# Patient Record
Sex: FEMALE
[De-identification: names, ages, dates, MRNs, and addresses within clinical notes are randomized per-mention and may not be internally consistent; named-entity substitution may affect disease eponyms.]

---

## 2023-02-22 PROBLEM — Z00.00 ENCOUNTER FOR PREVENTIVE HEALTH EXAMINATION: Status: ACTIVE | Noted: 2023-02-22

## 2023-03-10 ENCOUNTER — APPOINTMENT (OUTPATIENT)
Dept: CT IMAGING | Facility: CLINIC | Age: 73
End: 2023-03-10
Payer: MEDICARE

## 2023-03-10 ENCOUNTER — OUTPATIENT (OUTPATIENT)
Dept: OUTPATIENT SERVICES | Facility: HOSPITAL | Age: 73
LOS: 1 days | End: 2023-03-10
Payer: MEDICARE

## 2023-03-10 DIAGNOSIS — Z00.8 ENCOUNTER FOR OTHER GENERAL EXAMINATION: ICD-10-CM

## 2023-03-10 PROCEDURE — 72192 CT PELVIS W/O DYE: CPT | Mod: MH

## 2023-03-10 PROCEDURE — 72192 CT PELVIS W/O DYE: CPT | Mod: 26,MH

## 2025-02-23 ENCOUNTER — INPATIENT (INPATIENT)
Facility: HOSPITAL | Age: 75
LOS: 9 days | Discharge: NURSING, REHAB OR LONG-TERM CARE | DRG: 93 | End: 2025-03-05
Attending: HOSPITALIST | Admitting: STUDENT IN AN ORGANIZED HEALTH CARE EDUCATION/TRAINING PROGRAM
Payer: MEDICARE

## 2025-02-23 VITALS
SYSTOLIC BLOOD PRESSURE: 146 MMHG | HEIGHT: 66 IN | DIASTOLIC BLOOD PRESSURE: 63 MMHG | OXYGEN SATURATION: 93 % | RESPIRATION RATE: 18 BRPM | HEART RATE: 86 BPM | WEIGHT: 149.91 LBS

## 2025-02-23 LAB
ALBUMIN SERPL ELPH-MCNC: 3.6 G/DL — SIGNIFICANT CHANGE UP (ref 3.3–5)
ALP SERPL-CCNC: 92 U/L — SIGNIFICANT CHANGE UP (ref 40–120)
ALT FLD-CCNC: 13 U/L — SIGNIFICANT CHANGE UP (ref 10–45)
ANION GAP SERPL CALC-SCNC: 17 MMOL/L — SIGNIFICANT CHANGE UP (ref 5–17)
APPEARANCE UR: ABNORMAL
APTT BLD: 30.4 SEC — SIGNIFICANT CHANGE UP (ref 24.5–35.6)
AST SERPL-CCNC: 27 U/L — SIGNIFICANT CHANGE UP (ref 10–40)
BACTERIA # UR AUTO: ABNORMAL /HPF
BASOPHILS # BLD AUTO: 0.04 K/UL — SIGNIFICANT CHANGE UP (ref 0–0.2)
BASOPHILS NFR BLD AUTO: 0.4 % — SIGNIFICANT CHANGE UP (ref 0–2)
BILIRUB SERPL-MCNC: 0.6 MG/DL — SIGNIFICANT CHANGE UP (ref 0.2–1.2)
BILIRUB UR-MCNC: NEGATIVE — SIGNIFICANT CHANGE UP
BUN SERPL-MCNC: 41 MG/DL — HIGH (ref 7–23)
CALCIUM SERPL-MCNC: 9.1 MG/DL — SIGNIFICANT CHANGE UP (ref 8.4–10.5)
CAST: >63 /LPF — HIGH (ref 0–4)
CHLORIDE SERPL-SCNC: 108 MMOL/L — SIGNIFICANT CHANGE UP (ref 96–108)
CO2 SERPL-SCNC: 19 MMOL/L — LOW (ref 22–31)
COARSE GRAN CASTS #/AREA URNS AUTO: PRESENT
COLOR SPEC: SIGNIFICANT CHANGE UP
CREAT SERPL-MCNC: 2.03 MG/DL — HIGH (ref 0.5–1.3)
DIFF PNL FLD: ABNORMAL
EGFR: 25 ML/MIN/1.73M2 — LOW
EGFR: 25 ML/MIN/1.73M2 — LOW
EOSINOPHIL # BLD AUTO: 0.25 K/UL — SIGNIFICANT CHANGE UP (ref 0–0.5)
EOSINOPHIL NFR BLD AUTO: 2.5 % — SIGNIFICANT CHANGE UP (ref 0–6)
FLUAV AG NPH QL: DETECTED
FLUBV AG NPH QL: SIGNIFICANT CHANGE UP
GAS PNL BLDV: SIGNIFICANT CHANGE UP
GLUCOSE SERPL-MCNC: 69 MG/DL — LOW (ref 70–99)
GLUCOSE UR QL: NEGATIVE MG/DL — SIGNIFICANT CHANGE UP
HCT VFR BLD CALC: 36.2 % — SIGNIFICANT CHANGE UP (ref 34.5–45)
HGB BLD-MCNC: 11.6 G/DL — SIGNIFICANT CHANGE UP (ref 11.5–15.5)
HYALINE CASTS # UR AUTO: PRESENT
IMM GRANULOCYTES NFR BLD AUTO: 0.5 % — SIGNIFICANT CHANGE UP (ref 0–0.9)
INR BLD: 1.16 RATIO — SIGNIFICANT CHANGE UP (ref 0.85–1.16)
KETONES UR-MCNC: ABNORMAL MG/DL
LEUKOCYTE ESTERASE UR-ACNC: ABNORMAL
LYMPHOCYTES # BLD AUTO: 1.02 K/UL — SIGNIFICANT CHANGE UP (ref 1–3.3)
LYMPHOCYTES # BLD AUTO: 10.3 % — LOW (ref 13–44)
MCHC RBC-ENTMCNC: 30.2 PG — SIGNIFICANT CHANGE UP (ref 27–34)
MCHC RBC-ENTMCNC: 32 G/DL — SIGNIFICANT CHANGE UP (ref 32–36)
MCV RBC AUTO: 94.3 FL — SIGNIFICANT CHANGE UP (ref 80–100)
MONOCYTES # BLD AUTO: 1 K/UL — HIGH (ref 0–0.9)
MONOCYTES NFR BLD AUTO: 10.1 % — SIGNIFICANT CHANGE UP (ref 2–14)
NEUTROPHILS # BLD AUTO: 7.53 K/UL — HIGH (ref 1.8–7.4)
NEUTROPHILS NFR BLD AUTO: 76.2 % — SIGNIFICANT CHANGE UP (ref 43–77)
NITRITE UR-MCNC: NEGATIVE — SIGNIFICANT CHANGE UP
NRBC BLD AUTO-RTO: 0 /100 WBCS — SIGNIFICANT CHANGE UP (ref 0–0)
PH UR: 6.5 — SIGNIFICANT CHANGE UP (ref 5–8)
PLATELET # BLD AUTO: 299 K/UL — SIGNIFICANT CHANGE UP (ref 150–400)
POTASSIUM SERPL-MCNC: 4.2 MMOL/L — SIGNIFICANT CHANGE UP (ref 3.5–5.3)
POTASSIUM SERPL-SCNC: 4.2 MMOL/L — SIGNIFICANT CHANGE UP (ref 3.5–5.3)
PROT SERPL-MCNC: 8.3 G/DL — SIGNIFICANT CHANGE UP (ref 6–8.3)
PROT UR-MCNC: 300 MG/DL
PROTHROM AB SERPL-ACNC: 13.3 SEC — SIGNIFICANT CHANGE UP (ref 9.9–13.4)
RBC # BLD: 3.84 M/UL — SIGNIFICANT CHANGE UP (ref 3.8–5.2)
RBC # FLD: 13.3 % — SIGNIFICANT CHANGE UP (ref 10.3–14.5)
RBC CASTS # UR COMP ASSIST: 32 /HPF — HIGH (ref 0–4)
REVIEW: SIGNIFICANT CHANGE UP
RSV RNA NPH QL NAA+NON-PROBE: SIGNIFICANT CHANGE UP
SARS-COV-2 RNA SPEC QL NAA+PROBE: SIGNIFICANT CHANGE UP
SODIUM SERPL-SCNC: 144 MMOL/L — SIGNIFICANT CHANGE UP (ref 135–145)
SP GR SPEC: 1.02 — SIGNIFICANT CHANGE UP (ref 1–1.03)
SQUAMOUS # UR AUTO: 26 /HPF — HIGH (ref 0–5)
TROPONIN T, HIGH SENSITIVITY RESULT: 47 NG/L — SIGNIFICANT CHANGE UP (ref 0–51)
UROBILINOGEN FLD QL: 0.2 MG/DL — SIGNIFICANT CHANGE UP (ref 0.2–1)
WBC # BLD: 9.89 K/UL — SIGNIFICANT CHANGE UP (ref 3.8–10.5)
WBC # FLD AUTO: 9.89 K/UL — SIGNIFICANT CHANGE UP (ref 3.8–10.5)
WBC CLUMPS # UR AUTO: PRESENT
WBC UR QL: >998 /HPF — HIGH (ref 0–5)

## 2025-02-23 PROCEDURE — 99285 EMERGENCY DEPT VISIT HI MDM: CPT | Mod: GC

## 2025-02-23 PROCEDURE — 70450 CT HEAD/BRAIN W/O DYE: CPT | Mod: 26

## 2025-02-23 PROCEDURE — 71250 CT THORAX DX C-: CPT | Mod: 26

## 2025-02-23 PROCEDURE — 74176 CT ABD & PELVIS W/O CONTRAST: CPT | Mod: 26

## 2025-02-23 PROCEDURE — 71045 X-RAY EXAM CHEST 1 VIEW: CPT | Mod: 26

## 2025-02-23 RX ORDER — PIPERACILLIN-TAZO-DEXTROSE,ISO 3.375G/5
3.38 IV SOLUTION, PIGGYBACK PREMIX FROZEN(ML) INTRAVENOUS ONCE
Refills: 0 | Status: COMPLETED | OUTPATIENT
Start: 2025-02-23 | End: 2025-02-23

## 2025-02-23 RX ADMIN — Medication 500 MILLILITER(S): at 17:03

## 2025-02-23 RX ADMIN — Medication 200 GRAM(S): at 18:05

## 2025-02-23 NOTE — ED PROVIDER NOTE - PHYSICAL EXAMINATION
GENERAL: well appearing  HEAD: normocephalic, atraumatic  HEENT: normal conjunctiva, oral mucosa moist  CARDIAC: regular rate and rhythm  PULM: coarse lung sounds b/l  GI: abdomen nondistended, soft, nontender  : no suprapubic tenderness  NEURO: moving all 4 extremities, AOx1  MSK: no peripheral edema  SKIN: extremities warm

## 2025-02-23 NOTE — ED ADULT NURSE NOTE - OBJECTIVE STATEMENT
75 y/o female with PMH of dementia arrives to the ER by ambulance from [facility]  complaining of coffee ground emesis.  As per EMS report pt had an episode of coffee ground today, sent for further evaluation.  On assessment pt is lethargic, A&Ox0,airway is patent, breathing spontaneously and unlabored. Skin is dry, warm. Abdomen is soft, no distended, no tender. rash all over the trunk noted Full ROM in all extremities.  Patient undressed and placed into gown, Pt placed on continuous cardiac monitor SPO2 91%RA placed on 2 L NC improving to 98 , rectal temp 98.5. Comfort and safety provided, side rails up with bed locked and in lowest position for safety. call bell within reach. [place] provided. will continue to reassess. 75 y/o female with PMH of dementia arrives to the ER by ambulance from [facility]  complaining of coffee ground emesis.  As per EMS report pt had an episode of coffee ground today and was agitated,  sent for further evaluation.  On assessment pt is lethargic, A&Ox0,airway is patent, breathing spontaneously and unlabored. Skin is dry, warm. Abdomen is soft, no distended, no tender. rash all over the trunk noted Full ROM in all extremities.  Patient undressed and placed into gown, Pt placed on continuous cardiac monitor SPO2 91%RA placed on 2 L NC improving to 98 , rectal temp 98.5. Comfort and safety provided, side rails up with bed locked and in lowest position for safety. call bell within reach. [place] provided. will continue to reassess.

## 2025-02-23 NOTE — ED PROVIDER NOTE - ATTENDING CONTRIBUTION TO CARE
Hx: limited from patient due to dementia with cognitive impairement.  From EMS, NH records and pt's daughter Miguelina Wheeler.  Pt with +influenza last week, also was getting IV antibiotics, now with reported coffee ground emesis once, here for eval.  Had blood work today showing Cr=2.11 and hgb 11 (unknown baseline).  Pt with colectomy from UC 40+yrs ago.     PE: ill appearing, mild respiratory distress, awake but does not answer any of my questions, responds to pain, mild tachypnea, coarse bs b/l, ab soft, nt, fungal rash abdominal and pelvic wall, ostomy in place without obvious cellulitis, no JVD.      MDM: reported coffee-ground emesis, consider GI bleed, cough and muld hypoxia, consider viral vs bacterial pneumonia consider pulm edema, check cbc r/o anemia or leukocytosis, check bmp to r/o metabolic derangement and lyte imbalance, trop, probnp, cultures blood, viral swab, ct chest/ab/pelvis eval for pneumonia, intra-abdominal process given poor historian Hx: limited from patient due to dementia with cognitive impairement.  From EMS, NH records and pt's daughter Miguelina Wheeler.  Pt with +influenza last week, also was getting IV antibiotics, now with reported coffee ground emesis once, here for eval.  Had blood work today showing Cr=2.11 and hgb 11 (unknown baseline).  Pt with colectomy from UC 40+yrs ago.     PE: ill appearing, mild respiratory distress, awake but does not answer any of my questions, responds to pain, mild tachypnea, coarse bs b/l, ab soft, nt, fungal rash abdominal and pelvic wall, ostomy in place without obvious cellulitis, no JVD.      MDM: reported coffee-ground emesis, consider GI bleed, cough and muld hypoxia, consider viral vs bacterial pneumonia consider pulm edema, check cbc r/o anemia or leukocytosis, check bmp to r/o metabolic derangement and lyte imbalance, trop, probnp, cultures blood, viral swab, ct chest/ab/pelvis eval for pneumonia, intra-abdominal process given poor historian    Progress Note 1850: pt with significant UTI likely causing encephalopathy, awaiting ct head/chest/abdomen.  Updated pt's daughter.      ***Of note, pt's daughter is HCP, showed me electronic document, and states her brother (pt's son) has a difference of opinion on mother's care and may request different things (like moving facilities, etc...).

## 2025-02-23 NOTE — ED PROVIDER NOTE - INTERNATIONAL TRAVEL
No Quality 111:Pneumonia Vaccination Status For Older Adults: Pneumococcal Vaccination Previously Received Detail Level: Detailed Quality 110: Preventive Care And Screening: Influenza Immunization: Influenza Immunization Administered during Influenza season Name And Contact Information For Health Care Proxy: [id number] [place] Quality 402: Tobacco Use And Help With Quitting Among Adolescents: Patient screened for tobacco and never smoked

## 2025-02-23 NOTE — ED PROVIDER NOTE - PROGRESS NOTE DETAILS
[place] PGY3 - Lab work notable for influenza A positive, lactate 1.7 UA with evidence of acute urinary tract infection.  Pending CT scans.  Likely admission for antibiotics and further management.  Zosyn ordered for acute UTI.

## 2025-02-23 NOTE — ED PROVIDER NOTE - OBJECTIVE STATEMENT
74-year-old female with a history of dementia, T2DM, CHF, HLD, A-fib, CAD, HTN, GERD, CKD presenting with altered mental status, hypoxia from nursing facility.  Per daughter at bedside he was diagnosed with flu approximately 6 days ago.  Earlier today she was at her nursing home and was noted to be hypoxic to 93% and sent to the ED for further evaluation.  Daughter also notes that she was significantly more altered than her baseline.  Unable to complete ROS 2/2 mental status.

## 2025-02-23 NOTE — ED PROVIDER NOTE - CARE PLAN
1 Principal Discharge DX:	Acute UTI   Principal Discharge DX:	Toxic encephalopathy  Secondary Diagnosis:	Acute cystitis  Secondary Diagnosis:	Acute respiratory failure with hypoxia

## 2025-02-24 DIAGNOSIS — K92.0 HEMATEMESIS: ICD-10-CM

## 2025-02-24 DIAGNOSIS — G92.9 UNSPECIFIED TOXIC ENCEPHALOPATHY: ICD-10-CM

## 2025-02-24 DIAGNOSIS — I48.0 PAROXYSMAL ATRIAL FIBRILLATION: ICD-10-CM

## 2025-02-24 DIAGNOSIS — J10.1 INFLUENZA DUE TO OTHER IDENTIFIED INFLUENZA VIRUS WITH OTHER RESPIRATORY MANIFESTATIONS: ICD-10-CM

## 2025-02-24 DIAGNOSIS — J18.9 PNEUMONIA, UNSPECIFIED ORGANISM: ICD-10-CM

## 2025-02-24 DIAGNOSIS — E11.9 TYPE 2 DIABETES MELLITUS WITHOUT COMPLICATIONS: ICD-10-CM

## 2025-02-24 DIAGNOSIS — E16.2 HYPOGLYCEMIA, UNSPECIFIED: ICD-10-CM

## 2025-02-24 DIAGNOSIS — I10 ESSENTIAL (PRIMARY) HYPERTENSION: ICD-10-CM

## 2025-02-24 DIAGNOSIS — I25.10 ATHEROSCLEROTIC HEART DISEASE OF NATIVE CORONARY ARTERY WITHOUT ANGINA PECTORIS: ICD-10-CM

## 2025-02-24 DIAGNOSIS — N39.0 URINARY TRACT INFECTION, SITE NOT SPECIFIED: ICD-10-CM

## 2025-02-24 LAB
CULTURE RESULTS: NO GROWTH — SIGNIFICANT CHANGE UP
FLUAV H1 2009 PAND RNA SPEC QL NAA+PROBE: DETECTED
GLUCOSE BLDC GLUCOMTR-MCNC: 157 MG/DL — HIGH (ref 70–99)
GLUCOSE BLDC GLUCOMTR-MCNC: 162 MG/DL — HIGH (ref 70–99)
GLUCOSE BLDC GLUCOMTR-MCNC: 172 MG/DL — HIGH (ref 70–99)
GLUCOSE BLDC GLUCOMTR-MCNC: 177 MG/DL — HIGH (ref 70–99)
GLUCOSE BLDC GLUCOMTR-MCNC: 177 MG/DL — HIGH (ref 70–99)
HCT VFR BLD CALC: 36.2 % — SIGNIFICANT CHANGE UP (ref 34.5–45)
HGB BLD-MCNC: 11.7 G/DL — SIGNIFICANT CHANGE UP (ref 11.5–15.5)
HPIV4 RNA SPEC QL NAA+PROBE: DETECTED
MCHC RBC-ENTMCNC: 30.6 PG — SIGNIFICANT CHANGE UP (ref 27–34)
MCHC RBC-ENTMCNC: 32.3 G/DL — SIGNIFICANT CHANGE UP (ref 32–36)
MCV RBC AUTO: 94.8 FL — SIGNIFICANT CHANGE UP (ref 80–100)
NRBC BLD AUTO-RTO: 0 /100 WBCS — SIGNIFICANT CHANGE UP (ref 0–0)
PLATELET # BLD AUTO: 316 K/UL — SIGNIFICANT CHANGE UP (ref 150–400)
RAPID RVP RESULT: DETECTED
RBC # BLD: 3.82 M/UL — SIGNIFICANT CHANGE UP (ref 3.8–5.2)
RBC # FLD: 13.3 % — SIGNIFICANT CHANGE UP (ref 10.3–14.5)
SARS-COV-2 RNA SPEC QL NAA+PROBE: SIGNIFICANT CHANGE UP
SPECIMEN SOURCE: SIGNIFICANT CHANGE UP
WBC # BLD: 11.66 K/UL — HIGH (ref 3.8–10.5)
WBC # FLD AUTO: 11.66 K/UL — HIGH (ref 3.8–10.5)

## 2025-02-24 PROCEDURE — 99223 1ST HOSP IP/OBS HIGH 75: CPT

## 2025-02-24 RX ORDER — INSULIN LISPRO 100 U/ML
INJECTION, SOLUTION INTRAVENOUS; SUBCUTANEOUS AT BEDTIME
Refills: 0 | Status: DISCONTINUED | OUTPATIENT
Start: 2025-02-24 | End: 2025-02-25

## 2025-02-24 RX ORDER — SODIUM CHLORIDE 9 G/1000ML
1000 INJECTION, SOLUTION INTRAVENOUS
Refills: 0 | Status: DISCONTINUED | OUTPATIENT
Start: 2025-02-24 | End: 2025-02-25

## 2025-02-24 RX ORDER — GLUCAGON 3 MG/1
1 POWDER NASAL ONCE
Refills: 0 | Status: DISCONTINUED | OUTPATIENT
Start: 2025-02-24 | End: 2025-03-05

## 2025-02-24 RX ORDER — METOPROLOL SUCCINATE 50 MG/1
10 TABLET, EXTENDED RELEASE ORAL EVERY 6 HOURS
Refills: 0 | Status: DISCONTINUED | OUTPATIENT
Start: 2025-02-24 | End: 2025-02-24

## 2025-02-24 RX ORDER — SENNA 187 MG
1 TABLET ORAL
Refills: 0 | DISCHARGE

## 2025-02-24 RX ORDER — METOPROLOL SUCCINATE 50 MG/1
10 TABLET, EXTENDED RELEASE ORAL EVERY 6 HOURS
Refills: 0 | Status: DISCONTINUED | OUTPATIENT
Start: 2025-02-24 | End: 2025-02-26

## 2025-02-24 RX ORDER — OSELTAMIVIR PHOSPHATE 75 MG/1
30 CAPSULE ORAL DAILY
Refills: 0 | Status: DISCONTINUED | OUTPATIENT
Start: 2025-02-24 | End: 2025-02-25

## 2025-02-24 RX ORDER — ATORVASTATIN CALCIUM 80 MG/1
10 TABLET, FILM COATED ORAL AT BEDTIME
Refills: 0 | Status: DISCONTINUED | OUTPATIENT
Start: 2025-02-24 | End: 2025-02-27

## 2025-02-24 RX ORDER — INSULIN LISPRO 100 U/ML
INJECTION, SOLUTION INTRAVENOUS; SUBCUTANEOUS
Refills: 0 | Status: DISCONTINUED | OUTPATIENT
Start: 2025-02-24 | End: 2025-02-25

## 2025-02-24 RX ORDER — CLOPIDOGREL BISULFATE 75 MG/1
1 TABLET, FILM COATED ORAL
Refills: 0 | DISCHARGE

## 2025-02-24 RX ORDER — LOSARTAN POTASSIUM 100 MG/1
25 TABLET, FILM COATED ORAL DAILY
Refills: 0 | Status: DISCONTINUED | OUTPATIENT
Start: 2025-02-24 | End: 2025-02-28

## 2025-02-24 RX ORDER — ATORVASTATIN CALCIUM 80 MG/1
1 TABLET, FILM COATED ORAL
Refills: 0 | DISCHARGE

## 2025-02-24 RX ORDER — LIPASE/PROTEASE/AMYLASE 10K-37.5K
1 CAPSULE,DELAYED RELEASE (ENTERIC COATED) ORAL
Refills: 0 | Status: DISCONTINUED | OUTPATIENT
Start: 2025-02-24 | End: 2025-03-05

## 2025-02-24 RX ORDER — AZITHROMYCIN 250 MG
500 CAPSULE ORAL EVERY 24 HOURS
Refills: 0 | Status: DISCONTINUED | OUTPATIENT
Start: 2025-02-24 | End: 2025-02-24

## 2025-02-24 RX ORDER — HALOPERIDOL 10 MG/1
1 TABLET ORAL ONCE
Refills: 0 | Status: COMPLETED | OUTPATIENT
Start: 2025-02-24 | End: 2025-02-24

## 2025-02-24 RX ORDER — LABETALOL HYDROCHLORIDE 200 MG/1
5 TABLET, FILM COATED ORAL ONCE
Refills: 0 | Status: COMPLETED | OUTPATIENT
Start: 2025-02-24 | End: 2025-02-24

## 2025-02-24 RX ORDER — DEXTROSE 50 % IN WATER 50 %
25 SYRINGE (ML) INTRAVENOUS ONCE
Refills: 0 | Status: DISCONTINUED | OUTPATIENT
Start: 2025-02-24 | End: 2025-02-24

## 2025-02-24 RX ORDER — PIPERACILLIN-TAZO-DEXTROSE,ISO 3.375G/5
3.38 IV SOLUTION, PIGGYBACK PREMIX FROZEN(ML) INTRAVENOUS EVERY 12 HOURS
Refills: 0 | Status: DISCONTINUED | OUTPATIENT
Start: 2025-02-24 | End: 2025-02-24

## 2025-02-24 RX ORDER — DEXTROSE 50 % IN WATER 50 %
25 SYRINGE (ML) INTRAVENOUS ONCE
Refills: 0 | Status: DISCONTINUED | OUTPATIENT
Start: 2025-02-24 | End: 2025-03-05

## 2025-02-24 RX ORDER — DEXTROSE 50 % IN WATER 50 %
15 SYRINGE (ML) INTRAVENOUS ONCE
Refills: 0 | Status: DISCONTINUED | OUTPATIENT
Start: 2025-02-24 | End: 2025-03-05

## 2025-02-24 RX ORDER — PIPERACILLIN-TAZO-DEXTROSE,ISO 3.375G/5
3.38 IV SOLUTION, PIGGYBACK PREMIX FROZEN(ML) INTRAVENOUS EVERY 8 HOURS
Refills: 0 | Status: DISCONTINUED | OUTPATIENT
Start: 2025-02-24 | End: 2025-02-25

## 2025-02-24 RX ORDER — SODIUM CHLORIDE 9 G/1000ML
1000 INJECTION, SOLUTION INTRAVENOUS
Refills: 0 | Status: DISCONTINUED | OUTPATIENT
Start: 2025-02-24 | End: 2025-03-05

## 2025-02-24 RX ORDER — GABAPENTIN 400 MG/1
100 CAPSULE ORAL THREE TIMES A DAY
Refills: 0 | Status: DISCONTINUED | OUTPATIENT
Start: 2025-02-24 | End: 2025-03-05

## 2025-02-24 RX ORDER — LIPASE/PROTEASE/AMYLASE 10K-37.5K
1 CAPSULE,DELAYED RELEASE (ENTERIC COATED) ORAL
Refills: 0 | DISCHARGE

## 2025-02-24 RX ORDER — METOPROLOL SUCCINATE 50 MG/1
1 TABLET, EXTENDED RELEASE ORAL
Refills: 0 | DISCHARGE

## 2025-02-24 RX ORDER — DEXTROSE 50 % IN WATER 50 %
12.5 SYRINGE (ML) INTRAVENOUS ONCE
Refills: 0 | Status: DISCONTINUED | OUTPATIENT
Start: 2025-02-24 | End: 2025-03-05

## 2025-02-24 RX ORDER — METOPROLOL SUCCINATE 50 MG/1
100 TABLET, EXTENDED RELEASE ORAL DAILY
Refills: 0 | Status: DISCONTINUED | OUTPATIENT
Start: 2025-02-24 | End: 2025-02-24

## 2025-02-24 RX ORDER — GABAPENTIN 400 MG/1
1 CAPSULE ORAL
Refills: 0 | DISCHARGE

## 2025-02-24 RX ADMIN — Medication 25 GRAM(S): at 13:33

## 2025-02-24 RX ADMIN — INSULIN LISPRO 1: 100 INJECTION, SOLUTION INTRAVENOUS; SUBCUTANEOUS at 12:24

## 2025-02-24 RX ADMIN — Medication 25 GRAM(S): at 21:55

## 2025-02-24 RX ADMIN — INSULIN LISPRO 1: 100 INJECTION, SOLUTION INTRAVENOUS; SUBCUTANEOUS at 08:49

## 2025-02-24 RX ADMIN — Medication 25 GRAM(S): at 06:15

## 2025-02-24 RX ADMIN — LABETALOL HYDROCHLORIDE 5 MILLIGRAM(S): 200 TABLET, FILM COATED ORAL at 21:09

## 2025-02-24 RX ADMIN — SODIUM CHLORIDE 50 MILLILITER(S): 9 INJECTION, SOLUTION INTRAVENOUS at 18:17

## 2025-02-24 RX ADMIN — Medication 40 MILLIGRAM(S): at 04:21

## 2025-02-24 RX ADMIN — Medication 40 MILLIGRAM(S): at 18:16

## 2025-02-24 RX ADMIN — INSULIN LISPRO 1: 100 INJECTION, SOLUTION INTRAVENOUS; SUBCUTANEOUS at 18:53

## 2025-02-24 RX ADMIN — Medication 250 MILLIGRAM(S): at 10:30

## 2025-02-24 RX ADMIN — HALOPERIDOL 1 MILLIGRAM(S): 10 TABLET ORAL at 01:06

## 2025-02-24 NOTE — SWALLOW BEDSIDE ASSESSMENT ADULT - SWALLOW EVAL: DIAGNOSIS
74yoF w/ hx of dementia, GERD, p/w acute metabolic encephalopathy iso influenza, CAP, poss UTI, and poss coffee ground emesis; CT chest w/ LLL opacity and scattered groundglass and tree-in-bud opacities. Pt p/w orally defensive behaviors negatively impacting participation in the feeding task. Pt awake and awake, nonverbal, unable to follow commands; however, appearing internally distracted and restless, unable to participate meaningfully in SLP evaluation. Upon attempts to present PO to oral cavity, pt noted to repeatedly turn head away from clinician despite encouragement. Baseline cough appreciated throughout encounter. No PO accepted on this date. Per d/w daughter, pt is NOT at baseline in regards to cognitive status; is hopeful that pt will improve as infection resolves. Rx NPO, w/ non-oral means of nutrition/hydration/medications, w/ plan for this service to follow for re-evaluation at the bedside pending improvement in participation.

## 2025-02-24 NOTE — H&P ADULT - NSHPLABSRESULTS_GEN_ALL_CORE
Personally reviewed old records.  Personally reviewed labs.  Personally reviewed imaging.  reviewed ekg. appears NSR with pac. rate 88 qtc 464                          11.6   9.89  )-----------( 299      ( 2025 16:53 )             36.2           144  |  108  |  41[H]  ----------------------------<  69[L]  4.2   |  19[L]  |  2.03[H]    Ca    9.1      2025 16:53    TPro  8.3  /  Alb  3.6  /  TBili  0.6  /  DBili  x   /  AST  27  /  ALT  13  /  AlkPhos  92              LIVER FUNCTIONS - ( 2025 16:53 )  Alb: 3.6 g/dL / Pro: 8.3 g/dL / ALK PHOS: 92 U/L / ALT: 13 U/L / AST: 27 U/L / GGT: x             PT/INR - ( 2025 16:53 )   PT: 13.3 sec;   INR: 1.16 ratio         PTT - ( 2025 16:53 )  PTT:30.4 sec    Urinalysis Basic - ( 2025 16:53 )    Color: Dark Yellow / Appearance: Turbid / S.018 / pH: x  Gluc: 69 mg/dL / Ketone: Trace mg/dL  / Bili: Negative / Urobili: 0.2 mg/dL   Blood: x / Protein: 300 mg/dL / Nitrite: Negative   Leuk Esterase: Large / RBC: 32 /HPF / WBC >998 /HPF   Sq Epi: x / Non Sq Epi: 26 /HPF / Bacteria: Many /HPF

## 2025-02-24 NOTE — H&P ADULT - ASSESSMENT
74F c hx dementia, HTN, DM2, UC s/p colectomy and ileostomy, Afib, CAD, ?CHF, GERD, pancreatic insufficiency, migraines, anxiety, venous insufficiency, CKD (baseline Cr ~1.8), recent influenza diagnosed 6 days ago, pw acute metabolic encephalopathy in setting of influenza, CAP, poss UTI, and poss coffee ground emesis.

## 2025-02-24 NOTE — SWALLOW BEDSIDE ASSESSMENT ADULT - NS SPL SWALLOW CLINIC TRIAL FT
Attempted to present ice chip to labial surface for stimulation and moderately thick liquid via tsp, pt p/w orally defensive behaviors (turning head away repeatedly) during multiple attempts at administering PO despite maximal encouragement and rationale provided.

## 2025-02-24 NOTE — SWALLOW BEDSIDE ASSESSMENT ADULT - SLP GENERAL OBSERVATIONS
Pt encountered in bed, awake, alert, staring blankly straight ahead but able to achieve eye contact w/ clinician fleetingly, nonverbal, on RA, +b/l mittens. Appearing restless, smacking arms down on bed with mittens in place repeatedly. +baseline congested cough.

## 2025-02-24 NOTE — H&P ADULT - PROBLEM SELECTOR PLAN 5
- pt's daughter gerald manzanares reportedly HCP  - per ED note: "pt's daughter is HCP, showed me electronic document, and states her brother (pt's son) has a difference of opinion on mother's care and may request different things (like moving facilities, etc...)."  - per [facility] notes, pt is full code  - aspiration, fall precautions - pt's daughter gerald manzanares reportedly HCP  - per ED note: "pt's daughter is HCP, showed me electronic document, and states her brother (pt's son) has a difference of opinion on mother's care and may request different things (like moving facilities, etc...)."  - per [facility] notes, pt is full code  - aspiration, fall precautions    - may need PT/OT eval once baseline function status is known

## 2025-02-24 NOTE — H&P ADULT - PROBLEM SELECTOR PLAN 3
Cataract symptoms i.e., glare, blur discussed. Pt to call if worsening vision or trouble with driving, TV, reading, ADL. UV precautions. Reviewed possibility of future cataract surgery. - zosyn as above - zosyn as above  - f/u cultures

## 2025-02-24 NOTE — SWALLOW BEDSIDE ASSESSMENT ADULT - NS ASR SWALLOW FINDINGS DISCUS
ARIANNA Rizzo; ACP Alexander Rodriguez/Patient ARIANNA Rizzo; MATTHEW Rodriguez; Pt's daughter Miguelina/Patient

## 2025-02-24 NOTE — SWALLOW BEDSIDE ASSESSMENT ADULT - SWALLOW EVAL: RECOMMENDED DIET
NPO, with non-oral nutrition/hydration/medications in interim pending SLP re-evaluation tentatively scheduled for 02/25

## 2025-02-24 NOTE — SWALLOW BEDSIDE ASSESSMENT ADULT - SWALLOW EVAL: THERAPY FREQUENCY
No. ELENA screening performed.  STOP BANG Legend: 0-2 = LOW Risk; 3-4 = INTERMEDIATE Risk; 5-8 = HIGH Risk Will followup at the bedside for re-evaluation of swallow function pending improvement in mentation and participation.

## 2025-02-24 NOTE — SWALLOW BEDSIDE ASSESSMENT ADULT - SWALLOW EVAL: ORAL MUSCULATURE
unable to fully assess d/t reduced participation/suspected decreased comprehension of simple directives.

## 2025-02-24 NOTE — ADVANCED PRACTICE NURSE CONSULT - ASSESSMENT
Consult received & events noted to date. The pt was encountered on 5M-MrCarlie is alert &oriented, however son endorses some confusion. Pt was in bed, with son at bedside & PCA.   Introduced self & role of CWOCN to pt & son. Pt requires assistance w/turning & repositioning & staff RN, Galina at bedside to assist. Pt is on an alternating air with low air loss support surface. Pt is pleasant & cooperative. Urinal and incontinent pad present at this time due to urinary incontinence. Pt without BM at this time, however prior to admission, son states pt was ambulatory but "forgetful".  Consult received & events noted to date. The pt was encountered in ER- Ms. Snyder is alert, however nonverbal, restless, thrashing in bed, confused, pulling, kicking, and at times attempting to bite. Bilateral mittens in place as pt recently removing IV's and pouching system. Staff RN at bedside during exam while CWOCNs x 2 at bedside to evaluate ostomy and perineal/sacral/bilateral buttocks. Family not present, however, staff RN endorses per pt's son, pt was talkative and "playing bingo" at facility.     Introduced self & role of CWOCN to pt. Pt requires total assistance w/turning & repositioning & staff RN, at bedside to assist. Ileostomy pink & viable; pouch seal with leakage noted (see colleague's noted) incontinent pad with external female catheter present at this time due to urinary incontinence.   Pt's recent decrease in mobility, inactivity, incontinence of urine & stool (ileostomy) and per nursing staff, pt pulling off and scratching peristomal skin/abdomen as well as poor nutritional status all contribute to her high risk for pressure injury development and hinder healing.    The following was noted:  1. Sacrococcygeal/bilateral buttocks is a 14 cm L x 24cm W x 0cm D area of intact, hyperpigmented skin with a central area of dusky maroon intact discoloration at coccyx suggesting that there may be a component of deep tissue injury.  No fluctuance, erythema nor drainage noted. Also noted was an area of persistent nonblanchable hyperpigmentation noted with hypopigmentation present over B/L buttocks. The presentation of this is consistent with a healed pressure injury as evidenced by hypopigmentation. The likelihood of a recurring skin injury in the same location of a prior skin injury is greater due to the reduced tensile strength of tissue.      2. Perineum/Bilateral posterior thighs (ischial fold)- superficial partial thickness skin loss, area measures approximately 6cm x 2cm x 0.1cm with pink wound base and scattered areas in groin. No fluctuance, drainage; malodor. Appears to be finding of incontinence associated dermatitis.   Findings suggest that these skin injuries appear to be related to incontinence associated dermatitis and also cannot R/O component of deep tissue injury present on admission.   Will recommend to continue to monitor & apply Alex moisture barrier ointment twice daily & prn soiling episodes to lay down a protective coating to skin as pt is also incontinent of urine & stool at times.  As pt was admitted with a pressure injury, a dietician consult is recommended for further evaluation (speech and swallow evaluation pending).   Once consult was complete, attempted to provide education regarding the need for routine turning and positioning to prevent pressure injuries, use of waffle seat cushion when OOB to  and utilizing pillow positioner assistive devices, however pt is confused and family not present. Reviewed & discussed above with staff RN at bedside.

## 2025-02-24 NOTE — H&P ADULT - HISTORY OF PRESENT ILLNESS
74F c hx dementia, HTN, DM2, UC s/p colectomy and ileostomy, Afib, CAD, GERD, pancreatic insufficiency, migraines, anxiety, venous insufficiency, CKD (baseline Cr ~1.8), recent influenza diagnosed 6 days ago, presents from [facility] with cough, confusion, poss coffee ground emesis.    Unable to obtain history from pt who is awake, looking around, briefly regards, but nonverbal, not acknowledging voice, not following commands.  Called pt's daughter/HCP Miguelina Wheeler but no one answering, so voicemail was left. Review of med list shows pt was started on cefepime on 2/18/25.  unknown baseline mental/functional status.  Reportedly pt had AMS and O2 sat of 93% RA at nursing facility.  Per daughter pt diagnosed with flu approximately 6 days ago but unknown if pt was given tamiflu.  Also poss reported coffee ground emesis?   74F c hx dementia, HTN, DM2, UC s/p colectomy and ileostomy, Afib, CAD, ?CHF, GERD, pancreatic insufficiency, migraines, anxiety, venous insufficiency, CKD (baseline Cr ~1.8), recent influenza diagnosed 6 days ago, presents from [facility] with cough, confusion, poss coffee ground emesis.    Unable to obtain history from pt who is awake, looking around, briefly regards, but nonverbal, not acknowledging voice, not following commands.  Called [facility] but no one picking up. Called pt's daughter/HCP Miguelina Wheeler but no one answering, so voicemail was left. Review of med list shows pt was started on cefepime on 2/18/25.  unknown baseline mental/functional status.  Reportedly pt had AMS and O2 sat of 93% RA at nursing facility.  Per daughter pt diagnosed with flu approximately 6 days ago but unknown if pt was given tamiflu.  Also poss reported coffee ground emesis?    While in the ED, pt received haldol 1mg

## 2025-02-24 NOTE — SWALLOW BEDSIDE ASSESSMENT ADULT - SLP PERTINENT HISTORY OF CURRENT PROBLEM
74yoF w/ hx dementia, HTN, DM2, UC s/p colectomy and ileostomy, Afib, CAD, ?CHF, GERD, pancreatic insufficiency, migraines, anxiety, venous insufficiency, CKD (baseline Cr ~1.8), recent influenza diagnosed 6 days ago, pw acute metabolic encephalopathy in setting of influenza, CAP, poss UTI, and poss coffee ground emesis. Will start protonix 40 IV BID, may need GI consult once history is clarified. [facility]. Empiric azithro for CAP, zosyn for poss aspiration.

## 2025-02-24 NOTE — PATIENT PROFILE ADULT - FALL HARM RISK - RISK INTERVENTIONS
Assistance OOB with selected safe patient handling equipment/Assistance with ambulation/Communicate Fall Risk and Risk Factors to all staff, patient, and family/Discuss with provider need for PT consult/Monitor for mental status changes/Monitor gait and stability/Move patient closer to nurses' station/Provide patient with walking aids - walker, cane, crutches/Reinforce activity limits and safety measures with patient and family/Reorient to person, place and time as needed/Toileting schedule using arm’s reach rule for commode and bathroom/Use of alarms - bed, chair and/or voice tab/Visual Cue: Yellow wristband/Bed in lowest position, wheels locked, appropriate side rails in place/Call bell, personal items and telephone in reach/Instruct patient to call for assistance before getting out of bed or chair/Non-slip footwear when patient is out of bed/[place] to call system/Physically safe environment - no spills, clutter or unnecessary equipment/Purposeful Proactive Rounding/Room/bathroom lighting operational, light cord in reach

## 2025-02-24 NOTE — ADVANCED PRACTICE NURSE CONSULT - ASSESSMENT
Seen pt in the ED to assess pouching system as requested by staff RN, due to leakage issues. On assessment, pt awake, confused, disoriented, slightly agitated thrashing in bed.( noted hx of dementia). Safety maintained. Unsecured B/L mittens in place , per RN, pt w/ hx of pulling lines as well as ostomy pouch. Complete pouching system changed due to leakage. Stoma 1 1/8"L x 1 3/8" pink& viable, flush with skin, + creases at 3&9 o'clock,  draining yellow liquid stool. Peristomal skin & mucocutaneous junction intact. Repouched w/ 2 1/4" soft convex skin barrier, bead of paste applied to skin creases to level surface & at the back of skin barrier to caulk & drainable pouch. Stoma belt applied to add extra support at 3 & 9 o'clock position. Extra supplies provided. Discussed plans w/ staff RN at bedside.               Seen pt in the ED to assess pouching system as requested by staff RN, due to leakage issues. On assessment, pt awake, confused, disoriented, slightly agitated thrashing in bed.( noted hx of dementia). Safety maintained. Unsecured B/L mittens in place , per RN, pt w/ hx of pulling lines as well as ostomy pouch. Pouching system in place, appears with undermining of drainage noted. Complete pouching system changed due to leakage. Stoma 1 1/8"L x 1 3/8" pink& viable, flush with skin, + creases at 3&9 o'clock,  draining yellow liquid stool. Peristomal skin slightly denuded, surrounding skin in LLQ abd w/ scattered rashes which appears fungal, recommend Nystatin powder when available.  Mucocutaneous junction intact. Stoma powder applied to denuded skin to absorb moisture & dabbed w/ cavilon 3M to seal & protect skin.  Repouched w/ 2 1/4" soft convex skin barrier, bead of paste applied to skin creases to level surface & at the back of skin barrier to caulk & drainable pouch. Stoma belt applied to add extra support at 3 & 9 o'clock position. Extra supplies provided. Discussed plans w/ staff RN at bedside.

## 2025-02-24 NOTE — SWALLOW BEDSIDE ASSESSMENT ADULT - COMMENTS
Imagin/23: CXR: Low left lung volumes with probable effusion and atelectasis.  : CT Chest: Confluent consolidative opacity in the left lower lobe, cannot exclude left basilar pneumonia. Additional scattered groundglass and tree-in-bud opacities bilaterally. No bowel obstruction. Cholelithiasis.  : CTH: Visualized portions demonstrate no acute intracranial hemorrhage, mass effect, or midline shift.    **Not previously known to this service. Per follow-up chart note--> son at bedside reports patient is at baseline alert, oriented to place and people, interactive. He noticed patient has been declining since  when she developed cough, diagnosed with FLU and PNA. No prior issue with swallowing. follow up S&S eval. no reported vomiting while in ED but per daughter, Miguelina, patient reportedly had one episode of vomiting at NH which prompted them to send the patient the ED.       Imagin/23: CXR: Low left lung volumes with probable effusion and atelectasis.  : CT Chest: Confluent consolidative opacity in the left lower lobe, cannot exclude left basilar pneumonia. Additional scattered groundglass and tree-in-bud opacities bilaterally. No bowel obstruction. Cholelithiasis.  : CTH: Visualized portions demonstrate no acute intracranial hemorrhage, mass effect, or midline shift.    **Not previously known to this service. Per follow-up chart note--> son at bedside reports patient is at baseline alert, oriented to place and people, interactive. He noticed patient has been declining since  when she developed cough, diagnosed with FLU and PNA. No prior issue with swallowing. follow up S&S eval. no reported vomiting while in ED but per daughter, Miguelina, patient reportedly had one episode of vomiting at NH which prompted them to send the patient the ED.     Imagin/23: CXR: Low left lung volumes with probable effusion and atelectasis.  : CT Chest: Confluent consolidative opacity in the left lower lobe, cannot exclude left basilar pneumonia. Additional scattered groundglass and tree-in-bud opacities bilaterally. No bowel obstruction. Cholelithiasis.  : CTH: Visualized portions demonstrate no acute intracranial hemorrhage, mass effect, or midline shift.    **Not previously known to this service. Per follow-up chart note--> son at bedside reports patient is at baseline alert, oriented to place and people, interactive. He noticed patient has been declining since  when she developed cough, diagnosed with FLU and PNA. No prior issue with swallowing. follow up S&S eval. no reported vomiting while in ED but per daughter, Miguelina, patient reportedly had one episode of vomiting at NH which prompted them to send the patient the ED.     Imagin/23: CXR: Low left lung volumes with probable effusion and atelectasis.  : CT Chest: Confluent consolidative opacity in the left lower lobe, cannot exclude left basilar pneumonia. Additional scattered groundglass and tree-in-bud opacities bilaterally. No bowel obstruction. Cholelithiasis.  : CTH: Visualized portions demonstrate no acute intracranial hemorrhage, mass effect, or midline shift.    Failed dysphagia screen  @4:23 on 5mL water, remains NPO.  **Not previously known to this service.

## 2025-02-24 NOTE — H&P ADULT - PROBLEM SELECTOR PLAN 4
- will start protonix 40 IV BID  - need to obtain collateral from daughter or [facility]  - holding home asa, plavix  - hold a/c  - type and screen  - trend hb - will start protonix 40 IV BID  - need to obtain collateral from daughter or [facility]  - holding home asa, plavix  - hold a/c  - type and screen  - trend hb  - may need GI consult once history is clarified

## 2025-02-24 NOTE — SWALLOW BEDSIDE ASSESSMENT ADULT - ADDITIONAL RECOMMENDATIONS
Maintain frequent oral hygiene.    GOAL: Pt will obtain safe and adequate means of nutrition/hydration/medications via least restrictive diet.

## 2025-02-24 NOTE — H&P ADULT - NSHPPHYSICALEXAM_GEN_ALL_CORE
PHYSICAL EXAM:   GENERAL: Alert. +confused. No acute distress. Not thin. Not cachectic. Not obese.  HEAD:  Atraumatic. Normocephalic.  EYES: Normal conjunctiva/sclera.  ENT: Neck supple. No JVD. Dry oral mucosa.   LYMPH: Normal supraclavicular/cervical lymph nodes.   CARDIAC: Not tachy, Not armando. Regular rhythm. Not irregularly irregular. S1. S2. No murmur. No rub. No distant heart sounds.  LUNG/CHEST: CTAB. BS equal bilaterally. No wheezes. No rales. No rhonchi.  ABDOMEN: Soft. No tenderness. No distension. No fluid wave. Normal bowel sounds.  BACK: No midline/vertebral tenderness. No flank tenderness.  VASCULAR: +2 b/l radial or ulnar pulses. Palpable DP pulses.  EXTREMITIES:  No clubbing. No cyanosis. No edema. Moving all 4.  NEUROLOGY: Moving b/l LE. Nonverbal.  PSYCH: Inappropriate behavior. Flat affect.    T(C): 37 (23 Feb 2025 20:27), Max: 37 (23 Feb 2025 20:27)  T(F): 98.6 (23 Feb 2025 20:27), Max: 98.6 (23 Feb 2025 20:27)  HR: 100 (23 Feb 2025 20:27) (86 - 100)  BP: 178/76 (23 Feb 2025 20:27) (146/63 - 191/77)  BP(mean): --  ABP: --  ABP(mean): --  RR: 19 (23 Feb 2025 20:27) (17 - 19)  SpO2: 98% (23 Feb 2025 20:27) (91% - 98%)    O2 Parameters below as of 23 Feb 2025 20:27  Patient On (Oxygen Delivery Method): nasal cannula  O2 Flow (L/min): 2        I&O's Summary

## 2025-02-24 NOTE — SWALLOW BEDSIDE ASSESSMENT ADULT - SWALLOW EVAL: PATIENT/FAMILY GOALS STATEMENT
Pt unable to state given cognitive status. Per daughter Miguelina, pt w/o hx of swallowing difficulty; consumed regular diet PTA. 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 Daughter reports pt is not currently at her baseline in regards to cognitive status.

## 2025-02-24 NOTE — CHART NOTE - NSCHARTNOTEFT_GEN_A_CORE
patient seen and examined earlier today in the ED. son at bedside reports patient is at baseline alert, oriented to place and people, interactive, plays bingo. He noticed patient has been declining since 2/14 when she developed cough, diagnosed with FLU and PNA. No prior issue with swallowing. Patient is a long term resident at [facility].     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 check MRSA PCR and if positive to consider adding IV vanco  - continue tamiflu  - no reported vomiting while in ED but per daughter, Miguelina, patient reportedly had one episode of vomiting at NH which prompted them to send the patient the ED.   - + dark liquid stool noted in ileostomy bag  - ostomy nurse to evaluate leaking noted on exam  - Hgb on admission 11, check repeat CBC  - continue IV PPI  - continue IVF while NPO  - follow up S&S eval  - lasix and asa/plavix on hold, continue losartan    - patient's HCP is daughter, Miguelina, who reports she would like for the patient to be resuscitated but may consider DNR if the patient's condition deteriorates and extraordinary measures are required to prolong her life. patient seen and examined earlier today in the ED. son at bedside reports patient is at baseline alert, oriented to place and people, interactive, plays bingo. He noticed patient has been declining since 2/14 when she developed cough, diagnosed with FLU and PNA. No prior issue with swallowing. Patient is a long term resident at [facility].     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 check MRSA PCR and if positive to consider adding IV vanco  - continue tamiflu  - no reported vomiting while in ED but per daughter, Miguelina, patient reportedly had one episode of vomiting at NH which prompted them to send the patient the ED.   - + dark liquid stool noted in ileostomy bag  - ostomy nurse to evaluate leaking noted on exam  - Hgb on admission 11, check repeat CBC  - continue IV PPI  - continue IVF while NPO  - follow up S&S eval  - lasix and asa/plavix on hold, continue losartan    - patient's HCP is daughter, Miguelina, who reports she would like for the patient to be resuscitated but may consider DNR if the patient's condition deteriorates and extraordinary measures are required to prolong her life. patient seen and examined earlier today in the ED. son at bedside reports patient is at baseline alert, oriented to place and people, interactive, plays bingo. He noticed patient has been declining since 2/14 when she developed cough, diagnosed with FLU and PNA. No prior issue with swallowing. Patient is a long term resident at [facility].     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 check MRSA PCR and if positive to consider adding IV vanco  - continue tamiflu  - no reported vomiting while in ED but per daughter, Miguelina, patient reportedly had one episode of vomiting at NH which prompted them to send the patient the ED.   - + dark liquid stool noted in ileostomy bag  - ostomy nurse to evaluate leaking noted on exam  - Hgb on admission 11, check repeat CBC  - continue IV PPI  - continue IVF while NPO  - follow up S&S eval  - lasix and asa/plavix on hold, continue losartan  - consider low dose seroquel prn agitation    - patient's HCP is daughter, Miguelina, who reports she would like for the patient to be resuscitated but may consider DNR if the patient's condition deteriorates and extraordinary measures are required to prolong her life. patient seen and examined earlier today in the ED. son at bedside reports patient is at baseline alert, oriented to place and people, interactive, plays bingo. He noticed patient has been declining since 2/14 when she developed cough, diagnosed with FLU and PNA. No prior issue with swallowing. Patient is a long term resident at [facility].     #Influenza/PNA/?UTI  #Suspected acute metabolic encephalopathy in background of dementia  #CKD3-4  #Mild hypoxia  #s/p proctocolectomy with ileostomy after child birth  #PAF/CAD    - CT head neg for acute pathology  - CT c/a/p with LLL consolidation  - continue IV zosyn, stop zithromax. check MRSA PCR and if positive to consider adding IV vanco  - continue tamiflu  - no reported vomiting while in ED but per daughter, Miguelina, patient reportedly had one episode of vomiting at NH which prompted them to send the patient the ED.   - + dark liquid stool noted in ileostomy bag  - ostomy nurse to evaluate leaking noted on exam  - Hgb on admission 11, check repeat CBC  - continue IV PPI  - continue IVF while NPO  - follow up S&S eval  - lasix and asa/plavix on hold, continue losartan  - consider low dose seroquel prn agitation    - patient's HCP is daughter, Miguelina, who reports she would like for the patient to be resuscitated but may consider DNR if the patient's condition deteriorates and extraordinary measures are required to prolong her life. patient seen and examined earlier today in the ED. son at bedside reports patient is at baseline alert, oriented to place and people, interactive, plays bingo. He noticed patient has been declining since 2/14 when she developed cough, diagnosed with FLU and PNA. No prior issue with swallowing. Patient is a long term resident at [facility].     #Influenza/PNA/?UTI  #Suspected acute metabolic encephalopathy in background of dementia  #CKD3-4  #Mild hypoxia  #s/p proctocolectomy with ileostomy after child birth  #PAF/CAD    - CT head neg for acute pathology  - CT c/a/p with LLL consolidation  - continue IV zosyn, stop zithromax. check MRSA PCR and if positive to consider adding IV vanco  - continue tamiflu  - no reported vomiting while in ED but per daughter, Miguelina, patient reportedly had one episode of vomiting at NH which prompted them to send the patient the ED.   - + dark brown liquid stool noted in ileostomy bag  - ostomy nurse to evaluate leaking noted on exam  - Hgb on admission 11, check repeat CBC  - continue IV PPI  - continue IVF while NPO  - follow up S&S eval  - lasix and asa/plavix on hold, continue losartan  - consider low dose seroquel prn agitation    - patient's HCP is daughter, Miguelina, who reports she would like for the patient to be resuscitated but may consider DNR if the patient's condition deteriorates and extraordinary measures are required to prolong her life. patient seen and examined earlier today in the ED. son at bedside reports patient is at baseline alert, oriented to place and people, interactive, plays bingo. He noticed patient has been declining since 2/14 when she developed cough, diagnosed with FLU and PNA. No prior issue with swallowing. Patient is a long term resident at [facility].     #Influenza/PNA  #Suspected acute metabolic encephalopathy in background of dementia  #CKD3-4  #Mild hypoxia  #s/p proctocolectomy with ileostomy after child birth  #PAF/CAD    - CT head neg for acute pathology  - CT c/a/p with LLL consolidation  - continue IV zosyn, stop zithromax. check MRSA PCR and if positive to consider adding IV vanco  - continue tamiflu  - Urine cx neg, f/u blood cx  - no reported vomiting while in ED but per daughter, Miguelina, patient reportedly had one episode of vomiting at NH which prompted them to send the patient the ED.   - + dark brown liquid stool noted in ileostomy bag  - ostomy nurse to evaluate leaking noted on exam  - Hgb on admission 11, check repeat CBC  - continue IV PPI  - continue IVF while NPO  - follow up S&S eval  - lasix and asa/plavix on hold, continue losartan  - consider low dose haldol IM prn agitation if patient not able to tolerate PO    - patient's HCP is daughter, Miguelina, who reports she would like for the patient to be resuscitated but may consider DNR if the patient's condition deteriorates and extraordinary measures are required to prolong her life.

## 2025-02-25 DIAGNOSIS — R41.82 ALTERED MENTAL STATUS, UNSPECIFIED: ICD-10-CM

## 2025-02-25 DIAGNOSIS — Z95.1 PRESENCE OF AORTOCORONARY BYPASS GRAFT: ICD-10-CM

## 2025-02-25 LAB
A1C WITH ESTIMATED AVERAGE GLUCOSE RESULT: 6.7 % — HIGH (ref 4–5.6)
ANION GAP SERPL CALC-SCNC: 13 MMOL/L — SIGNIFICANT CHANGE UP (ref 5–17)
ANION GAP SERPL CALC-SCNC: 16 MMOL/L — SIGNIFICANT CHANGE UP (ref 5–17)
APTT BLD: 29.2 SEC — SIGNIFICANT CHANGE UP (ref 24.5–35.6)
BLD GP AB SCN SERPL QL: POSITIVE — SIGNIFICANT CHANGE UP
BUN SERPL-MCNC: 24 MG/DL — HIGH (ref 7–23)
BUN SERPL-MCNC: 26 MG/DL — HIGH (ref 7–23)
CALCIUM SERPL-MCNC: 8.4 MG/DL — SIGNIFICANT CHANGE UP (ref 8.4–10.5)
CALCIUM SERPL-MCNC: 9.1 MG/DL — SIGNIFICANT CHANGE UP (ref 8.4–10.5)
CHLORIDE SERPL-SCNC: 112 MMOL/L — HIGH (ref 96–108)
CHLORIDE SERPL-SCNC: 118 MMOL/L — HIGH (ref 96–108)
CO2 SERPL-SCNC: 21 MMOL/L — LOW (ref 22–31)
CO2 SERPL-SCNC: 22 MMOL/L — SIGNIFICANT CHANGE UP (ref 22–31)
CREAT SERPL-MCNC: 1.68 MG/DL — HIGH (ref 0.5–1.3)
CREAT SERPL-MCNC: 1.78 MG/DL — HIGH (ref 0.5–1.3)
DAT C3-SP REAG RBC QL: NEGATIVE — SIGNIFICANT CHANGE UP
EGFR: 29 ML/MIN/1.73M2 — LOW
EGFR: 29 ML/MIN/1.73M2 — LOW
EGFR: 32 ML/MIN/1.73M2 — LOW
EGFR: 32 ML/MIN/1.73M2 — LOW
ESTIMATED AVERAGE GLUCOSE: 146 MG/DL — HIGH (ref 68–114)
GLUCOSE BLDC GLUCOMTR-MCNC: 175 MG/DL — HIGH (ref 70–99)
GLUCOSE BLDC GLUCOMTR-MCNC: 177 MG/DL — HIGH (ref 70–99)
GLUCOSE BLDC GLUCOMTR-MCNC: 184 MG/DL — HIGH (ref 70–99)
GLUCOSE BLDC GLUCOMTR-MCNC: 188 MG/DL — HIGH (ref 70–99)
GLUCOSE BLDC GLUCOMTR-MCNC: 204 MG/DL — HIGH (ref 70–99)
GLUCOSE SERPL-MCNC: 170 MG/DL — HIGH (ref 70–99)
GLUCOSE SERPL-MCNC: 222 MG/DL — HIGH (ref 70–99)
HCT VFR BLD CALC: 36.7 % — SIGNIFICANT CHANGE UP (ref 34.5–45)
HGB BLD-MCNC: 11.4 G/DL — LOW (ref 11.5–15.5)
INR BLD: 1.24 RATIO — HIGH (ref 0.85–1.16)
MAGNESIUM SERPL-MCNC: 2.1 MG/DL — SIGNIFICANT CHANGE UP (ref 1.6–2.6)
MCHC RBC-ENTMCNC: 29.2 PG — SIGNIFICANT CHANGE UP (ref 27–34)
MCHC RBC-ENTMCNC: 31.1 G/DL — LOW (ref 32–36)
MCV RBC AUTO: 94.1 FL — SIGNIFICANT CHANGE UP (ref 80–100)
MRSA PCR RESULT.: SIGNIFICANT CHANGE UP
NRBC BLD AUTO-RTO: 0 /100 WBCS — SIGNIFICANT CHANGE UP (ref 0–0)
PHOSPHATE SERPL-MCNC: 2.1 MG/DL — LOW (ref 2.5–4.5)
PLATELET # BLD AUTO: 334 K/UL — SIGNIFICANT CHANGE UP (ref 150–400)
POTASSIUM SERPL-MCNC: 4 MMOL/L — SIGNIFICANT CHANGE UP (ref 3.5–5.3)
POTASSIUM SERPL-MCNC: 4.2 MMOL/L — SIGNIFICANT CHANGE UP (ref 3.5–5.3)
POTASSIUM SERPL-SCNC: 4 MMOL/L — SIGNIFICANT CHANGE UP (ref 3.5–5.3)
POTASSIUM SERPL-SCNC: 4.2 MMOL/L — SIGNIFICANT CHANGE UP (ref 3.5–5.3)
PROTHROM AB SERPL-ACNC: 14.2 SEC — HIGH (ref 9.9–13.4)
RBC # BLD: 3.9 M/UL — SIGNIFICANT CHANGE UP (ref 3.8–5.2)
RBC # FLD: 13.5 % — SIGNIFICANT CHANGE UP (ref 10.3–14.5)
RH IG SCN BLD-IMP: POSITIVE — SIGNIFICANT CHANGE UP
S AUREUS DNA NOSE QL NAA+PROBE: DETECTED
SODIUM SERPL-SCNC: 150 MMOL/L — HIGH (ref 135–145)
SODIUM SERPL-SCNC: 152 MMOL/L — HIGH (ref 135–145)
WBC # BLD: 10.03 K/UL — SIGNIFICANT CHANGE UP (ref 3.8–10.5)
WBC # FLD AUTO: 10.03 K/UL — SIGNIFICANT CHANGE UP (ref 3.8–10.5)

## 2025-02-25 PROCEDURE — 99232 SBSQ HOSP IP/OBS MODERATE 35: CPT | Mod: GC

## 2025-02-25 PROCEDURE — 86077 PHYS BLOOD BANK SERV XMATCH: CPT

## 2025-02-25 RX ORDER — SODIUM CHLORIDE 9 G/1000ML
1000 INJECTION, SOLUTION INTRAVENOUS
Refills: 0 | Status: DISCONTINUED | OUTPATIENT
Start: 2025-02-25 | End: 2025-02-25

## 2025-02-25 RX ORDER — PIPERACILLIN-TAZO-DEXTROSE,ISO 3.375G/5
3.38 IV SOLUTION, PIGGYBACK PREMIX FROZEN(ML) INTRAVENOUS EVERY 8 HOURS
Refills: 0 | Status: DISCONTINUED | OUTPATIENT
Start: 2025-02-25 | End: 2025-02-28

## 2025-02-25 RX ORDER — NYSTATIN 100000 [USP'U]/G
1 CREAM TOPICAL
Refills: 0 | Status: DISCONTINUED | OUTPATIENT
Start: 2025-02-25 | End: 2025-03-05

## 2025-02-25 RX ORDER — INSULIN LISPRO 100 U/ML
INJECTION, SOLUTION INTRAVENOUS; SUBCUTANEOUS EVERY 6 HOURS
Refills: 0 | Status: DISCONTINUED | OUTPATIENT
Start: 2025-02-25 | End: 2025-02-28

## 2025-02-25 RX ORDER — CALAMINE 8% AND ZINC OXIDE 8% 160 MG/ML
1 LOTION TOPICAL
Refills: 0 | Status: DISCONTINUED | OUTPATIENT
Start: 2025-02-25 | End: 2025-03-05

## 2025-02-25 RX ORDER — ASPIRIN 325 MG
81 TABLET ORAL DAILY
Refills: 0 | Status: DISCONTINUED | OUTPATIENT
Start: 2025-02-25 | End: 2025-03-03

## 2025-02-25 RX ORDER — ACETAMINOPHEN 500 MG/5ML
1000 LIQUID (ML) ORAL ONCE
Refills: 0 | Status: COMPLETED | OUTPATIENT
Start: 2025-02-25 | End: 2025-02-25

## 2025-02-25 RX ORDER — SODIUM CHLORIDE 9 G/1000ML
1000 INJECTION, SOLUTION INTRAVENOUS
Refills: 0 | Status: DISCONTINUED | OUTPATIENT
Start: 2025-02-25 | End: 2025-02-26

## 2025-02-25 RX ORDER — SODIUM CHLORIDE 9 G/1000ML
1000 INJECTION, SOLUTION INTRAVENOUS
Refills: 0 | Status: DISCONTINUED | OUTPATIENT
Start: 2025-02-25 | End: 2025-03-05

## 2025-02-25 RX ADMIN — Medication 5 MILLIGRAM(S): at 13:47

## 2025-02-25 RX ADMIN — INSULIN LISPRO 1: 100 INJECTION, SOLUTION INTRAVENOUS; SUBCUTANEOUS at 12:16

## 2025-02-25 RX ADMIN — Medication 400 MILLIGRAM(S): at 02:36

## 2025-02-25 RX ADMIN — Medication 25 GRAM(S): at 13:46

## 2025-02-25 RX ADMIN — INSULIN LISPRO 2: 100 INJECTION, SOLUTION INTRAVENOUS; SUBCUTANEOUS at 08:17

## 2025-02-25 RX ADMIN — Medication 25 GRAM(S): at 22:37

## 2025-02-25 RX ADMIN — Medication 40 MILLIGRAM(S): at 06:51

## 2025-02-25 RX ADMIN — METOPROLOL SUCCINATE 120 MILLIGRAM(S): 50 TABLET, EXTENDED RELEASE ORAL at 06:53

## 2025-02-25 RX ADMIN — INSULIN LISPRO 1: 100 INJECTION, SOLUTION INTRAVENOUS; SUBCUTANEOUS at 17:45

## 2025-02-25 RX ADMIN — Medication 40 MILLIGRAM(S): at 17:40

## 2025-02-25 RX ADMIN — SODIUM CHLORIDE 50 MILLILITER(S): 9 INJECTION, SOLUTION INTRAVENOUS at 01:09

## 2025-02-25 RX ADMIN — NYSTATIN 1 APPLICATION(S): 100000 CREAM TOPICAL at 06:52

## 2025-02-25 RX ADMIN — Medication 1000 MILLIGRAM(S): at 03:06

## 2025-02-25 RX ADMIN — METOPROLOL SUCCINATE 120 MILLIGRAM(S): 50 TABLET, EXTENDED RELEASE ORAL at 00:09

## 2025-02-25 RX ADMIN — SODIUM CHLORIDE 50 MILLILITER(S): 9 INJECTION, SOLUTION INTRAVENOUS at 13:36

## 2025-02-25 RX ADMIN — Medication 25 GRAM(S): at 06:51

## 2025-02-25 RX ADMIN — NYSTATIN 1 APPLICATION(S): 100000 CREAM TOPICAL at 17:40

## 2025-02-25 RX ADMIN — METOPROLOL SUCCINATE 120 MILLIGRAM(S): 50 TABLET, EXTENDED RELEASE ORAL at 17:41

## 2025-02-25 NOTE — CHART NOTE - NSCHARTNOTEFT_GEN_A_CORE
74yoF w/ hx of dementia, GERD, p/w acute metabolic encephalopathy iso influenza, CAP, poss UTI, and poss coffee ground emesis; CT chest w/ LLL opacity and scattered groundglass and tree-in-bud opacities.     Swallow Hx: Pt seen for initial bedside swallow evaluation 02/24, w/ rx to maintain NPO, with non-oral nutrition/hydration/medications given pt w/ orally defensive behaviors negatively impacting participation in the feeding task.     TODAY, pt seen for swallow re-evaluation. Son present at time of arrival but leaving hospital shortly after brief encounter w/ this SLP. Son reported pt is slightly more interactive today; son reiterated that pt w/o hx of swallowing difficulty and consumed regular diet PTA. Pt encountered awake, alert, on RA, +b/l mittens. Nonverbal; more engaged w/ clinician as compared to previous encounter (tracking movements of clinician, making eye contact). +baseline deep congested nonproductive cough noted. Trace dried secretions lining labial surfaces and visible in anterior portion of oral cavity. Attempted to perform oral care; however, pt immediately shook head back and forth despite maximal encouragement and rationale provided, stating "No, no!", appearing to become increasingly more agitated as attempts progressed (including presentation of moderately thick liquid and apple sauce). Further attempts deferred. Purposeful proactive rounding reinforced and 5 Ps addressed. Pt left in no distress.     Impressions: Pt continues to present w/ orally defensive behaviors negatively impacting participation in the feeding task; therefore, unable to make official dietary recommendation at this time s/p x2 bedside swallow evaluations. D/w Dr. Dhruv Fatima to consider GOC discussion re: provision of nutrition/hydration/medications.    Recommendations:  1. Consider maintenance of NPO, with non-oral nutrition/hydration/medications.   2. Strict aspiration precautions for secretions and if enteral feeds are initiated.   3. Maintain stringent oral care.   4. d/c TBD pending hospital course.  5. This service will follow pending results of GOC discussion and pending improvement in pt participation.     GOAL: Pt will obtain safe and adequate means of nutrition/hydration/medications via least restrictive diet.    Above d/w pt, ARIANNA Muniz, and T8 Dr. Dhruv Fatima.    Vero Mao MA, CCC-SLP   Speech-Language Pathologist  office [phone]  TEAMS preferred 74yoF w/ hx of dementia, GERD, p/w acute metabolic encephalopathy iso influenza, CAP, poss UTI, and poss coffee ground emesis; CT chest w/ LLL opacity and scattered groundglass and tree-in-bud opacities.     Swallow Hx: Pt seen for initial bedside swallow evaluation 02/24, w/ rx to maintain NPO, with non-oral nutrition/hydration/medications given pt w/ orally defensive behaviors negatively impacting participation in the feeding task.     TODAY, pt seen for swallow re-evaluation. Son present at time of arrival but leaving hospital shortly after brief encounter w/ this SLP. Son reported pt is slightly more interactive today; son reiterated that pt w/o hx of swallowing difficulty and consumed regular diet PTA. Pt encountered awake, alert, on RA, +b/l mittens. Nonverbal; more engaged w/ clinician as compared to previous encounter (tracking movements of clinician, making eye contact). +baseline deep congested nonproductive cough noted across duration of encounter. Trace dried secretions lining labial surfaces and visible in anterior portion of oral cavity. Attempted to perform oral care; however, pt immediately shook head back and forth despite maximal encouragement and rationale provided, stating "No, no!", appearing to become increasingly more agitated as attempts progressed (including presentation of moderately thick liquid and apple sauce). Further attempts deferred. Purposeful proactive rounding reinforced and 5 Ps addressed. Pt left in no distress.     Impressions: Pt continues to present w/ orally defensive behaviors negatively impacting participation in the feeding task; therefore, unable to make official dietary recommendation at this time s/p x2 bedside swallow evaluations. D/w Dr. Dhruv Fatima to consider initiating GOC discussion w/ pt's family to determine if short-term means of nutrition/hydration/medications are within pt/family wishes.    Recommendations:  1. Consider maintenance of NPO, with non-oral nutrition/hydration/medications.   2. Strict aspiration precautions for secretions and if enteral feeds are initiated.   3. Maintain stringent oral care.   4. d/c TBD pending hospital course.  5. This service will follow pending results of GOC discussion and pending improvement in pt participation.     GOAL: Pt will obtain safe and adequate means of nutrition/hydration/medications via least restrictive diet.    Above d/w pt, ARIANNA Muniz, and T8 Dr. Dhruv Fatima.    Vero Mao MA, CCC-SLP   Speech-Language Pathologist  office [phone]  TEAMS preferred

## 2025-02-25 NOTE — PROGRESS NOTE ADULT - SUBJECTIVE AND OBJECTIVE BOX
Dhruv Radford M.D.   PGY-1 Internal Medicine  ** Note not finalized until signed by attending **   --------------------------  Dhruv Radford MD  Internal Medicine   PGY-1  --------------------------    SUBJECTIVE / OVERNIGHT EVENTS:    Pt seen in AM at bedside, resting comfortably in bed, appearing comfortable, unable to verbalize. Unable to perform ROS given non-verbal state.    MEDICATIONS  (STANDING):  aspirin  chewable 81 milliGRAM(s) Oral daily  atorvastatin 10 milliGRAM(s) Oral at bedtime  dextrose 5% + sodium chloride 0.9%. 1000 milliLiter(s) (50 mL/Hr) IV Continuous <Continuous>  dextrose 5%. 1000 milliLiter(s) (50 mL/Hr) IV Continuous <Continuous>  dextrose 5%. 1000 milliLiter(s) (100 mL/Hr) IV Continuous <Continuous>  dextrose 50% Injectable 25 Gram(s) IV Push once  dextrose 50% Injectable 12.5 Gram(s) IV Push once  dextrose 50% Injectable 25 Gram(s) IV Push once  gabapentin 100 milliGRAM(s) Oral three times a day  glucagon  Injectable 1 milliGRAM(s) IntraMuscular once  hydrALAZINE Injectable 5 milliGRAM(s) IV Push three times a day  insulin lispro (ADMELOG) corrective regimen sliding scale   SubCutaneous three times a day before meals  insulin lispro (ADMELOG) corrective regimen sliding scale   SubCutaneous at bedtime  losartan 25 milliGRAM(s) Oral daily  metoprolol tartrate IVPB 10 milliGRAM(s) IV Intermittent every 6 hours  nystatin Powder 1 Application(s) Topical two times a day  pancrelipase  (CREON  6,000 Lipase Units) 1 Capsule(s) Oral three times a day with meals  pantoprazole  Injectable 40 milliGRAM(s) IV Push two times a day  piperacillin/tazobactam IVPB.. 3.375 Gram(s) IV Intermittent every 8 hours    MEDICATIONS  (PRN):  calamine/zinc oxide Lotion 1 Application(s) Topical two times a day PRN Rash and/or Itching  dextrose Oral Gel 15 Gram(s) Oral once PRN Blood Glucose LESS THAN 70 milliGRAM(s)/deciliter    CAPILLARY BLOOD GLUCOSE    POCT Blood Glucose.: 188 mg/dL (25 Feb 2025 11:59)  POCT Blood Glucose.: 204 mg/dL (25 Feb 2025 08:16)  POCT Blood Glucose.: 184 mg/dL (25 Feb 2025 07:02)  POCT Blood Glucose.: 177 mg/dL (25 Feb 2025 00:14)  POCT Blood Glucose.: 157 mg/dL (24 Feb 2025 21:40)  POCT Blood Glucose.: 177 mg/dL (24 Feb 2025 18:19)    I&O's Summary    24 Feb 2025 07:01  -  25 Feb 2025 07:00  --------------------------------------------------------  IN: 900 mL / OUT: 350 mL / NET: 550 mL    PHYSICAL EXAM:  Vital Signs Last 24 Hrs  T(C): 37.2 (25 Feb 2025 11:44), Max: 37.2 (25 Feb 2025 10:30)  T(F): 99 (25 Feb 2025 11:44), Max: 99 (25 Feb 2025 10:30)  HR: 72 (25 Feb 2025 11:44) (70 - 102)  BP: 171/76 (25 Feb 2025 11:44) (165/95 - 194/82)  RR: 18 (25 Feb 2025 11:44) (18 - 18)  SpO2: 98% (25 Feb 2025 11:44) (95% - 99%)    Parameters below as of 25 Feb 2025 11:44  Patient On (Oxygen Delivery Method): room air    CONSTITUTIONAL: NAD; well-developed  HEENT: PERRL, clear conjunctiva  RESPIRATORY: Normal respiratory effort; lungs are clear to auscultation bilaterally; No Crackles/Rhonchi/Wheezing  CARDIOVASCULAR: Regular rate and rhythm, normal S1 and S2, no murmur/rub/gallop; No lower extremity edema; Peripheral pulses are 2+ bilaterally  ABDOMEN: Nontender to palpation, normoactive bowel sounds, no rebound/guarding; No hepatosplenomegaly  MUSCULOSKELETAL: no clubbing or cyanosis of digits; no joint swelling or tenderness to palpation  EXTREMITY: Lower extremities Non-tender to palpation; non-erythematous B/L  NEURO: A&Ox0; non-verbal  PSYCH: normal mood; Affect appropirate    LABS:                        11.4   10.03 )-----------( 334      ( 25 Feb 2025 10:42 )             36.7     02-25    150[H]  |  112[H]  |  26[H]  ----------------------------<  222[H]  4.2   |  22  |  1.78[H]    Ca    9.1      25 Feb 2025 10:40  Phos  2.1     02-25  Mg     2.1     02-25    TPro  8.3  /  Alb  3.6  /  TBili  0.6  /  DBili  x   /  AST  27  /  ALT  13  /  AlkPhos  92  02-23    PT/INR - ( 25 Feb 2025 10:40 )   PT: 14.2 sec;   INR: 1.24 ratio    PTT - ( 25 Feb 2025 10:40 )  PTT:29.2 sec    Urinalysis Basic - ( 25 Feb 2025 10:40 )    Color: x / Appearance: x / SG: x / pH: x  Gluc: 222 mg/dL / Ketone: x  / Bili: x / Urobili: x   Blood: x / Protein: x / Nitrite: x   Leuk Esterase: x / RBC: x / WBC x   Sq Epi: x / Non Sq Epi: x / Bacteria: x    Culture - Urine (collected 23 Feb 2025 16:53)  Source: Clean Catch Clean Catch (Midstream)  Final Report (24 Feb 2025 15:39):    No growth    Culture - Blood (collected 23 Feb 2025 16:36)  Source: .Blood Blood-Peripheral  Preliminary Report (24 Feb 2025 22:02):    No growth at 24 hours    Culture - Blood (collected 23 Feb 2025 16:30)  Source: .Blood Blood-Peripheral  Preliminary Report (24 Feb 2025 22:02):    No growth at 24 hours    RADIOLOGY & ADDITIONAL TESTS:  Results Reviewed: X  Imaging Personally Reviewed: X  Electrocardiogram Personally Reviewed: X

## 2025-02-25 NOTE — PROGRESS NOTE ADULT - ATTENDING COMMENTS
75yo F pmh HTN, DM2, UC s/p colectomy and ileostomy, s/p CABG. CHF? GERD, pancreatic insufficiency, migraines, anxiety, venous insufficiency, CKD (baseline Cr ~1.8), recent influenza diagnosed 6 days ago, presented 2/23 with acute metabolic encephalopathy (baseline a&ox4, conversational) in setting of influenza, possible aspiration pneumonia, poss UTI, and reported coffee ground emesis on empiric abx with slow improvement per family.    1. Acute encephalopathy: Patient AOx4 at baseline; per daughter, patient takes PO independently, calls on the phone, Facetime, play Bingo  - At current, AOx0. Per family no history of dementia  - Ddx: Flu vs PNA vs UTI  - f/u S/S: At current, NPO  - correct hypernatremia as below  - Convert appropriate home meds to IV if possible  - Per daughter, would want NGT if PO status continues to not improve.  - Flu, PNA, UTI plans per problems below.    2. Influenza A and parainfluenza:   - Supportive care  - d/c'd Tamiflu started by admitting team overnight given diagnosis >6 days ago.     Problem/Plan - 3:  ·  Problem: CAP (community acquired pneumonia).   ·  Plan: CT Chest w/ LLL opacity concerning for CAP vs aspiration PNA.    Plan:   - f/u urine legionella, strep pneumo  - IV Zosyn for 5x days for aspiration coverage  - defer Azithro for atypical given lobar pattern  - f/u Procal  - trend WBC, fever curve.     Problem/Plan - 4:  ·  Problem: Acute UTI.   ·  Plan: Patient w/ AMS, possible 2.2 to acute UTI.   - UA w/ LEs, Bacteria, WBC    Plan:   - Zosyn as above  - UCx 24 hours: NGTD.     Problem/Plan - 5:  ·  Problem: Coffee ground emesis.   ·  Plan: Obtained collateral for daughter -- definite episode of emesis at NH; possible c/f coffee ground.    Plan:  - c/w Protonix 40 IV BID  - Holding home ASA/Plavix for +/- UGIB/current NPO status  - f/u type and screen  - trend H/H  - CTM.     Problem/Plan - 6:  ·  Problem: S/P CABG x 4.   ·  Plan: Hx of CABG x4 in 2017    Plan:   - Hold DAPT i/s/o NPO status and +/- hematemesis.     Problem/Plan - 7:  ·  Problem: HTN (hypertension).   ·  Plan: w/ hx of HTN  - on home oral meds    Plan:   - While NPO -> Lopressor 10 mg IVP q6; Hydral 5 TID for HTN   - Hold home Lasix.     Problem/Plan - 8:  ·  Problem: DM2 (diabetes mellitus, type 2).   ·  Plan: w/ Hx of T2DM    Plan:  - ISS only  - f/u A1c. 73yo F pmh HTN, DM2, UC s/p colectomy and ileostomy, s/p CABG. CHF? GERD, pancreatic insufficiency, migraines, anxiety, venous insufficiency, CKD (baseline Cr ~1.8), recent influenza diagnosed 6 days ago, presented 2/23 with acute metabolic encephalopathy (baseline a&ox4, conversational) in setting of influenza, possible aspiration pneumonia, poss UTI, and reported coffee ground emesis on empiric abx now with hypernatremia overall with slow improvement per family on empiric abx.    1. Acute encephalopathy: Patient AOx4 at baseline; per daughter, patient takes PO independently, calls on the phone, Facetime, play Bingo  - At current, AOx0. Per family no history of dementia  - Ddx: Flu vs PNA vs UTI  - f/u S/S: At current, NPO  - correct hypernatremia as below  - Flu, PNA, UTI plans per problems below.    2. Influenza A and parainfluenza:   - Supportive care  - d/c'd Tamiflu started by admitting team overnight given diagnosis >6 days ago.    3. Possible aspiration PNA: currently afebrile with resolving wbc.  Family reports improvement on abx.  Possible superimposed bacterial on viral pneumonia vs aspiration.  - f/u urine legionella, strep pneumo  - IV Zosyn for 5x days for aspiration coverage  - defer Azithro for atypical given lobar pattern  - f/u Procal-if negative would favor this is viral infection  - trend WBC, fever curve.    4. Possible UTI:  - UA w/ LEs, Bacteria, WBC  -f/u urine culture negative  -continuing abx for pna as above    5. Coffee ground emesis:  -no recurrence here and hb stable   - c/w Protonix 40 IV BID  -resume home asa  - trend H/H    6. S/P CABG x 4.   Hx of CABG x4 in 2017  resume asa    7. HTN:   - on home oral meds  - While NPO -> Lopressor 10 mg IVP q6; Hydral 5 TID for HTN   - Hold home Lasix.    8. Hypernatremia: give 1/2NS and trend Na bid    9. DM2:  - ISS only  - f/u A1c    contact: TEAMS 73yo F pmh HTN, DM2, UC s/p colectomy and ileostomy, s/p CABG. CHF? GERD, pancreatic insufficiency, migraines, anxiety, venous insufficiency, CKD (baseline Cr ~1.8), recent influenza diagnosed 6 days ago, presented 2/23 with acute metabolic encephalopathy (baseline a&ox4, conversational) in setting of influenza, possible aspiration pneumonia, poss UTI, and reported coffee ground emesis on empiric abx now with hypernatremia overall with slow improvement per family on empiric abx.    1. Acute encephalopathy: Patient AOx4 at baseline; per daughter, patient takes PO independently, calls on the phone, Facetime, play Bingo  - At current, AOx0. Per family no history of dementia  - Ddx: Flu vs PNA vs UTI  - f/u S/S: At current, NPO  - correct hypernatremia as below  - Flu, PNA, UTI plans per problems below.    2. Influenza A and parainfluenza:   - Supportive care  - d/c'd Tamiflu started by admitting team overnight given diagnosis >6 days ago.    3. Possible aspiration PNA: currently afebrile with resolving wbc.  Family reports improvement on abx.  Possible superimposed bacterial on viral pneumonia vs aspiration.  - f/u urine legionella, strep pneumo  - IV Zosyn for 5x days for aspiration coverage  - defer Azithro for atypical given lobar pattern  - f/u Procal-if negative would favor this is viral infection  - trend WBC, fever curve.    4. Possible UTI:  - UA w/ LEs, Bacteria, WBC  -f/u urine culture negative  -continuing abx for pna as above    5. Coffee ground emesis:  -no recurrence here and hb stable   - c/w Protonix 40 IV BID  -resume home asa  - trend H/H    6. S/P CABG x 4.   Hx of CABG x4 in 2017  resume asa    7. HTN:   - on home oral meds  - While NPO -> Lopressor 10 mg IVP q6; Hydral 5 TID for HTN   - Hold home Lasix.    8. Hypernatremia: ~2L water deficit with Na 150.  Give 1/4NS @100/hour.  Repeat at 10pm.  If not improving by 1/2mEq/hour change to D5W.    9. DM2:  - ISS only  - f/u A1c    contact: TEAMS

## 2025-02-26 LAB
A1C WITH ESTIMATED AVERAGE GLUCOSE RESULT: 6.7 % — HIGH (ref 4–5.6)
ADD ON TEST-SPECIMEN IN LAB: SIGNIFICANT CHANGE UP
ALBUMIN SERPL ELPH-MCNC: 3.5 G/DL — SIGNIFICANT CHANGE UP (ref 3.3–5)
ALP SERPL-CCNC: 91 U/L — SIGNIFICANT CHANGE UP (ref 40–120)
ALT FLD-CCNC: 13 U/L — SIGNIFICANT CHANGE UP (ref 10–45)
ANION GAP SERPL CALC-SCNC: 15 MMOL/L — SIGNIFICANT CHANGE UP (ref 5–17)
ANION GAP SERPL CALC-SCNC: 16 MMOL/L — SIGNIFICANT CHANGE UP (ref 5–17)
AST SERPL-CCNC: 38 U/L — SIGNIFICANT CHANGE UP (ref 10–40)
BASOPHILS # BLD AUTO: 0.06 K/UL — SIGNIFICANT CHANGE UP (ref 0–0.2)
BASOPHILS NFR BLD AUTO: 0.6 % — SIGNIFICANT CHANGE UP (ref 0–2)
BILIRUB SERPL-MCNC: 0.9 MG/DL — SIGNIFICANT CHANGE UP (ref 0.2–1.2)
BUN SERPL-MCNC: 25 MG/DL — HIGH (ref 7–23)
BUN SERPL-MCNC: 26 MG/DL — HIGH (ref 7–23)
CALCIUM SERPL-MCNC: 8.6 MG/DL — SIGNIFICANT CHANGE UP (ref 8.4–10.5)
CALCIUM SERPL-MCNC: 8.8 MG/DL — SIGNIFICANT CHANGE UP (ref 8.4–10.5)
CHLORIDE SERPL-SCNC: 110 MMOL/L — HIGH (ref 96–108)
CHLORIDE SERPL-SCNC: 114 MMOL/L — HIGH (ref 96–108)
CO2 SERPL-SCNC: 16 MMOL/L — LOW (ref 22–31)
CO2 SERPL-SCNC: 22 MMOL/L — SIGNIFICANT CHANGE UP (ref 22–31)
CREAT SERPL-MCNC: 1.86 MG/DL — HIGH (ref 0.5–1.3)
CREAT SERPL-MCNC: 1.9 MG/DL — HIGH (ref 0.5–1.3)
EGFR: 27 ML/MIN/1.73M2 — LOW
EGFR: 27 ML/MIN/1.73M2 — LOW
EGFR: 28 ML/MIN/1.73M2 — LOW
EGFR: 28 ML/MIN/1.73M2 — LOW
EOSINOPHIL # BLD AUTO: 0.19 K/UL — SIGNIFICANT CHANGE UP (ref 0–0.5)
EOSINOPHIL NFR BLD AUTO: 1.8 % — SIGNIFICANT CHANGE UP (ref 0–6)
ESTIMATED AVERAGE GLUCOSE: 146 MG/DL — HIGH (ref 68–114)
GLUCOSE BLDC GLUCOMTR-MCNC: 179 MG/DL — HIGH (ref 70–99)
GLUCOSE BLDC GLUCOMTR-MCNC: 190 MG/DL — HIGH (ref 70–99)
GLUCOSE BLDC GLUCOMTR-MCNC: 195 MG/DL — HIGH (ref 70–99)
GLUCOSE BLDC GLUCOMTR-MCNC: 209 MG/DL — HIGH (ref 70–99)
GLUCOSE BLDC GLUCOMTR-MCNC: 269 MG/DL — HIGH (ref 70–99)
GLUCOSE SERPL-MCNC: 220 MG/DL — HIGH (ref 70–99)
GLUCOSE SERPL-MCNC: 272 MG/DL — HIGH (ref 70–99)
HCT VFR BLD CALC: 38.4 % — SIGNIFICANT CHANGE UP (ref 34.5–45)
HGB BLD-MCNC: 11.8 G/DL — SIGNIFICANT CHANGE UP (ref 11.5–15.5)
IMM GRANULOCYTES NFR BLD AUTO: 0.7 % — SIGNIFICANT CHANGE UP (ref 0–0.9)
LEGIONELLA AG UR QL: NEGATIVE — SIGNIFICANT CHANGE UP
LYMPHOCYTES # BLD AUTO: 1.34 K/UL — SIGNIFICANT CHANGE UP (ref 1–3.3)
LYMPHOCYTES # BLD AUTO: 13 % — SIGNIFICANT CHANGE UP (ref 13–44)
MAGNESIUM SERPL-MCNC: 2.3 MG/DL — SIGNIFICANT CHANGE UP (ref 1.6–2.6)
MCHC RBC-ENTMCNC: 29.6 PG — SIGNIFICANT CHANGE UP (ref 27–34)
MCHC RBC-ENTMCNC: 30.7 G/DL — LOW (ref 32–36)
MCV RBC AUTO: 96.2 FL — SIGNIFICANT CHANGE UP (ref 80–100)
MONOCYTES # BLD AUTO: 0.99 K/UL — HIGH (ref 0–0.9)
MONOCYTES NFR BLD AUTO: 9.6 % — SIGNIFICANT CHANGE UP (ref 2–14)
NEUTROPHILS # BLD AUTO: 7.65 K/UL — HIGH (ref 1.8–7.4)
NEUTROPHILS NFR BLD AUTO: 74.3 % — SIGNIFICANT CHANGE UP (ref 43–77)
NRBC BLD AUTO-RTO: 0 /100 WBCS — SIGNIFICANT CHANGE UP (ref 0–0)
PHOSPHATE SERPL-MCNC: 2 MG/DL — LOW (ref 2.5–4.5)
PLATELET # BLD AUTO: 345 K/UL — SIGNIFICANT CHANGE UP (ref 150–400)
POTASSIUM SERPL-MCNC: 3.5 MMOL/L — SIGNIFICANT CHANGE UP (ref 3.5–5.3)
POTASSIUM SERPL-MCNC: 3.8 MMOL/L — SIGNIFICANT CHANGE UP (ref 3.5–5.3)
POTASSIUM SERPL-SCNC: 3.5 MMOL/L — SIGNIFICANT CHANGE UP (ref 3.5–5.3)
POTASSIUM SERPL-SCNC: 3.8 MMOL/L — SIGNIFICANT CHANGE UP (ref 3.5–5.3)
PROCALCITONIN SERPL-MCNC: 0.12 NG/ML — HIGH (ref 0.02–0.1)
PROT SERPL-MCNC: 8.1 G/DL — SIGNIFICANT CHANGE UP (ref 6–8.3)
RBC # BLD: 3.99 M/UL — SIGNIFICANT CHANGE UP (ref 3.8–5.2)
RBC # FLD: 13.6 % — SIGNIFICANT CHANGE UP (ref 10.3–14.5)
SODIUM SERPL-SCNC: 141 MMOL/L — SIGNIFICANT CHANGE UP (ref 135–145)
SODIUM SERPL-SCNC: 152 MMOL/L — HIGH (ref 135–145)
WBC # BLD: 10.3 K/UL — SIGNIFICANT CHANGE UP (ref 3.8–10.5)
WBC # FLD AUTO: 10.3 K/UL — SIGNIFICANT CHANGE UP (ref 3.8–10.5)

## 2025-02-26 PROCEDURE — 73130 X-RAY EXAM OF HAND: CPT | Mod: 26,LT

## 2025-02-26 PROCEDURE — 99232 SBSQ HOSP IP/OBS MODERATE 35: CPT | Mod: GC

## 2025-02-26 PROCEDURE — 73110 X-RAY EXAM OF WRIST: CPT | Mod: 26,LT

## 2025-02-26 RX ORDER — METOPROLOL SUCCINATE 50 MG/1
100 TABLET, EXTENDED RELEASE ORAL DAILY
Refills: 0 | Status: DISCONTINUED | OUTPATIENT
Start: 2025-02-26 | End: 2025-03-05

## 2025-02-26 RX ORDER — SODIUM CHLORIDE 9 G/1000ML
1000 INJECTION, SOLUTION INTRAVENOUS
Refills: 0 | Status: DISCONTINUED | OUTPATIENT
Start: 2025-02-26 | End: 2025-02-26

## 2025-02-26 RX ORDER — B1/B2/B3/B5/B6/B12/VIT C/FOLIC 500-0.5 MG
1 TABLET ORAL DAILY
Refills: 0 | Status: DISCONTINUED | OUTPATIENT
Start: 2025-02-26 | End: 2025-03-05

## 2025-02-26 RX ORDER — CLOPIDOGREL BISULFATE 75 MG/1
75 TABLET, FILM COATED ORAL DAILY
Refills: 0 | Status: DISCONTINUED | OUTPATIENT
Start: 2025-02-26 | End: 2025-03-05

## 2025-02-26 RX ADMIN — Medication 25 GRAM(S): at 21:20

## 2025-02-26 RX ADMIN — INSULIN LISPRO 3: 100 INJECTION, SOLUTION INTRAVENOUS; SUBCUTANEOUS at 12:14

## 2025-02-26 RX ADMIN — METOPROLOL SUCCINATE 120 MILLIGRAM(S): 50 TABLET, EXTENDED RELEASE ORAL at 01:08

## 2025-02-26 RX ADMIN — METOPROLOL SUCCINATE 120 MILLIGRAM(S): 50 TABLET, EXTENDED RELEASE ORAL at 11:23

## 2025-02-26 RX ADMIN — Medication 1 CAPSULE(S): at 12:13

## 2025-02-26 RX ADMIN — CLOPIDOGREL BISULFATE 75 MILLIGRAM(S): 75 TABLET, FILM COATED ORAL at 17:02

## 2025-02-26 RX ADMIN — GABAPENTIN 100 MILLIGRAM(S): 400 CAPSULE ORAL at 21:20

## 2025-02-26 RX ADMIN — INSULIN LISPRO 2: 100 INJECTION, SOLUTION INTRAVENOUS; SUBCUTANEOUS at 17:57

## 2025-02-26 RX ADMIN — INSULIN LISPRO 1: 100 INJECTION, SOLUTION INTRAVENOUS; SUBCUTANEOUS at 07:49

## 2025-02-26 RX ADMIN — ATORVASTATIN CALCIUM 10 MILLIGRAM(S): 80 TABLET, FILM COATED ORAL at 21:20

## 2025-02-26 RX ADMIN — Medication 40 MILLIGRAM(S): at 17:02

## 2025-02-26 RX ADMIN — SODIUM CHLORIDE 125 MILLILITER(S): 9 INJECTION, SOLUTION INTRAVENOUS at 13:13

## 2025-02-26 RX ADMIN — NYSTATIN 1 APPLICATION(S): 100000 CREAM TOPICAL at 17:02

## 2025-02-26 RX ADMIN — Medication 25 GRAM(S): at 13:14

## 2025-02-26 RX ADMIN — SODIUM CHLORIDE 100 MILLILITER(S): 9 INJECTION, SOLUTION INTRAVENOUS at 00:17

## 2025-02-26 RX ADMIN — INSULIN LISPRO 1: 100 INJECTION, SOLUTION INTRAVENOUS; SUBCUTANEOUS at 01:07

## 2025-02-26 RX ADMIN — METOPROLOL SUCCINATE 120 MILLIGRAM(S): 50 TABLET, EXTENDED RELEASE ORAL at 05:45

## 2025-02-26 RX ADMIN — Medication 1 CAPSULE(S): at 17:02

## 2025-02-26 RX ADMIN — Medication 5 MILLIGRAM(S): at 06:47

## 2025-02-26 RX ADMIN — METOPROLOL SUCCINATE 100 MILLIGRAM(S): 50 TABLET, EXTENDED RELEASE ORAL at 12:43

## 2025-02-26 RX ADMIN — Medication 81 MILLIGRAM(S): at 11:23

## 2025-02-26 RX ADMIN — NYSTATIN 1 APPLICATION(S): 100000 CREAM TOPICAL at 05:46

## 2025-02-26 RX ADMIN — Medication 25 GRAM(S): at 05:45

## 2025-02-26 RX ADMIN — GABAPENTIN 100 MILLIGRAM(S): 400 CAPSULE ORAL at 14:53

## 2025-02-26 RX ADMIN — Medication 40 MILLIGRAM(S): at 05:45

## 2025-02-26 RX ADMIN — LOSARTAN POTASSIUM 25 MILLIGRAM(S): 100 TABLET, FILM COATED ORAL at 12:43

## 2025-02-26 NOTE — DIETITIAN INITIAL EVALUATION ADULT - ADD RECOMMEND
-- Recommend Nephro-Will supplement, pending no medical contraindications, for micronutrient support/aid wound healing.   -- Monitor PO intake, GI tolerance, skin integrity, labs, weight, and bowel movement regularity.   -- Honor dietary preferences as expressed as able.

## 2025-02-26 NOTE — DIETITIAN INITIAL EVALUATION ADULT - DIET TYPE
Recommend [id number] Consistent Carbohydrate diet restriction due to DM history, Diet texture per medical team/SLP.

## 2025-02-26 NOTE — DIETITIAN INITIAL EVALUATION ADULT - PERTINENT MEDS FT
MEDICATIONS  (STANDING):  aspirin  chewable 81 milliGRAM(s) Oral daily  atorvastatin 10 milliGRAM(s) Oral at bedtime  dextrose 5%. 1000 milliLiter(s) (50 mL/Hr) IV Continuous <Continuous>  dextrose 5%. 1000 milliLiter(s) (100 mL/Hr) IV Continuous <Continuous>  dextrose 50% Injectable 25 Gram(s) IV Push once  dextrose 50% Injectable 12.5 Gram(s) IV Push once  dextrose 50% Injectable 25 Gram(s) IV Push once  gabapentin 100 milliGRAM(s) Oral three times a day  glucagon  Injectable 1 milliGRAM(s) IntraMuscular once  insulin lispro (ADMELOG) corrective regimen sliding scale   SubCutaneous every 6 hours  losartan 25 milliGRAM(s) Oral daily  metoprolol succinate  milliGRAM(s) Oral daily  nystatin Powder 1 Application(s) Topical two times a day  pancrelipase  (CREON  6,000 Lipase Units) 1 Capsule(s) Oral three times a day with meals  pantoprazole  Injectable 40 milliGRAM(s) IV Push two times a day  piperacillin/tazobactam IVPB.. 3.375 Gram(s) IV Intermittent every 8 hours    MEDICATIONS  (PRN):  calamine/zinc oxide Lotion 1 Application(s) Topical two times a day PRN Rash and/or Itching  dextrose Oral Gel 15 Gram(s) Oral once PRN Blood Glucose LESS THAN 70 milliGRAM(s)/deciliter

## 2025-02-26 NOTE — DIETITIAN INITIAL EVALUATION ADULT - PROBLEM SELECTOR PLAN 4
- will start protonix 40 IV BID  - need to obtain collateral from daughter or [facility]  - holding home asa, plavix  - hold a/c  - type and screen  - trend hb  - may need GI consult once history is clarified
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
## 2025-02-26 NOTE — DIETITIAN INITIAL EVALUATION ADULT - NS FNS DIET ORDER
Diet, Regular:   Minced and Moist (MINCEDMOIST)  Moderately Thick Liquids (MODTHICKLIQS) (02-26-25 @ 12:17)
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
## 2025-02-26 NOTE — DIETITIAN INITIAL EVALUATION ADULT - PHYSCIAL ASSESSMENT
Drug Dosing Weight  Height (cm): 167.6 (23 Feb 2025 16:17)  Weight (kg): 68/150lb (23 Feb 2025 16:17) ? accuracy   BMI (kg/m2): 24.2 (23 Feb 2025 16:17)    Daily weight (standing or bed scale): none documented thus far   Weight obtained by RD: 89.3kg/197lb (2/26 bed scale)     Weight history:   Per pt: unable to recall  Per transfer paper: 213.4lb   Previous RD notes: no history   [facility] HIE: no history      ** Pt appears heavier than 150lb (? dosing weight accuracy). RD will continue to monitor wt trends as available/able.     IBW: 143lb, 138% IBW

## 2025-02-26 NOTE — DIETITIAN INITIAL EVALUATION ADULT - OTHER INFO
- Flu A  - on IVF D5% @ 100ml/hr  - DM: ordered for ISS to regulate blood glucose in house   - CKD per chart, abnormal renal indices and electrolytes noted   - pancreatic insufficiency ordered for Creon TID

## 2025-02-26 NOTE — DIETITIAN INITIAL EVALUATION ADULT - OTHER CALCULATIONS
Fluid needs deferred to team. Energy and protein needs based on IBW of 143lb in consideration of BMI >30 and Increased nutrient needs.

## 2025-02-26 NOTE — CHART NOTE - NSCHARTNOTEFT_GEN_A_CORE
INCOMPLETE NOTE, RECOMMENDATIONS FINAL      Recommendations:  1. Minced and Moist with Moderately Thick Liquids w/ Assistance and Supervision in interim pending MBS  2. MBS tentatively scheduled for 02/25; to r/o aspiration and to determine potential for PO upgrade.  3. Aspiration precautions! Monitor for s/s aspiration/laryngeal penetration. If noted:  D/C p.o. intake, provide non-oral nutrition/hydration/meds, and contact this service @ [phone]   4. Maintain stringent oral care.  5. d/c TBD pending hospital course  6. This service will follow.    GOAL: Pt will tolerate current recommended diet w/o clinical s/s of aspiration.    Above d/w pt, pt's daughter Miguelina via phone call, ARIANNA Muniz, and T8 Dr. Dhruv Fatima.    Vero Mao MA, CCC-SLP   Speech-Language Pathologist  office [phone]  TEAMS preferred INCOMPLETE NOTE, RECOMMENDATIONS FINAL      Recommendations:  1. Minced and Moist with Moderately Thick Liquids w/ Assistance and Supervision in interim pending MBS  2. MBS tentatively scheduled for 02/27; to r/o aspiration and to determine potential for PO upgrade.  3. Aspiration precautions! Monitor for s/s aspiration/laryngeal penetration. If noted:  D/C p.o. intake, provide non-oral nutrition/hydration/meds, and contact this service @ [phone]   4. Maintain stringent oral care.  5. d/c TBD pending hospital course  6. This service will follow.    GOAL: Pt will tolerate current recommended diet w/o clinical s/s of aspiration.    Above d/w pt, pt's daughter Miguelina via phone call, ARIANAN Muniz, and T8 Dr. Dhruv Fatima.    Vero Mao MA, CCC-SLP   Speech-Language Pathologist  office [phone]  TEAMS preferred 74yoF w/ hx of dementia, GERD, p/w acute metabolic encephalopathy iso influenza, CAP, poss UTI, and poss coffee ground emesis; CT chest w/ LLL opacity and scattered groundglass and tree-in-bud opacities.     Swallow Hx: Pt seen for x2 bedside swallow evaluations (02/24 and 02/25), w/ ongoing rx for NPO, with non-oral nutrition/hydration/medications, as pt p/w orally defensive behaviors negatively impacting participation in the feeding task. Unable to make official dietary recommendation. Per d/w pt's daughter Miguelina, pt w/ no hx of swallowing difficulty ("able to scarf down a corned beef sandwich with no problem"); has had a cough for "a while."    TODAY, pt seen for swallow re-evaluation. Per d/w RN, pt more talkative and interactive, requesting water. Pt encountered in bed, awake, A+Ox2, on RA, no longer w/ b/l mittens. Calmer and more engaged than previous encounters. Able to follow simple commands w/ models. Oral cavity significantly dry upon visualization; light brownish secretions lining upper and lower teeth, scant dried blood present where labial surfaces meet. Multiple attempts made at removing clump of dried secretions atop posterior surface of tongue; portion removed w/ diligent oral care, w/ pt requesting clinician to stop despite maximal encouragement to continue removing secretions. Pt stated that she is thirsty, denies difficulty swallowing. +baseline cough; subtle crackly breath sounds. Pt administered trials of ice chips, thin/thick puree, moderately thick liquid, mildly thick liquid, thin liquid, ice chips, and soft solids. Reduced stripping from utensil of purees given visible dislike (facial grimacing w/ applesauce and pudding). Prolonged mastication and oral transit time w/ soft solids (distractible; requiring cues to focus on feeding task). Pharyngeal swallow trigger judged to be timely, though suspected to be reduced upon palpation. Cough noted s/p mildly thick liquid and thin liquids, c/f laryngeal penetration/aspiration (most notable coughing fit s/p thin liquids w/ subsequent sneezing). Pt educated on rationale for modified diet in interim, w/ objective testing (MBS) indicated to r/o aspiration. Pt expressing dislike of thickened liquids despite rationale provided; d/w pt's daughter Miguelina, who requested further workup to receive information re: pt's current swallow function. Purposeful proactive rounding reinforced and 5 Ps addressed. Pt left in NAD.     Impressions: Pt p/w evidence of an oropharyngeal dysphagia. Diet modification indicated in interim for swallow safety; objective testing indicated to differentiate b/w deglutitive and non-deglutitive cough and to r/o aspiration. Will proceed w/ MBS given significant  improvement in pt's overall mentation and participation.     Recommendations:  1. Minced and Moist with Moderately Thick Liquids w/ Assistance and Supervision in interim pending MBS; Allow 3-5 ice chips sparingly throughout day AFTER PROVISION OF ORAL CARE.  2. MBS tentatively scheduled for 02/27  3. Aspiration precautions! Monitor for s/s aspiration/laryngeal penetration. If noted:  D/C p.o. intake, provide non-oral nutrition/hydration/meds, and contact this service @ [phone]   4. Maintain stringent oral care.  5. d/c TBD pending hospital course  6. This service will follow.    GOAL: Pt will tolerate current recommended diet w/o clinical s/s of aspiration.    Above d/w pt, pt's daughter Miguelina via phone call, ARIANNA Muniz, and T8 Dr. Dhruv Fatima.    Vero Mao MA, CCC-SLP   Speech-Language Pathologist  office [phone]  TEAMS preferred 74yoF w/ hx of dementia, GERD, p/w acute metabolic encephalopathy iso influenza, CAP, poss UTI, and poss coffee ground emesis; CT chest w/ LLL opacity and scattered groundglass and tree-in-bud opacities.     Swallow Hx: Pt seen for x2 bedside swallow evaluations (02/24 and 02/25), w/ ongoing rx for NPO, with non-oral nutrition/hydration/medications, as pt p/w orally defensive behaviors negatively impacting participation in the feeding task. Unable to make official dietary recommendation. Per d/w pt's daughter Miguelina, pt w/ no hx of swallowing difficulty ("able to scarf down a corned beef sandwich with no problem"); has had a cough for "a while."    TODAY, pt seen for swallow re-evaluation. Per d/w RN, pt more talkative and interactive, requesting water. Pt encountered in bed, awake, A+Ox2, on RA, no longer w/ b/l mittens. Calmer and more engaged than previous encounters. Able to follow simple commands w/ models. Oral cavity significantly dry upon visualization; light brownish secretions lining upper and lower teeth, scant dried blood present where labial surfaces meet. Multiple attempts made at removing clump of dried secretions atop posterior surface of tongue; portion removed w/ diligent oral care, w/ pt requesting clinician to stop despite maximal encouragement to continue removing secretions. Pt stated that she is thirsty, denies difficulty swallowing. +baseline cough; subtle crackly breath sounds. Pt administered trials of ice chips, thin/thick puree, moderately thick liquid, mildly thick liquid, thin liquid, ice chips, and soft solids. Reduced stripping from utensil of purees given visible dislike (facial grimacing w/ applesauce and pudding). Prolonged mastication and oral transit time w/ soft solids (distractible; requiring cues to focus on feeding task). Pharyngeal swallow trigger judged to be timely, though suspected to be reduced upon palpation. Cough noted s/p mildly thick liquid and thin liquids, c/f laryngeal penetration/aspiration (most notable coughing fit s/p thin liquids w/ subsequent sneezing). Pt educated on rationale for modified diet in interim, w/ objective testing (MBS) indicated to r/o aspiration. Pt expressing dislike of thickened liquids despite rationale provided; d/w pt's daughter Miguelina, who requested further workup to receive information re: pt's current swallow function. Purposeful proactive rounding reinforced and 5 Ps addressed. Pt left in NAD.     Impressions: Pt p/w evidence of an oropharyngeal dysphagia. Diet modification indicated in interim for swallow safety; objective testing indicated to differentiate b/w deglutitive and non-deglutitive cough and to r/o aspiration. Will proceed w/ MBS given significant improvement in pt's overall mentation and participation.     Recommendations:  1. Minced and Moist with Moderately Thick Liquids w/ Assistance and Supervision in interim pending MBS; Allow 3-5 ice chips sparingly throughout day WITH RN ONLY AFTER PROVISION OF ORAL CARE.  2. MBS tentatively scheduled for 02/27  3. Aspiration precautions! Monitor for s/s aspiration/laryngeal penetration. If noted:  D/C p.o. intake, provide non-oral nutrition/hydration/meds, and contact this service @ [phone]   4. Maintain stringent oral care.  5. d/c TBD pending hospital course  6. This service will follow.    GOAL: Pt will tolerate current recommended diet w/o clinical s/s of aspiration.    Above d/w pt, pt's daughter Miguelina via phone call, ARIANNA Muniz, and T8 Dr. Dhruv Fatima.    Vero Mao MA, CCC-SLP   Speech-Language Pathologist  office [phone]  TEAMS preferred

## 2025-02-26 NOTE — DIETITIAN INITIAL EVALUATION ADULT - ENERGY INTAKE
Pt has no diet order upon visit (pending Speech Language Pathologist evaluation). Just ordered for minced moist diet with moderately thick liquid per Speech Language Pathologist recommendation.

## 2025-02-26 NOTE — DIETITIAN INITIAL EVALUATION ADULT - NSFNSPHYEXAMSKINFT_GEN_A_CORE
skin per flowsheet 2/24: stage II to right buttock, suspected deep tissue injury to bilateral buttocks/sacrum  skin per wound care note 2/25:   "Sacrococcygeal/bilateral buttocks- suggesting that there may be a component of deep tissue injury.    Perineum/Bilateral posterior thighs (ischial fold)- Findings suggest that these skin injuries appear to be related to incontinence associated dermatitis and also cannot R/O component of deep tissue injury present on admission. "
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
## 2025-02-26 NOTE — DIETITIAN INITIAL EVALUATION ADULT - ETIOLOGY
increased physiological demand for nutrients  decreased ability to consume adequate protein-energy in setting of AMS

## 2025-02-26 NOTE — DIETITIAN INITIAL EVALUATION ADULT - PERTINENT LABORATORY DATA
02-26    152[H]  |  114[H]  |  26[H]  ----------------------------<  220[H]  3.5   |  22  |  1.90[H]    Ca    8.8      26 Feb 2025 07:10  Phos  2.0     02-26  Mg     2.3     02-26    TPro  8.1  /  Alb  3.5  /  TBili  0.9  /  DBili  x   /  AST  38  /  ALT  13  /  AlkPhos  91  02-26    CAPILLARY BLOOD GLUCOSE  POCT Blood Glucose.: 269 mg/dL (26 Feb 2025 12:03)  POCT Blood Glucose.: 195 mg/dL (26 Feb 2025 07:21)  POCT Blood Glucose.: 190 mg/dL (26 Feb 2025 00:21)  POCT Blood Glucose.: 175 mg/dL (25 Feb 2025 17:45)    A1C with Estimated Average Glucose Result: 6.7 % (02-26-25 @ 07:08)  A1C with Estimated Average Glucose Result: 6.7 % (02-25-25 @ 10:41)

## 2025-02-26 NOTE — DIETITIAN INITIAL EVALUATION ADULT - REASON INDICATOR FOR ASSESSMENT
Pt seen for consult for pressure injury stage 2/>. Pt alert with periods of forgetfulness upon visit. Information obtained from pt, RN, medical record. Chart reviewed, events noted.

## 2025-02-26 NOTE — DIETITIAN INITIAL EVALUATION ADULT - NSFNSGIASSESSMENTFT_GEN_A_CORE
Noted coffee ground emesis PTA per H&P, no GI distress at present, pt with UC s/p colectomy and ileostomy per chart.

## 2025-02-26 NOTE — DIETITIAN INITIAL EVALUATION ADULT - EDUCATION DIETARY MODIFICATIONS
pt with confusion and not interested with nutrition education when visit, will follow-up as able./(0) unable to meet; needs instruction

## 2025-02-26 NOTE — DIETITIAN INITIAL EVALUATION ADULT - ORAL INTAKE PTA/DIET HISTORY
Pt resides in skilled nursing facility PTA. No diet information in chart. Pt reports NKFA, states she is vegetarian (ok for dairy and egg but no meat, poultry and seafood). Unable to provide PO intake or other diet history when asked. Noted coffee ground emesis PTA per H&P.

## 2025-02-27 LAB
ALBUMIN SERPL ELPH-MCNC: 3.1 G/DL — LOW (ref 3.3–5)
ALP SERPL-CCNC: 86 U/L — SIGNIFICANT CHANGE UP (ref 40–120)
ALT FLD-CCNC: 11 U/L — SIGNIFICANT CHANGE UP (ref 10–45)
ANION GAP SERPL CALC-SCNC: 16 MMOL/L — SIGNIFICANT CHANGE UP (ref 5–17)
APPEARANCE UR: ABNORMAL
AST SERPL-CCNC: 25 U/L — SIGNIFICANT CHANGE UP (ref 10–40)
BACTERIA # UR AUTO: NEGATIVE /HPF — SIGNIFICANT CHANGE UP
BASOPHILS # BLD AUTO: 0.06 K/UL — SIGNIFICANT CHANGE UP (ref 0–0.2)
BASOPHILS NFR BLD AUTO: 0.7 % — SIGNIFICANT CHANGE UP (ref 0–2)
BILIRUB SERPL-MCNC: 0.7 MG/DL — SIGNIFICANT CHANGE UP (ref 0.2–1.2)
BILIRUB UR-MCNC: NEGATIVE — SIGNIFICANT CHANGE UP
BUN SERPL-MCNC: 29 MG/DL — HIGH (ref 7–23)
CALCIUM SERPL-MCNC: 8.6 MG/DL — SIGNIFICANT CHANGE UP (ref 8.4–10.5)
CAST: 10 /LPF — HIGH (ref 0–4)
CHLORIDE SERPL-SCNC: 110 MMOL/L — HIGH (ref 96–108)
CO2 SERPL-SCNC: 20 MMOL/L — LOW (ref 22–31)
COLOR SPEC: SIGNIFICANT CHANGE UP
CREAT ?TM UR-MCNC: 294 MG/DL — SIGNIFICANT CHANGE UP
CREAT SERPL-MCNC: 2.22 MG/DL — HIGH (ref 0.5–1.3)
DIFF PNL FLD: ABNORMAL
EGFR: 23 ML/MIN/1.73M2 — LOW
EGFR: 23 ML/MIN/1.73M2 — LOW
EOSINOPHIL # BLD AUTO: 0.36 K/UL — SIGNIFICANT CHANGE UP (ref 0–0.5)
EOSINOPHIL NFR BLD AUTO: 4.1 % — SIGNIFICANT CHANGE UP (ref 0–6)
GLUCOSE BLDC GLUCOMTR-MCNC: 184 MG/DL — HIGH (ref 70–99)
GLUCOSE BLDC GLUCOMTR-MCNC: 187 MG/DL — HIGH (ref 70–99)
GLUCOSE BLDC GLUCOMTR-MCNC: 210 MG/DL — HIGH (ref 70–99)
GLUCOSE BLDC GLUCOMTR-MCNC: 214 MG/DL — HIGH (ref 70–99)
GLUCOSE BLDC GLUCOMTR-MCNC: 226 MG/DL — HIGH (ref 70–99)
GLUCOSE SERPL-MCNC: 136 MG/DL — HIGH (ref 70–99)
GLUCOSE UR QL: NEGATIVE MG/DL — SIGNIFICANT CHANGE UP
HCT VFR BLD CALC: 36.5 % — SIGNIFICANT CHANGE UP (ref 34.5–45)
HGB BLD-MCNC: 11.3 G/DL — LOW (ref 11.5–15.5)
IMM GRANULOCYTES NFR BLD AUTO: 0.7 % — SIGNIFICANT CHANGE UP (ref 0–0.9)
KETONES UR-MCNC: ABNORMAL MG/DL
LEUKOCYTE ESTERASE UR-ACNC: ABNORMAL
LYMPHOCYTES # BLD AUTO: 1.77 K/UL — SIGNIFICANT CHANGE UP (ref 1–3.3)
LYMPHOCYTES # BLD AUTO: 20.1 % — SIGNIFICANT CHANGE UP (ref 13–44)
MAGNESIUM SERPL-MCNC: 2 MG/DL — SIGNIFICANT CHANGE UP (ref 1.6–2.6)
MCHC RBC-ENTMCNC: 30.4 PG — SIGNIFICANT CHANGE UP (ref 27–34)
MCHC RBC-ENTMCNC: 31 G/DL — LOW (ref 32–36)
MCV RBC AUTO: 98.1 FL — SIGNIFICANT CHANGE UP (ref 80–100)
MONOCYTES # BLD AUTO: 0.88 K/UL — SIGNIFICANT CHANGE UP (ref 0–0.9)
MONOCYTES NFR BLD AUTO: 10 % — SIGNIFICANT CHANGE UP (ref 2–14)
NEUTROPHILS # BLD AUTO: 5.67 K/UL — SIGNIFICANT CHANGE UP (ref 1.8–7.4)
NEUTROPHILS NFR BLD AUTO: 64.4 % — SIGNIFICANT CHANGE UP (ref 43–77)
NITRITE UR-MCNC: NEGATIVE — SIGNIFICANT CHANGE UP
NRBC BLD AUTO-RTO: 0 /100 WBCS — SIGNIFICANT CHANGE UP (ref 0–0)
OSMOLALITY UR: 640 MOS/KG — SIGNIFICANT CHANGE UP (ref 300–900)
PH UR: 5 — SIGNIFICANT CHANGE UP (ref 5–8)
PHOSPHATE SERPL-MCNC: 2.2 MG/DL — LOW (ref 2.5–4.5)
PLATELET # BLD AUTO: 332 K/UL — SIGNIFICANT CHANGE UP (ref 150–400)
POTASSIUM SERPL-MCNC: 3.5 MMOL/L — SIGNIFICANT CHANGE UP (ref 3.5–5.3)
POTASSIUM SERPL-SCNC: 3.5 MMOL/L — SIGNIFICANT CHANGE UP (ref 3.5–5.3)
POTASSIUM UR-SCNC: 70 MMOL/L — SIGNIFICANT CHANGE UP
PROT ?TM UR-MCNC: 585 MG/DL — HIGH (ref 0–12)
PROT SERPL-MCNC: 7.3 G/DL — SIGNIFICANT CHANGE UP (ref 6–8.3)
PROT UR-MCNC: 300 MG/DL
PROT/CREAT UR-RTO: 2 RATIO — HIGH (ref 0–0.2)
RBC # BLD: 3.72 M/UL — LOW (ref 3.8–5.2)
RBC # FLD: 13.8 % — SIGNIFICANT CHANGE UP (ref 10.3–14.5)
RBC CASTS # UR COMP ASSIST: 23 /HPF — HIGH (ref 0–4)
REVIEW: SIGNIFICANT CHANGE UP
S PNEUM AG UR QL: NEGATIVE — SIGNIFICANT CHANGE UP
SODIUM SERPL-SCNC: 146 MMOL/L — HIGH (ref 135–145)
SODIUM UR-SCNC: 53 MMOL/L — SIGNIFICANT CHANGE UP
SP GR SPEC: >1.03 — HIGH (ref 1–1.03)
SQUAMOUS # UR AUTO: 18 /HPF — HIGH (ref 0–5)
UROBILINOGEN FLD QL: 1 MG/DL — SIGNIFICANT CHANGE UP (ref 0.2–1)
UUN UR-MCNC: 806 MG/DL — SIGNIFICANT CHANGE UP
WBC # BLD: 8.8 K/UL — SIGNIFICANT CHANGE UP (ref 3.8–10.5)
WBC # FLD AUTO: 8.8 K/UL — SIGNIFICANT CHANGE UP (ref 3.8–10.5)
WBC UR QL: 4 /HPF — SIGNIFICANT CHANGE UP (ref 0–5)

## 2025-02-27 PROCEDURE — 74230 X-RAY XM SWLNG FUNCJ C+: CPT | Mod: 26

## 2025-02-27 PROCEDURE — 99232 SBSQ HOSP IP/OBS MODERATE 35: CPT | Mod: GC

## 2025-02-27 RX ORDER — SODIUM CHLORIDE 9 G/1000ML
500 INJECTION, SOLUTION INTRAVENOUS
Refills: 0 | Status: COMPLETED | OUTPATIENT
Start: 2025-02-27 | End: 2025-02-27

## 2025-02-27 RX ORDER — ATORVASTATIN CALCIUM 80 MG/1
40 TABLET, FILM COATED ORAL AT BEDTIME
Refills: 0 | Status: DISCONTINUED | OUTPATIENT
Start: 2025-02-27 | End: 2025-03-05

## 2025-02-27 RX ORDER — INSULIN GLARGINE-YFGN 100 [IU]/ML
4 INJECTION, SOLUTION SUBCUTANEOUS AT BEDTIME
Refills: 0 | Status: DISCONTINUED | OUTPATIENT
Start: 2025-02-27 | End: 2025-02-28

## 2025-02-27 RX ORDER — SODIUM PHOSPHATE,DIBASIC DIHYD
15 POWDER (GRAM) MISCELLANEOUS ONCE
Refills: 0 | Status: COMPLETED | OUTPATIENT
Start: 2025-02-27 | End: 2025-02-27

## 2025-02-27 RX ADMIN — GABAPENTIN 100 MILLIGRAM(S): 400 CAPSULE ORAL at 14:05

## 2025-02-27 RX ADMIN — INSULIN LISPRO 2: 100 INJECTION, SOLUTION INTRAVENOUS; SUBCUTANEOUS at 12:36

## 2025-02-27 RX ADMIN — Medication 25 GRAM(S): at 21:38

## 2025-02-27 RX ADMIN — LOSARTAN POTASSIUM 25 MILLIGRAM(S): 100 TABLET, FILM COATED ORAL at 07:00

## 2025-02-27 RX ADMIN — Medication 81 MILLIGRAM(S): at 12:37

## 2025-02-27 RX ADMIN — CLOPIDOGREL BISULFATE 75 MILLIGRAM(S): 75 TABLET, FILM COATED ORAL at 12:36

## 2025-02-27 RX ADMIN — GABAPENTIN 100 MILLIGRAM(S): 400 CAPSULE ORAL at 07:02

## 2025-02-27 RX ADMIN — ATORVASTATIN CALCIUM 40 MILLIGRAM(S): 80 TABLET, FILM COATED ORAL at 21:37

## 2025-02-27 RX ADMIN — NYSTATIN 1 APPLICATION(S): 100000 CREAM TOPICAL at 17:15

## 2025-02-27 RX ADMIN — Medication 1 TABLET(S): at 12:36

## 2025-02-27 RX ADMIN — Medication 1 CAPSULE(S): at 08:27

## 2025-02-27 RX ADMIN — METOPROLOL SUCCINATE 100 MILLIGRAM(S): 50 TABLET, EXTENDED RELEASE ORAL at 08:27

## 2025-02-27 RX ADMIN — Medication 1 CAPSULE(S): at 17:15

## 2025-02-27 RX ADMIN — INSULIN GLARGINE-YFGN 4 UNIT(S): 100 INJECTION, SOLUTION SUBCUTANEOUS at 21:37

## 2025-02-27 RX ADMIN — INSULIN LISPRO 1: 100 INJECTION, SOLUTION INTRAVENOUS; SUBCUTANEOUS at 08:26

## 2025-02-27 RX ADMIN — SODIUM CHLORIDE 2000 MILLILITER(S): 9 INJECTION, SOLUTION INTRAVENOUS at 12:35

## 2025-02-27 RX ADMIN — GABAPENTIN 100 MILLIGRAM(S): 400 CAPSULE ORAL at 21:37

## 2025-02-27 RX ADMIN — Medication 40 MILLIGRAM(S): at 17:15

## 2025-02-27 RX ADMIN — INSULIN LISPRO 1: 100 INJECTION, SOLUTION INTRAVENOUS; SUBCUTANEOUS at 00:41

## 2025-02-27 RX ADMIN — Medication 25 GRAM(S): at 14:05

## 2025-02-27 RX ADMIN — Medication 25 GRAM(S): at 06:59

## 2025-02-27 RX ADMIN — Medication 1 CAPSULE(S): at 12:36

## 2025-02-27 RX ADMIN — Medication 40 MILLIGRAM(S): at 07:00

## 2025-02-27 RX ADMIN — NYSTATIN 1 APPLICATION(S): 100000 CREAM TOPICAL at 07:03

## 2025-02-27 RX ADMIN — INSULIN LISPRO 2: 100 INJECTION, SOLUTION INTRAVENOUS; SUBCUTANEOUS at 17:14

## 2025-02-27 RX ADMIN — Medication 63.75 MILLIMOLE(S): at 10:07

## 2025-02-27 NOTE — PHYSICAL THERAPY INITIAL EVALUATION ADULT - GAIT DEVIATIONS NOTED, PT EVAL
decreased step length/decreased weight-shifting ability shuffling./decreased step length/decreased weight-shifting ability

## 2025-02-27 NOTE — PROGRESS NOTE ADULT - SUBJECTIVE AND OBJECTIVE BOX
Dhruv Radford M.D.   PGY-1 Internal Medicine  ** Note not finalized until signed by attending **   --------------------------  Dhruv Radford MD  Internal Medicine   PGY-1  --------------------------    SUBJECTIVE / OVERNIGHT EVENTS:    Pt seen in AM at bedside, resting comfortably in bed, and does not appear to be in any acute distress. When asked, pt denies any recent or active fever, chills, nausea, vomiting, headache, acute sob, chest pain, abdominal pain, genitourinary sx, extremity pain or swelling.    MEDICATIONS  (STANDING):  aspirin  chewable 81 milliGRAM(s) Oral daily  atorvastatin 40 milliGRAM(s) Oral at bedtime  clopidogrel Tablet 75 milliGRAM(s) Oral daily  dextrose 5%. 1000 milliLiter(s) (50 mL/Hr) IV Continuous <Continuous>  dextrose 5%. 1000 milliLiter(s) (100 mL/Hr) IV Continuous <Continuous>  dextrose 50% Injectable 25 Gram(s) IV Push once  dextrose 50% Injectable 12.5 Gram(s) IV Push once  dextrose 50% Injectable 25 Gram(s) IV Push once  gabapentin 100 milliGRAM(s) Oral three times a day  glucagon  Injectable 1 milliGRAM(s) IntraMuscular once  insulin glargine Injectable (LANTUS) 4 Unit(s) SubCutaneous at bedtime  insulin lispro (ADMELOG) corrective regimen sliding scale   SubCutaneous every 6 hours  losartan 25 milliGRAM(s) Oral daily  metoprolol succinate  milliGRAM(s) Oral daily  Nephro-deep 1 Tablet(s) Oral daily  nystatin Powder 1 Application(s) Topical two times a day  pancrelipase  (CREON  6,000 Lipase Units) 1 Capsule(s) Oral three times a day with meals  pantoprazole    Tablet 40 milliGRAM(s) Oral two times a day  piperacillin/tazobactam IVPB.. 3.375 Gram(s) IV Intermittent every 8 hours  sodium chloride 0.45%. 500 milliLiter(s) (2000 mL/Hr) IV Continuous <Continuous>    MEDICATIONS  (PRN):  calamine/zinc oxide Lotion 1 Application(s) Topical two times a day PRN Rash and/or Itching  dextrose Oral Gel 15 Gram(s) Oral once PRN Blood Glucose LESS THAN 70 milliGRAM(s)/deciliter    CAPILLARY BLOOD GLUCOSE    POCT Blood Glucose.: 226 mg/dL (27 Feb 2025 12:25)  POCT Blood Glucose.: 187 mg/dL (27 Feb 2025 08:14)  POCT Blood Glucose.: 184 mg/dL (27 Feb 2025 00:29)  POCT Blood Glucose.: 179 mg/dL (26 Feb 2025 21:48)  POCT Blood Glucose.: 209 mg/dL (26 Feb 2025 17:51)    I&O's Summary    26 Feb 2025 07:01  -  27 Feb 2025 07:00  --------------------------------------------------------  IN: 480 mL / OUT: 150 mL / NET: 330 mL    PHYSICAL EXAM:  Vital Signs Last 24 Hrs  T(C): 36.4 (27 Feb 2025 12:28), Max: 36.6 (26 Feb 2025 12:52)  T(F): 97.5 (27 Feb 2025 12:28), Max: 97.8 (26 Feb 2025 12:52)  HR: 68 (27 Feb 2025 12:28) (57 - 68)  BP: 161/81 (27 Feb 2025 12:28) (141/61 - 189/74)  RR: 18 (27 Feb 2025 12:28) (18 - 18)  SpO2: 97% (27 Feb 2025 12:28) (93% - 97%)    Parameters below as of 27 Feb 2025 12:28  Patient On (Oxygen Delivery Method): room air    CONSTITUTIONAL: NAD; well-developed  HEENT: PERRL, clear conjunctiva  RESPIRATORY: Normal respiratory effort; lungs are clear to auscultation bilaterally; No Crackles/Rhonchi/Wheezing  CARDIOVASCULAR: Regular rate and rhythm, normal S1 and S2, no murmur/rub/gallop; No lower extremity edema; Peripheral pulses are 2+ bilaterally  ABDOMEN: Nontender to palpation, normoactive bowel sounds, no rebound/guarding; No hepatosplenomegaly  MUSCULOSKELETAL: no clubbing or cyanosis of digits; no joint swelling or tenderness to palpation  EXTREMITY: Improving L hand/L wrist/L forearm bruising. Tender to palpation.   NEURO: A&Ox3; verbal. Conversive.  PSYCH: normal mood; Affect appropirate    LABS:                        11.3   8.80  )-----------( 332      ( 27 Feb 2025 07:08 )             36.5     02-27    146[H]  |  110[H]  |  29[H]  ----------------------------<  136[H]  3.5   |  20[L]  |  2.22[H]    Ca    8.6      27 Feb 2025 07:06  Phos  2.2     02-27  Mg     2.0     02-27    TPro  7.3  /  Alb  3.1[L]  /  TBili  0.7  /  DBili  x   /  AST  25  /  ALT  11  /  AlkPhos  86  02-27    Urinalysis Basic - ( 27 Feb 2025 08:43 )  Color: Dark Yellow / Appearance: Turbid / SG: >1.030 / pH: x  Gluc: x / Ketone: Trace mg/dL  / Bili: Negative / Urobili: 1.0 mg/dL   Blood: x / Protein: 300 mg/dL / Nitrite: Negative   Leuk Esterase: Trace / RBC: 23 /HPF / WBC 4 /HPF   Sq Epi: x / Non Sq Epi: 18 /HPF / Bacteria: Negative /HPF    RADIOLOGY & ADDITIONAL TESTS:  Results Reviewed: X  Imaging Personally Reviewed: X  Electrocardiogram Personally Reviewed: X

## 2025-02-27 NOTE — SWALLOW VFSS/MBS ASSESSMENT ADULT - ORAL PHASE
Mildly delayed A-P transit Passive bolus spillover to the valleculae and pyriform sinuses during oral transit./Delayed oral transit time Mod spillover to valleculae, w/ trace to mild passive spillover to the pyriform sinuses./Incomplete tongue to palate contact Mod-max spillover to the valleculae prior to swallow trigger./Incomplete tongue to palate contact Mildly delayed oral transit time; Mod-max spillover to the valleculae.

## 2025-02-27 NOTE — SWALLOW VFSS/MBS ASSESSMENT ADULT - NS SWALLOW VFSS REC ASPIR MON
Of note, cough is NOT an indicator of aspiration, as cough occurred at baseline and occasionally during exam in the absence of laryngeal penetration/aspiration./change of breathing pattern/gurgly voice/fever/pneumonia/throat clearing/upper respiratory infection

## 2025-02-27 NOTE — SWALLOW VFSS/MBS ASSESSMENT ADULT - SLP PERTINENT HISTORY OF CURRENT PROBLEM
74yoF w/ hx dementia, HTN, DM2, UC s/p colectomy and ileostomy, Afib, CAD, ?CHF, GERD, pancreatic insufficiency, migraines, anxiety, venous insufficiency, CKD (baseline Cr ~1.8), recent influenza diagnosed 6 days ago, pw acute metabolic encephalopathy in setting of influenza, CAP, poss UTI, and poss coffee ground emesis. Will start protonix 40 IV BID, may need GI consult once history is clarified[facility]. Empiric azithro for CAP, zosyn for poss aspiration.

## 2025-02-27 NOTE — PHYSICAL THERAPY INITIAL EVALUATION ADULT - PERTINENT HX OF CURRENT PROBLEM, REHAB EVAL
74-year-old female with a history of dementia, T2DM, CHF, HLD, A-fib, CAD, HTN, GERD, CKD presenting with altered mental status, hypoxia from nursing facility.  Per daughter at bedside he was diagnosed with flu approximately 6 days ago.  Earlier today she was at her nursing home and was noted to be hypoxic to 93% and sent to the ED for further evaluation.  Daughter also notes that she was significantly more altered than her baseline. Influenza A. CAP (community acquired pneumonia).  X ray L Wrist- No fractures or dislocations.
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
## 2025-02-27 NOTE — SWALLOW VFSS/MBS ASSESSMENT ADULT - ADDITIONAL RECOMMENDATIONS
Maintain good oral hygiene.    Swallow: 1. Pt/family/caregiver will demonstrate understanding and carryover of dysphagia management (safe swallow guidelines, compensatory strategies, dysphagia diet).  2. Pt will tolerate recommended diet with no overt, clinical s/s of aspiration.

## 2025-02-27 NOTE — SWALLOW VFSS/MBS ASSESSMENT ADULT - SLP GENERAL OBSERVATIONS
Pt encountered in radiology, secure in TOLU chair. Pt awake and alert, cooperative, following simple directions for the purposes of the exam, on RA, pleasantly confused, requesting cold water.

## 2025-02-27 NOTE — SWALLOW VFSS/MBS ASSESSMENT ADULT - DIAGNOSTIC IMPRESSIONS
74yoF w/ hx of dementia, GERD, p/w acute metabolic encephalopathy iso influenza, CAP, UTI, and coffee ground emesis; CT chest w/ LLL opacity and scattered groundglass and tree-in-bud opacities. Pt p/w a mild oropharyngeal dysphagia vs presbyphagia, though functional for a regular diet. Swallow sequence notable for prolonged but adequate mastication of regular solids likely iso incomplete dentition, mildly delayed A-P transit, intermittent premature spillover of liquids, and trace-mild shallow laryngeal penetration of thin liquids via straw during the swallow. Trace amount remains, which appears to clear w/ spontaneous delayed cough response. No evidence of airway invasion w/ thin liquid via cup sip. No aspiration observed on this exam. Of note, pt w/ baseline cough which occasionally resurfaces during exam IN THE ABSENCE OF laryngeal penetration/aspiration. Service to follow to monitor tolerance of recommended diet.     Disorders: mildly reduced tongue to palate contact, intermittent mild delay in trigger of the swallow reflex, mildly reduced laryngeal closure, mildly reduced supraglottic sensation.

## 2025-02-27 NOTE — SWALLOW VFSS/MBS ASSESSMENT ADULT - DELAYED INITIATION OF THE PHARYNGEAL SWALLOW (IN SECONDS)
Swallow trigger occurs at the superior laryngeal surface of the epiglottis. Swallow trigger occurs at the valleculae/superior laryngeal surface of the epiglottis. Swallow trigger occurs as bolus head reaches level of the pyriform sinuses.

## 2025-02-27 NOTE — ADVANCED PRACTICE NURSE CONSULT - ASSESSMENT
Chart reviewed and events noted.  In at the bedside to see patient for a routine follow up for pouching issues on admission.  Patient encountered on contact and droplet precautions for influenza A.  Patient alert and awake.  Introduced self, role and the reason for the visit and patient consented for ostomy RN to proceed with the pouching assessment and complete change.  On assessment:   Pouching system in place with pouch seal intact.  Ostomy functioning with +ve flatus and yellow liquid to pasty output.      Complete pouching system changed.  Removed used ostomy pouching system.  Stoma 1 "L x 1 1/2"  pink an viable.  Stoma flushed at the skin level.   Deep creases at  3 and 9 o'clock.  Peristomal skin slightly denuded and mucocutaneous junction intact.  Skin wiped clean with wet warm guaze.  Pat dried thoroughly.  Dusted the skin with sprinkle of stoma powder to denuded skin to absorb moisture.  Followed by dabbing of Cavilon 3M liquid skin barrier to seal and protect skin.  Stoma paste applied to the back of the opening of the skin barrier to caulk and to deep creases to level the surface.  Skin barrier applied over the stoma confirming appropriate adhesion.  Marianna drainable pouch attached to the flange of the skin barrier confirming appropriate seal.   Extra supplies along with the pattern left at the bedside for further changes.   Plan of care discussed with the covering RN.  Emotional support and encouragement provided through out the visit.  Safety maintained.  Will continue to monitor.

## 2025-02-27 NOTE — SWALLOW VFSS/MBS ASSESSMENT ADULT - ROSENBEK'S PENETRATION ASPIRATION SCALE
(1) no aspiration, contrast does not enter airway PAS 1 for single cup sips/(3) contrast remains above the vocal cords, visible residue remains (penetration)

## 2025-02-27 NOTE — SWALLOW VFSS/MBS ASSESSMENT ADULT - LARYNGEAL PENETRATION DURING THE SWALLOW - SILENT
Trace-Mild; Shallow; Over the laryngeal surface of the epiglottis; During straw sips; Trace amount remains in the laryngeal vestibule. Material eventually clears w/ spontaneous delayed cough response.

## 2025-02-27 NOTE — PHYSICAL THERAPY INITIAL EVALUATION ADULT - RANGE OF MOTION EXAMINATION, REHAB EVAL
R shoulder limited ROM./Left UE ROM was WFL (within functional limits)/bilateral lower extremity ROM was WFL (within functional limits)/deficits as listed below

## 2025-02-27 NOTE — SWALLOW VFSS/MBS ASSESSMENT ADULT - ORAL PHASE COMMENTS
Trace-mild oral cavity residue clears with spontaneous repeat swallows. Trace-mild oral cavity residue clears w/ spontaneous repeat swallows. Trace-mild oral cavity residue clears w/ spontaneous repeat swallow. Trace oral cavity residue clears with repeat swallows/thin liquid wash.

## 2025-02-27 NOTE — SWALLOW VFSS/MBS ASSESSMENT ADULT - ASPIRATION PRECAUTIONS
Monitor for s/s aspiration/laryngeal penetration. If noted:  D/C p.o. intake, provide non-oral nutrition/hydration/meds, and contact this service @ [phone]

## 2025-02-27 NOTE — SWALLOW VFSS/MBS ASSESSMENT ADULT - COMMENTS
Per follow-up chart note--> son at bedside reports patient is at baseline alert, oriented to place and people, interactive. He noticed patient has been declining since  when she developed cough, diagnosed with FLU and PNA. No prior issue with swallowing. follow up S&S eval. no reported vomiting while in ED but per daughter, Miguelina, patient reportedly had one episode of vomiting at NH which prompted them to send the patient the ED.     Imagin/23: CXR: Low left lung volumes with probable effusion and atelectasis.  : CT Chest: Confluent consolidative opacity in the left lower lobe, cannot exclude left basilar pneumonia. Additional scattered groundglass and tree-in-bud opacities bilaterally. No bowel obstruction. Cholelithiasis.  : CTH: Visualized portions demonstrate no acute intracranial hemorrhage, mass effect, or midline shift.    Swallow Hx: Pt seen by this service  and , w/ rx for NPO, with non-oral nutrition/hydration/medications. : Rx for minced and moist and moderately thick liquid, w/ MBS scheduled for  to r/o aspiration given chest CT findings.

## 2025-02-28 LAB
ALBUMIN SERPL ELPH-MCNC: 3 G/DL — LOW (ref 3.3–5)
ALP SERPL-CCNC: 87 U/L — SIGNIFICANT CHANGE UP (ref 40–120)
ALT FLD-CCNC: 9 U/L — LOW (ref 10–45)
ANION GAP SERPL CALC-SCNC: 14 MMOL/L — SIGNIFICANT CHANGE UP (ref 5–17)
AST SERPL-CCNC: 15 U/L — SIGNIFICANT CHANGE UP (ref 10–40)
BASOPHILS # BLD AUTO: 0.07 K/UL — SIGNIFICANT CHANGE UP (ref 0–0.2)
BASOPHILS NFR BLD AUTO: 0.8 % — SIGNIFICANT CHANGE UP (ref 0–2)
BILIRUB SERPL-MCNC: 0.4 MG/DL — SIGNIFICANT CHANGE UP (ref 0.2–1.2)
BUN SERPL-MCNC: 43 MG/DL — HIGH (ref 7–23)
CALCIUM SERPL-MCNC: 8.5 MG/DL — SIGNIFICANT CHANGE UP (ref 8.4–10.5)
CHLORIDE SERPL-SCNC: 111 MMOL/L — HIGH (ref 96–108)
CO2 SERPL-SCNC: 18 MMOL/L — LOW (ref 22–31)
CREAT SERPL-MCNC: 3.07 MG/DL — HIGH (ref 0.5–1.3)
CULTURE RESULTS: SIGNIFICANT CHANGE UP
CULTURE RESULTS: SIGNIFICANT CHANGE UP
EGFR: 15 ML/MIN/1.73M2 — LOW
EGFR: 15 ML/MIN/1.73M2 — LOW
EOSINOPHIL # BLD AUTO: 0.46 K/UL — SIGNIFICANT CHANGE UP (ref 0–0.5)
EOSINOPHIL NFR BLD AUTO: 5.1 % — SIGNIFICANT CHANGE UP (ref 0–6)
GLUCOSE BLDC GLUCOMTR-MCNC: 164 MG/DL — HIGH (ref 70–99)
GLUCOSE BLDC GLUCOMTR-MCNC: 194 MG/DL — HIGH (ref 70–99)
GLUCOSE BLDC GLUCOMTR-MCNC: 251 MG/DL — HIGH (ref 70–99)
GLUCOSE BLDC GLUCOMTR-MCNC: 256 MG/DL — HIGH (ref 70–99)
GLUCOSE SERPL-MCNC: 174 MG/DL — HIGH (ref 70–99)
HCT VFR BLD CALC: 34.9 % — SIGNIFICANT CHANGE UP (ref 34.5–45)
HGB BLD-MCNC: 10.7 G/DL — LOW (ref 11.5–15.5)
IMM GRANULOCYTES NFR BLD AUTO: 0.9 % — SIGNIFICANT CHANGE UP (ref 0–0.9)
LYMPHOCYTES # BLD AUTO: 2.03 K/UL — SIGNIFICANT CHANGE UP (ref 1–3.3)
LYMPHOCYTES # BLD AUTO: 22.7 % — SIGNIFICANT CHANGE UP (ref 13–44)
MAGNESIUM SERPL-MCNC: 2.1 MG/DL — SIGNIFICANT CHANGE UP (ref 1.6–2.6)
MCHC RBC-ENTMCNC: 29.8 PG — SIGNIFICANT CHANGE UP (ref 27–34)
MCHC RBC-ENTMCNC: 30.7 G/DL — LOW (ref 32–36)
MCV RBC AUTO: 97.2 FL — SIGNIFICANT CHANGE UP (ref 80–100)
MONOCYTES # BLD AUTO: 0.93 K/UL — HIGH (ref 0–0.9)
MONOCYTES NFR BLD AUTO: 10.4 % — SIGNIFICANT CHANGE UP (ref 2–14)
NEUTROPHILS # BLD AUTO: 5.37 K/UL — SIGNIFICANT CHANGE UP (ref 1.8–7.4)
NEUTROPHILS NFR BLD AUTO: 60.1 % — SIGNIFICANT CHANGE UP (ref 43–77)
NRBC BLD AUTO-RTO: 0 /100 WBCS — SIGNIFICANT CHANGE UP (ref 0–0)
PHOSPHATE SERPL-MCNC: 2.6 MG/DL — SIGNIFICANT CHANGE UP (ref 2.5–4.5)
PLATELET # BLD AUTO: 301 K/UL — SIGNIFICANT CHANGE UP (ref 150–400)
POTASSIUM SERPL-MCNC: 4.3 MMOL/L — SIGNIFICANT CHANGE UP (ref 3.5–5.3)
POTASSIUM SERPL-SCNC: 4.3 MMOL/L — SIGNIFICANT CHANGE UP (ref 3.5–5.3)
PROT SERPL-MCNC: 6.8 G/DL — SIGNIFICANT CHANGE UP (ref 6–8.3)
RBC # BLD: 3.59 M/UL — LOW (ref 3.8–5.2)
RBC # FLD: 13.7 % — SIGNIFICANT CHANGE UP (ref 10.3–14.5)
SODIUM SERPL-SCNC: 143 MMOL/L — SIGNIFICANT CHANGE UP (ref 135–145)
SPECIMEN SOURCE: SIGNIFICANT CHANGE UP
SPECIMEN SOURCE: SIGNIFICANT CHANGE UP
WBC # BLD: 8.94 K/UL — SIGNIFICANT CHANGE UP (ref 3.8–10.5)
WBC # FLD AUTO: 8.94 K/UL — SIGNIFICANT CHANGE UP (ref 3.8–10.5)

## 2025-02-28 PROCEDURE — 93970 EXTREMITY STUDY: CPT | Mod: 26

## 2025-02-28 PROCEDURE — 99232 SBSQ HOSP IP/OBS MODERATE 35: CPT | Mod: GC

## 2025-02-28 RX ORDER — INSULIN LISPRO 100 U/ML
INJECTION, SOLUTION INTRAVENOUS; SUBCUTANEOUS AT BEDTIME
Refills: 0 | Status: DISCONTINUED | OUTPATIENT
Start: 2025-02-28 | End: 2025-03-05

## 2025-02-28 RX ORDER — INSULIN LISPRO 100 U/ML
INJECTION, SOLUTION INTRAVENOUS; SUBCUTANEOUS AT BEDTIME
Refills: 0 | Status: DISCONTINUED | OUTPATIENT
Start: 2025-02-28 | End: 2025-02-28

## 2025-02-28 RX ORDER — AMLODIPINE BESYLATE 10 MG/1
5 TABLET ORAL DAILY
Refills: 0 | Status: DISCONTINUED | OUTPATIENT
Start: 2025-02-28 | End: 2025-03-03

## 2025-02-28 RX ORDER — INSULIN GLARGINE-YFGN 100 [IU]/ML
6 INJECTION, SOLUTION SUBCUTANEOUS AT BEDTIME
Refills: 0 | Status: DISCONTINUED | OUTPATIENT
Start: 2025-02-28 | End: 2025-03-01

## 2025-02-28 RX ORDER — INSULIN LISPRO 100 U/ML
INJECTION, SOLUTION INTRAVENOUS; SUBCUTANEOUS
Refills: 0 | Status: DISCONTINUED | OUTPATIENT
Start: 2025-02-28 | End: 2025-03-05

## 2025-02-28 RX ORDER — INSULIN LISPRO 100 U/ML
INJECTION, SOLUTION INTRAVENOUS; SUBCUTANEOUS
Refills: 0 | Status: DISCONTINUED | OUTPATIENT
Start: 2025-02-28 | End: 2025-02-28

## 2025-02-28 RX ORDER — HEPARIN SODIUM 1000 [USP'U]/ML
5000 INJECTION INTRAVENOUS; SUBCUTANEOUS EVERY 12 HOURS
Refills: 0 | Status: DISCONTINUED | OUTPATIENT
Start: 2025-02-28 | End: 2025-03-03

## 2025-02-28 RX ORDER — PIPERACILLIN-TAZO-DEXTROSE,ISO 3.375G/5
3.38 IV SOLUTION, PIGGYBACK PREMIX FROZEN(ML) INTRAVENOUS EVERY 12 HOURS
Refills: 0 | Status: DISCONTINUED | OUTPATIENT
Start: 2025-02-28 | End: 2025-03-01

## 2025-02-28 RX ADMIN — Medication 75 MILLILITER(S): at 13:37

## 2025-02-28 RX ADMIN — Medication 40 MILLIGRAM(S): at 17:06

## 2025-02-28 RX ADMIN — METOPROLOL SUCCINATE 100 MILLIGRAM(S): 50 TABLET, EXTENDED RELEASE ORAL at 05:37

## 2025-02-28 RX ADMIN — INSULIN LISPRO 6: 100 INJECTION, SOLUTION INTRAVENOUS; SUBCUTANEOUS at 17:06

## 2025-02-28 RX ADMIN — Medication 1 CAPSULE(S): at 17:06

## 2025-02-28 RX ADMIN — HEPARIN SODIUM 5000 UNIT(S): 1000 INJECTION INTRAVENOUS; SUBCUTANEOUS at 17:06

## 2025-02-28 RX ADMIN — Medication 1 CAPSULE(S): at 08:27

## 2025-02-28 RX ADMIN — Medication 1 CAPSULE(S): at 13:37

## 2025-02-28 RX ADMIN — AMLODIPINE BESYLATE 5 MILLIGRAM(S): 10 TABLET ORAL at 13:41

## 2025-02-28 RX ADMIN — GABAPENTIN 100 MILLIGRAM(S): 400 CAPSULE ORAL at 21:30

## 2025-02-28 RX ADMIN — ATORVASTATIN CALCIUM 40 MILLIGRAM(S): 80 TABLET, FILM COATED ORAL at 21:30

## 2025-02-28 RX ADMIN — Medication 81 MILLIGRAM(S): at 13:36

## 2025-02-28 RX ADMIN — GABAPENTIN 100 MILLIGRAM(S): 400 CAPSULE ORAL at 13:36

## 2025-02-28 RX ADMIN — LOSARTAN POTASSIUM 25 MILLIGRAM(S): 100 TABLET, FILM COATED ORAL at 05:37

## 2025-02-28 RX ADMIN — NYSTATIN 1 APPLICATION(S): 100000 CREAM TOPICAL at 17:11

## 2025-02-28 RX ADMIN — NYSTATIN 1 APPLICATION(S): 100000 CREAM TOPICAL at 05:38

## 2025-02-28 RX ADMIN — Medication 1 TABLET(S): at 13:37

## 2025-02-28 RX ADMIN — Medication 25 GRAM(S): at 05:37

## 2025-02-28 RX ADMIN — INSULIN GLARGINE-YFGN 6 UNIT(S): 100 INJECTION, SOLUTION SUBCUTANEOUS at 21:30

## 2025-02-28 RX ADMIN — GABAPENTIN 100 MILLIGRAM(S): 400 CAPSULE ORAL at 05:37

## 2025-02-28 RX ADMIN — Medication 25 GRAM(S): at 17:06

## 2025-02-28 RX ADMIN — Medication 40 MILLIGRAM(S): at 05:37

## 2025-02-28 RX ADMIN — CLOPIDOGREL BISULFATE 75 MILLIGRAM(S): 75 TABLET, FILM COATED ORAL at 13:36
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
## 2025-02-28 NOTE — PROGRESS NOTE ADULT - SUBJECTIVE AND OBJECTIVE BOX
Dhruv Radford M.D.   PGY-1 Internal Medicine  ** Note not finalized until signed by attending **   --------------------------  Dhruv Radford MD  Internal Medicine   PGY-1  --------------------------    SUBJECTIVE / OVERNIGHT EVENTS:    Pt seen in AM at bedside, resting comfortably in bed, and does not appear to be in any acute distress. When asked, pt denies any recent or active fever, chills, nausea, vomiting, headache, acute sob, chest pain, abdominal pain, genitourinary sx, extremity pain or swelling.    MEDICATIONS  (STANDING):  amLODIPine   Tablet 5 milliGRAM(s) Oral daily  aspirin  chewable 81 milliGRAM(s) Oral daily  atorvastatin 40 milliGRAM(s) Oral at bedtime  clopidogrel Tablet 75 milliGRAM(s) Oral daily  dextrose 5%. 1000 milliLiter(s) (50 mL/Hr) IV Continuous <Continuous>  dextrose 5%. 1000 milliLiter(s) (100 mL/Hr) IV Continuous <Continuous>  dextrose 50% Injectable 25 Gram(s) IV Push once  dextrose 50% Injectable 12.5 Gram(s) IV Push once  dextrose 50% Injectable 25 Gram(s) IV Push once  gabapentin 100 milliGRAM(s) Oral three times a day  glucagon  Injectable 1 milliGRAM(s) IntraMuscular once  heparin   Injectable 5000 Unit(s) SubCutaneous every 12 hours  insulin glargine Injectable (LANTUS) 6 Unit(s) SubCutaneous at bedtime  insulin lispro (ADMELOG) corrective regimen sliding scale   SubCutaneous three times a day before meals  insulin lispro (ADMELOG) corrective regimen sliding scale   SubCutaneous at bedtime  metoprolol succinate  milliGRAM(s) Oral daily  Nephro-deep 1 Tablet(s) Oral daily  nystatin Powder 1 Application(s) Topical two times a day  pancrelipase  (CREON  6,000 Lipase Units) 1 Capsule(s) Oral three times a day with meals  pantoprazole    Tablet 40 milliGRAM(s) Oral two times a day  piperacillin/tazobactam IVPB.. 3.375 Gram(s) IV Intermittent every 12 hours  sodium chloride 0.9%. 1000 milliLiter(s) (75 mL/Hr) IV Continuous <Continuous>    MEDICATIONS  (PRN):  calamine/zinc oxide Lotion 1 Application(s) Topical two times a day PRN Rash and/or Itching  dextrose Oral Gel 15 Gram(s) Oral once PRN Blood Glucose LESS THAN 70 milliGRAM(s)/deciliter    CAPILLARY BLOOD GLUCOSE    POCT Blood Glucose.: 256 mg/dL (28 Feb 2025 12:47)  POCT Blood Glucose.: 194 mg/dL (28 Feb 2025 08:07)  POCT Blood Glucose.: 214 mg/dL (27 Feb 2025 21:19)  POCT Blood Glucose.: 210 mg/dL (27 Feb 2025 16:56)    I&O's Summary    27 Feb 2025 07:01  -  28 Feb 2025 07:00  --------------------------------------------------------  IN: 0 mL / OUT: 200 mL / NET: -200 mL    28 Feb 2025 07:01  -  28 Feb 2025 14:06  --------------------------------------------------------  IN: 480 mL / OUT: 0 mL / NET: 480 mL    PHYSICAL EXAM:  Vital Signs Last 24 Hrs  T(C): 36.7 (28 Feb 2025 13:43), Max: 36.8 (27 Feb 2025 16:30)  T(F): 98 (28 Feb 2025 13:43), Max: 98.2 (27 Feb 2025 16:30)  HR: 89 (28 Feb 2025 13:43) (58 - 97)  BP: 168/81 (28 Feb 2025 13:43) (126/77 - 173/74)  RR: 18 (28 Feb 2025 13:43) (18 - 18)  SpO2: 95% (28 Feb 2025 13:43) (93% - 96%)    Parameters below as of 28 Feb 2025 13:43  Patient On (Oxygen Delivery Method): room air    CONSTITUTIONAL: NAD; well-developed  HEENT: PERRL, clear conjunctiva  RESPIRATORY: Normal respiratory effort; lungs are clear to auscultation bilaterally; No Crackles/Rhonchi/Wheezing  CARDIOVASCULAR: Regular rate and rhythm, normal S1 and S2, no murmur/rub/gallop; No lower extremity edema; Peripheral pulses are 2+ bilaterally  ABDOMEN: Nontender to palpation, normoactive bowel sounds, no rebound/guarding; No hepatosplenomegaly  MUSCULOSKELETAL: no clubbing or cyanosis of digits; no joint swelling or tenderness to palpation  EXTREMITY: Improving L hand/L wrist/L forearm bruising. Tender to palpation.   NEURO: A&Ox3; verbal. Conversive.  PSYCH: normal mood; Affect appropirate    LABS:                        10.7   8.94  )-----------( 301      ( 28 Feb 2025 07:17 )             34.9     02-28    143  |  111[H]  |  43[H]  ----------------------------<  174[H]  4.3   |  18[L]  |  3.07[H]    Ca    8.5      28 Feb 2025 07:17  Phos  2.6     02-28  Mg     2.1     02-28    TPro  6.8  /  Alb  3.0[L]  /  TBili  0.4  /  DBili  x   /  AST  15  /  ALT  9[L]  /  AlkPhos  87  02-28    Urinalysis Basic - ( 28 Feb 2025 07:17 )  Color: x / Appearance: x / SG: x / pH: x  Gluc: 174 mg/dL / Ketone: x  / Bili: x / Urobili: x   Blood: x / Protein: x / Nitrite: x   Leuk Esterase: x / RBC: x / WBC x   Sq Epi: x / Non Sq Epi: x / Bacteria: x    RADIOLOGY & ADDITIONAL TESTS:  Results Reviewed: X  Imaging Personally Reviewed: X  Electrocardiogram Personally Reviewed: X

## 2025-03-01 DIAGNOSIS — I82.409 ACUTE EMBOLISM AND THROMBOSIS OF UNSPECIFIED DEEP VEINS OF UNSPECIFIED LOWER EXTREMITY: ICD-10-CM

## 2025-03-01 LAB
ALBUMIN SERPL ELPH-MCNC: 3.1 G/DL — LOW (ref 3.3–5)
ALP SERPL-CCNC: 97 U/L — SIGNIFICANT CHANGE UP (ref 40–120)
ALT FLD-CCNC: 9 U/L — LOW (ref 10–45)
ANION GAP SERPL CALC-SCNC: 14 MMOL/L — SIGNIFICANT CHANGE UP (ref 5–17)
AST SERPL-CCNC: 13 U/L — SIGNIFICANT CHANGE UP (ref 10–40)
BASOPHILS # BLD AUTO: 0.09 K/UL — SIGNIFICANT CHANGE UP (ref 0–0.2)
BASOPHILS NFR BLD AUTO: 1.2 % — SIGNIFICANT CHANGE UP (ref 0–2)
BILIRUB SERPL-MCNC: 0.3 MG/DL — SIGNIFICANT CHANGE UP (ref 0.2–1.2)
BUN SERPL-MCNC: 44 MG/DL — HIGH (ref 7–23)
CALCIUM SERPL-MCNC: 8.4 MG/DL — SIGNIFICANT CHANGE UP (ref 8.4–10.5)
CHLORIDE SERPL-SCNC: 114 MMOL/L — HIGH (ref 96–108)
CO2 SERPL-SCNC: 17 MMOL/L — LOW (ref 22–31)
CREAT SERPL-MCNC: 3.1 MG/DL — HIGH (ref 0.5–1.3)
EGFR: 15 ML/MIN/1.73M2 — LOW
EGFR: 15 ML/MIN/1.73M2 — LOW
EOSINOPHIL # BLD AUTO: 0.44 K/UL — SIGNIFICANT CHANGE UP (ref 0–0.5)
EOSINOPHIL NFR BLD AUTO: 6 % — SIGNIFICANT CHANGE UP (ref 0–6)
GLUCOSE BLDC GLUCOMTR-MCNC: 198 MG/DL — HIGH (ref 70–99)
GLUCOSE BLDC GLUCOMTR-MCNC: 211 MG/DL — HIGH (ref 70–99)
GLUCOSE BLDC GLUCOMTR-MCNC: 241 MG/DL — HIGH (ref 70–99)
GLUCOSE BLDC GLUCOMTR-MCNC: 285 MG/DL — HIGH (ref 70–99)
GLUCOSE BLDC GLUCOMTR-MCNC: 353 MG/DL — HIGH (ref 70–99)
GLUCOSE SERPL-MCNC: 154 MG/DL — HIGH (ref 70–99)
HCT VFR BLD CALC: 37.1 % — SIGNIFICANT CHANGE UP (ref 34.5–45)
HGB BLD-MCNC: 11.4 G/DL — LOW (ref 11.5–15.5)
IMM GRANULOCYTES NFR BLD AUTO: 0.8 % — SIGNIFICANT CHANGE UP (ref 0–0.9)
LYMPHOCYTES # BLD AUTO: 1.55 K/UL — SIGNIFICANT CHANGE UP (ref 1–3.3)
LYMPHOCYTES # BLD AUTO: 21 % — SIGNIFICANT CHANGE UP (ref 13–44)
MAGNESIUM SERPL-MCNC: 2 MG/DL — SIGNIFICANT CHANGE UP (ref 1.6–2.6)
MCHC RBC-ENTMCNC: 30 PG — SIGNIFICANT CHANGE UP (ref 27–34)
MCHC RBC-ENTMCNC: 30.7 G/DL — LOW (ref 32–36)
MCV RBC AUTO: 97.6 FL — SIGNIFICANT CHANGE UP (ref 80–100)
MONOCYTES # BLD AUTO: 0.64 K/UL — SIGNIFICANT CHANGE UP (ref 0–0.9)
MONOCYTES NFR BLD AUTO: 8.7 % — SIGNIFICANT CHANGE UP (ref 2–14)
NEUTROPHILS # BLD AUTO: 4.61 K/UL — SIGNIFICANT CHANGE UP (ref 1.8–7.4)
NEUTROPHILS NFR BLD AUTO: 62.3 % — SIGNIFICANT CHANGE UP (ref 43–77)
NRBC BLD AUTO-RTO: 0 /100 WBCS — SIGNIFICANT CHANGE UP (ref 0–0)
PHOSPHATE SERPL-MCNC: 2.5 MG/DL — SIGNIFICANT CHANGE UP (ref 2.5–4.5)
PLATELET # BLD AUTO: 298 K/UL — SIGNIFICANT CHANGE UP (ref 150–400)
POTASSIUM SERPL-MCNC: 5 MMOL/L — SIGNIFICANT CHANGE UP (ref 3.5–5.3)
POTASSIUM SERPL-SCNC: 5 MMOL/L — SIGNIFICANT CHANGE UP (ref 3.5–5.3)
PROT SERPL-MCNC: 7.1 G/DL — SIGNIFICANT CHANGE UP (ref 6–8.3)
RBC # BLD: 3.8 M/UL — SIGNIFICANT CHANGE UP (ref 3.8–5.2)
RBC # FLD: 13.7 % — SIGNIFICANT CHANGE UP (ref 10.3–14.5)
SODIUM SERPL-SCNC: 145 MMOL/L — SIGNIFICANT CHANGE UP (ref 135–145)
WBC # BLD: 7.39 K/UL — SIGNIFICANT CHANGE UP (ref 3.8–10.5)
WBC # FLD AUTO: 7.39 K/UL — SIGNIFICANT CHANGE UP (ref 3.8–10.5)

## 2025-03-01 PROCEDURE — 99232 SBSQ HOSP IP/OBS MODERATE 35: CPT | Mod: GC

## 2025-03-01 PROCEDURE — 99223 1ST HOSP IP/OBS HIGH 75: CPT | Mod: GC

## 2025-03-01 PROCEDURE — 76775 US EXAM ABDO BACK WALL LIM: CPT | Mod: 26

## 2025-03-01 RX ORDER — SODIUM BICARBONATE 1 MEQ/ML
0.22 SYRINGE (ML) INTRAVENOUS
Qty: 150 | Refills: 0 | Status: DISCONTINUED | OUTPATIENT
Start: 2025-03-01 | End: 2025-03-03

## 2025-03-01 RX ORDER — INSULIN GLARGINE-YFGN 100 [IU]/ML
10 INJECTION, SOLUTION SUBCUTANEOUS AT BEDTIME
Refills: 0 | Status: DISCONTINUED | OUTPATIENT
Start: 2025-03-01 | End: 2025-03-03

## 2025-03-01 RX ADMIN — HEPARIN SODIUM 5000 UNIT(S): 1000 INJECTION INTRAVENOUS; SUBCUTANEOUS at 18:18

## 2025-03-01 RX ADMIN — METOPROLOL SUCCINATE 100 MILLIGRAM(S): 50 TABLET, EXTENDED RELEASE ORAL at 06:24

## 2025-03-01 RX ADMIN — GABAPENTIN 100 MILLIGRAM(S): 400 CAPSULE ORAL at 06:24

## 2025-03-01 RX ADMIN — Medication 1 CAPSULE(S): at 08:42

## 2025-03-01 RX ADMIN — HEPARIN SODIUM 5000 UNIT(S): 1000 INJECTION INTRAVENOUS; SUBCUTANEOUS at 06:24

## 2025-03-01 RX ADMIN — Medication 1 TABLET(S): at 12:30

## 2025-03-01 RX ADMIN — Medication 100 MEQ/KG/HR: at 14:58

## 2025-03-01 RX ADMIN — INSULIN LISPRO 10: 100 INJECTION, SOLUTION INTRAVENOUS; SUBCUTANEOUS at 18:32

## 2025-03-01 RX ADMIN — Medication 40 MILLIGRAM(S): at 06:24

## 2025-03-01 RX ADMIN — GABAPENTIN 100 MILLIGRAM(S): 400 CAPSULE ORAL at 22:01

## 2025-03-01 RX ADMIN — NYSTATIN 1 APPLICATION(S): 100000 CREAM TOPICAL at 07:07

## 2025-03-01 RX ADMIN — Medication 1 CAPSULE(S): at 12:30

## 2025-03-01 RX ADMIN — Medication 5 MILLIGRAM(S): at 19:15

## 2025-03-01 RX ADMIN — Medication 81 MILLIGRAM(S): at 12:37

## 2025-03-01 RX ADMIN — INSULIN LISPRO 6: 100 INJECTION, SOLUTION INTRAVENOUS; SUBCUTANEOUS at 12:36

## 2025-03-01 RX ADMIN — GABAPENTIN 100 MILLIGRAM(S): 400 CAPSULE ORAL at 15:05

## 2025-03-01 RX ADMIN — ATORVASTATIN CALCIUM 40 MILLIGRAM(S): 80 TABLET, FILM COATED ORAL at 22:01

## 2025-03-01 RX ADMIN — AMLODIPINE BESYLATE 5 MILLIGRAM(S): 10 TABLET ORAL at 06:24

## 2025-03-01 RX ADMIN — Medication 25 GRAM(S): at 06:24

## 2025-03-01 RX ADMIN — INSULIN GLARGINE-YFGN 10 UNIT(S): 100 INJECTION, SOLUTION SUBCUTANEOUS at 22:01

## 2025-03-01 RX ADMIN — Medication 1 CAPSULE(S): at 18:21

## 2025-03-01 RX ADMIN — Medication 40 MILLIGRAM(S): at 18:18

## 2025-03-01 RX ADMIN — CLOPIDOGREL BISULFATE 75 MILLIGRAM(S): 75 TABLET, FILM COATED ORAL at 12:30

## 2025-03-01 RX ADMIN — NYSTATIN 1 APPLICATION(S): 100000 CREAM TOPICAL at 18:20
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
## 2025-03-01 NOTE — CONSULT NOTE ADULT - SUBJECTIVE AND OBJECTIVE BOX
[facility] DIVISION OF KIDNEY DISEASES AND HYPERTENSION -- INITIAL CONSULT NOTE  --------------------------------------------------------------------------------  HPI: 74F with medical hx significant for dementia, HTN, DM2, UC s/p colectomy and ileostomy, Afib, CAD, ?CHF, GERD, pancreatic insufficiency, and CKD (baseline Cr ~1.7-2) with recent influenza diagnosed 6 days ago, presents from [facility] with cough, confusion, and poor PO intake. Nephrology consulted for KARO on CKD.    Upon presentation to the ED, vitals stable. Labs significant for Cr 3.1. UA on 2/23 appeared grossly positive but cx negative. Repeat UA on 2/27 appears concentrated with 23 RBCs, no evidence of infection, and UPCR 2g. CT chest c/f possible superimposed PNA. CT A/P showed no hydronephrosis. Pt received crystalloid IVF and Zosyn.    Pt seen and examined at bedside. No longer confused. Answers appropriately. Denied fever, SOB, n/v/d, or trouble urinating. Does report cough.    PAST HISTORY  --------------------------------------------------------------------------------  PAST MEDICAL & SURGICAL HISTORY:    FAMILY HISTORY:    PAST SOCIAL HISTORY:    ALLERGIES & MEDICATIONS  --------------------------------------------------------------------------------  Allergies    dexamethasone (Unknown)  Wellbutrin SR (Unknown)  contrast dye (Unknown)  tramadol (Unknown)  glipiZIDE (Unknown)  morphine (Unknown)      Standing Inpatient Medications  amLODIPine   Tablet 5 milliGRAM(s) Oral daily  aspirin  chewable 81 milliGRAM(s) Oral daily  atorvastatin 40 milliGRAM(s) Oral at bedtime  clopidogrel Tablet 75 milliGRAM(s) Oral daily  dextrose 5%. 1000 milliLiter(s) IV Continuous <Continuous>  dextrose 5%. 1000 milliLiter(s) IV Continuous <Continuous>  dextrose 50% Injectable 25 Gram(s) IV Push once  dextrose 50% Injectable 12.5 Gram(s) IV Push once  dextrose 50% Injectable 25 Gram(s) IV Push once  gabapentin 100 milliGRAM(s) Oral three times a day  glucagon  Injectable 1 milliGRAM(s) IntraMuscular once  heparin   Injectable 5000 Unit(s) SubCutaneous every 12 hours  insulin glargine Injectable (LANTUS) 6 Unit(s) SubCutaneous at bedtime  insulin lispro (ADMELOG) corrective regimen sliding scale   SubCutaneous three times a day before meals  insulin lispro (ADMELOG) corrective regimen sliding scale   SubCutaneous at bedtime  metoprolol succinate  milliGRAM(s) Oral daily  Nephro-deep 1 Tablet(s) Oral daily  nystatin Powder 1 Application(s) Topical two times a day  pancrelipase  (CREON  6,000 Lipase Units) 1 Capsule(s) Oral three times a day with meals  pantoprazole    Tablet 40 milliGRAM(s) Oral two times a day  sodium chloride 0.9%. 1000 milliLiter(s) IV Continuous <Continuous>    PRN Inpatient Medications  calamine/zinc oxide Lotion 1 Application(s) Topical two times a day PRN  dextrose Oral Gel 15 Gram(s) Oral once PRN      REVIEW OF SYSTEMS  --------------------------------------------------------------------------------  Gen: No fever  Respiratory: No dyspnea, +cough  CV: No chest pain  GI: No abdominal pain, diarrhea, nausea, vomiting  : No hematuria  MSK: No joint pain/swelling; no back pain, no edema  Neuro: No dizziness/lightheadedness    All other systems were reviewed and are negative, except as noted.    VITALS/PHYSICAL EXAM  --------------------------------------------------------------------------------  T(C): 36.3 (03-01-25 @ 04:14), Max: 36.7 (02-28-25 @ 13:43)  HR: 76 (03-01-25 @ 04:14) (58 - 89)  BP: 162/77 (03-01-25 @ 04:14) (160/79 - 171/79)  RR: 17 (03-01-25 @ 04:14) (17 - 18)  SpO2: 97% (03-01-25 @ 04:14) (95% - 99%)  Wt(kg): --        02-28-25 @ 07:01  -  03-01-25 @ 07:00  --------------------------------------------------------  IN: 480 mL / OUT: 1900 mL / NET: -1420 mL      PHYSICAL EXAM:  Gen: NAD  Neuro: non-focal  HEENT: Anicteric, dry oral mucosa  Pulm: wheezing bilaterally, no crackles  CV: +S1S2  Abd: soft, non-tender/non-distended  : +primafit  Extremities: no edema  Skin: Warm    LABS/STUDIES  --------------------------------------------------------------------------------              11.4   7.39  >-----------<  298      [03-01-25 @ 07:29]              37.1     145  |  114  |  44  ----------------------------<  154      [03-01-25 @ 07:27]  5.0   |  17  |  3.10        Ca     8.4     [03-01-25 @ 07:27]      Mg     2.0     [03-01-25 @ 07:27]      Phos  2.5     [03-01-25 @ 07:27]    TPro  7.1  /  Alb  3.1  /  TBili  0.3  /  DBili  x   /  AST  13  /  ALT  9   /  AlkPhos  97  [03-01-25 @ 07:27]      Creatinine Trend:  SCr 3.10 [03-01 @ 07:27]  SCr 3.07 [02-28 @ 07:17]  SCr 2.22 [02-27 @ 07:06]  SCr 1.86 [02-26 @ 16:11]  SCr 1.90 [02-26 @ 07:10]      Urine Creatinine 294      [02-27-25 @ 08:43]  Urine Protein 585      [02-27-25 @ 08:43]  Urine Sodium 53      [02-27-25 @ 08:43]  Urine Urea Nitrogen 806      [02-27-25 @ 08:43]  Urine Potassium 70      [02-27-25 @ 08:43]  Urine Osmolality 640      [02-27-25 @ 08:43]

## 2025-03-01 NOTE — CONSULT NOTE ADULT - ATTENDING COMMENTS
#danuta on ckd  in setting of flu  cr continues to uptrend/ possible plateuing between yesterday and today  likely atn   check bladder scan   check cpk  cont IVF   #met acidosis  check venous ph  start bicarb tabs 1300mg po tid  #hyperkalemia  monitor k trend  renal diet  lokelma if needed

## 2025-03-01 NOTE — PROGRESS NOTE ADULT - SUBJECTIVE AND OBJECTIVE BOX
Dhruv Radford M.D.   PGY-1 Internal Medicine  ** Note not finalized until signed by attending **   --------------------------  Dhruv Radford MD  Internal Medicine   PGY-1  --------------------------    SUBJECTIVE / OVERNIGHT EVENTS:    Pt seen in AM at bedside, resting comfortably in bed, and does not appear to be in any acute distress. When asked, pt denies any recent or active fever, chills, nausea, vomiting, headache, acute sob, chest pain, abdominal pain, genitourinary sx, extremity pain or swelling.    MEDICATIONS  (STANDING):  amLODIPine   Tablet 5 milliGRAM(s) Oral daily  aspirin  chewable 81 milliGRAM(s) Oral daily  atorvastatin 40 milliGRAM(s) Oral at bedtime  clopidogrel Tablet 75 milliGRAM(s) Oral daily  dextrose 5%. 1000 milliLiter(s) (50 mL/Hr) IV Continuous <Continuous>  dextrose 5%. 1000 milliLiter(s) (100 mL/Hr) IV Continuous <Continuous>  dextrose 50% Injectable 25 Gram(s) IV Push once  dextrose 50% Injectable 12.5 Gram(s) IV Push once  dextrose 50% Injectable 25 Gram(s) IV Push once  gabapentin 100 milliGRAM(s) Oral three times a day  glucagon  Injectable 1 milliGRAM(s) IntraMuscular once  heparin   Injectable 5000 Unit(s) SubCutaneous every 12 hours  insulin glargine Injectable (LANTUS) 6 Unit(s) SubCutaneous at bedtime  insulin lispro (ADMELOG) corrective regimen sliding scale   SubCutaneous three times a day before meals  insulin lispro (ADMELOG) corrective regimen sliding scale   SubCutaneous at bedtime  metoprolol succinate  milliGRAM(s) Oral daily  Nephro-deep 1 Tablet(s) Oral daily  nystatin Powder 1 Application(s) Topical two times a day  pancrelipase  (CREON  6,000 Lipase Units) 1 Capsule(s) Oral three times a day with meals  pantoprazole    Tablet 40 milliGRAM(s) Oral two times a day  sodium chloride 0.9%. 1000 milliLiter(s) (75 mL/Hr) IV Continuous <Continuous>    MEDICATIONS  (PRN):  calamine/zinc oxide Lotion 1 Application(s) Topical two times a day PRN Rash and/or Itching  dextrose Oral Gel 15 Gram(s) Oral once PRN Blood Glucose LESS THAN 70 milliGRAM(s)/deciliter    CAPILLARYBLOOD GLUCOSE    POCT Blood Glucose.: 198 mg/dL (01 Mar 2025 07:48)  POCT Blood Glucose.: 164 mg/dL (28 Feb 2025 21:20)  POCT Blood Glucose.: 251 mg/dL (28 Feb 2025 16:52)  POCT Blood Glucose.: 256 mg/dL (28 Feb 2025 12:47)    I&O's Summary    28 Feb 2025 07:01  -  01 Mar 2025 07:00  --------------------------------------------------------  IN: 480 mL / OUT: 1900 mL / NET: -1420 mL    PHYSICAL EXAM:  Vital Signs Last 24 Hrs  T(C): 36.3 (01 Mar 2025 04:14), Max: 36.7 (28 Feb 2025 13:43)  T(F): 97.3 (01 Mar 2025 04:14), Max: 98 (28 Feb 2025 13:43)  HR: 76 (01 Mar 2025 04:14) (58 - 89)  BP: 162/77 (01 Mar 2025 04:14) (160/79 - 171/79)  RR: 17 (01 Mar 2025 04:14) (17 - 18)  SpO2: 97% (01 Mar 2025 04:14) (95% - 99%)    Parameters below as of 01 Mar 2025 04:14  Patient On (Oxygen Delivery Method): room air    CONSTITUTIONAL: NAD; well-developed  HEENT: PERRL, clear conjunctiva  RESPIRATORY: Normal respiratory effort; lungs are clear to auscultation bilaterally; No Crackles/Rhonchi/Wheezing  CARDIOVASCULAR: Regular rate and rhythm, normal S1 and S2, no murmur/rub/gallop; No lower extremity edema; Peripheral pulses are 2+ bilaterally  ABDOMEN: Nontender to palpation, normoactive bowel sounds, no rebound/guarding; No hepatosplenomegaly  MUSCULOSKELETAL: no clubbing or cyanosis of digits; no joint swelling or tenderness to palpation  EXTREMITY: Improving L hand/L wrist/L forearm bruising. Tender to palpation.   NEURO: A&Ox3; verbal. Conversive.  PSYCH: normal mood; Affect appropirate    LABS:                        11.4   7.39  )-----------( 298      ( 01 Mar 2025 07:29 )             37.1     03-01    145  |  114[H]  |  44[H]  ----------------------------<  154[H]  5.0   |  17[L]  |  3.10[H]    Ca    8.4      01 Mar 2025 07:27  Phos  2.5     03-01  Mg     2.0     03-01    TPro  7.1  /  Alb  3.1[L]  /  TBili  0.3  /  DBili  x   /  AST  13  /  ALT  9[L]  /  AlkPhos  97  03-01    Urinalysis Basic - ( 01 Mar 2025 07:27 )  Color: x / Appearance: x / SG: x / pH: x  Gluc: 154 mg/dL / Ketone: x  / Bili: x / Urobili: x   Blood: x / Protein: x / Nitrite: x   Leuk Esterase: x / RBC: x / WBC x   Sq Epi: x / Non Sq Epi: x / Bacteria: x    RADIOOGY & ADDITIONAL TESTS:  Results Reviewed: X  Imaging Personally Reviewed: X  Electrocardiogram Personally Reviewed: X

## 2025-03-01 NOTE — CONSULT NOTE ADULT - ASSESSMENT
74F with medical hx significant for dementia, HTN, DM2, UC s/p colectomy and ileostomy, Afib, CAD, ?CHF, GERD, pancreatic insufficiency, and CKD (baseline Cr ~1.7-2) with recent influenza diagnosed 6 days ago, presents from [facility] with cough, confusion, and poor PO intake.     Nephrology consulted for KARO on CKD.    #KARO on CKD, likely ATN  Baseline Cr 1.7-2. Labs significant for Cr 3.1. UA on 2/23 appeared grossly positive but cx negative. Repeat UA on 2/27 appears concentrated with 23 RBCs, no evidence of infection, and UPCR 2g. CT chest c/f possible superimposed PNA. CT A/P showed no hydronephrosis. Pt received crystalloid IVF and Zosyn.    Plan:  - Change IVF to sodium bicarb infusion 150meq in D5w at 100 cc/hr x 10h given acidosis and rising serum potassium  - Bladder scan with PVR to ensure no retention  - Consider repeat CXR and BNP to assess volume status. Pt not on supplemental O2, C/w fluids as above.  - Monitor labs and UOP  - Avoid nephrotoxins and dose meds per eGFR      Kenneth De La O, PGY-5  Nephrology Fellow  MS TEAMS preferred  (After 5pm or on weekends, please contact the fellow on call).  74F with medical hx significant for dementia, HTN, DM2, UC s/p colectomy and ileostomy, Afib, CAD, ?CHF, GERD, pancreatic insufficiency, and CKD (baseline Cr ~1.7-2) with recent influenza diagnosed 6 days ago, presents from [facility] with cough, confusion, and poor PO intake.     Nephrology consulted for KARO on CKD.    #KARO on CKD, likely ATN  Baseline Cr 1.7-2. Labs significant for Cr 3.1. UA on 2/23 appeared grossly positive but cx negative. Repeat UA on 2/27 appears concentrated with 23 RBCs, no evidence of infection, and UPCR 2g. CT chest c/f possible superimposed PNA. CT A/P showed no hydronephrosis. Pt received crystalloid IVF and Zosyn.    Plan:  - Change IVF to sodium bicarb infusion 150meq in D5w at 100 cc/hr x 10h given acidosis and rising serum potassium  - Bladder scan with PVR to ensure no retention  - Check CK level to r/o rhabdo  - Consider repeat CXR and BNP to assess volume status. Pt not on supplemental O2, C/w fluids as above.  - Monitor labs and UOP  - Avoid nephrotoxins and dose meds per eGFR      Kenneth De La O, PGY-5  Nephrology Fellow  MS TEAMS preferred  (After 5pm or on weekends, please contact the fellow on call).  74F with medical hx significant for dementia, HTN, DM2, UC s/p colectomy and ileostomy, Afib, CAD, ?CHF, GERD, pancreatic insufficiency, and CKD (baseline Cr ~1.7-2) with recent influenza diagnosed 6 days ago, presents from [facility] with cough, confusion, and poor PO intake.     Nephrology consulted for KARO on CKD.    #KARO on CKD, likely ATN  Baseline Cr 1.7-2. Labs significant for Cr 3.1. UA on 2/23 appeared grossly positive but cx negative. Repeat UA on 2/27 appears concentrated with 23 RBCs, no evidence of infection, and UPCR 2g. CT chest c/f possible superimposed PNA. CT A/P showed no hydronephrosis. Pt received crystalloid IVF and Zosyn.    Plan:  - cont IVF  - Bladder scan with PVR to ensure no retention  - Check CK level to r/o rhabdo  - Consider repeat CXR and BNP to assess volume status. Pt not on supplemental O2, C/w fluids as above.  - Monitor labs and UOP  - Avoid nephrotoxins and dose meds per eGFR      Kenneth De La O, PGY-5  Nephrology Fellow  MS TEAMS preferred  (After 5pm or on weekends, please contact the fellow on call).

## 2025-03-02 LAB
ALBUMIN SERPL ELPH-MCNC: 3.2 G/DL — LOW (ref 3.3–5)
ALP SERPL-CCNC: 98 U/L — SIGNIFICANT CHANGE UP (ref 40–120)
ALT FLD-CCNC: 9 U/L — LOW (ref 10–45)
ANION GAP SERPL CALC-SCNC: 12 MMOL/L — SIGNIFICANT CHANGE UP (ref 5–17)
AST SERPL-CCNC: 17 U/L — SIGNIFICANT CHANGE UP (ref 10–40)
BASOPHILS # BLD AUTO: 0.07 K/UL — SIGNIFICANT CHANGE UP (ref 0–0.2)
BASOPHILS NFR BLD AUTO: 0.8 % — SIGNIFICANT CHANGE UP (ref 0–2)
BILIRUB SERPL-MCNC: 0.4 MG/DL — SIGNIFICANT CHANGE UP (ref 0.2–1.2)
BUN SERPL-MCNC: 40 MG/DL — HIGH (ref 7–23)
CALCIUM SERPL-MCNC: 8.8 MG/DL — SIGNIFICANT CHANGE UP (ref 8.4–10.5)
CHLORIDE SERPL-SCNC: 113 MMOL/L — HIGH (ref 96–108)
CO2 SERPL-SCNC: 17 MMOL/L — LOW (ref 22–31)
CREAT SERPL-MCNC: 2.58 MG/DL — HIGH (ref 0.5–1.3)
EGFR: 19 ML/MIN/1.73M2 — LOW
EGFR: 19 ML/MIN/1.73M2 — LOW
EOSINOPHIL # BLD AUTO: 0.47 K/UL — SIGNIFICANT CHANGE UP (ref 0–0.5)
EOSINOPHIL NFR BLD AUTO: 5.2 % — SIGNIFICANT CHANGE UP (ref 0–6)
GLUCOSE BLDC GLUCOMTR-MCNC: 109 MG/DL — HIGH (ref 70–99)
GLUCOSE BLDC GLUCOMTR-MCNC: 219 MG/DL — HIGH (ref 70–99)
GLUCOSE BLDC GLUCOMTR-MCNC: 221 MG/DL — HIGH (ref 70–99)
GLUCOSE BLDC GLUCOMTR-MCNC: 292 MG/DL — HIGH (ref 70–99)
GLUCOSE SERPL-MCNC: 99 MG/DL — SIGNIFICANT CHANGE UP (ref 70–99)
HCT VFR BLD CALC: 35.4 % — SIGNIFICANT CHANGE UP (ref 34.5–45)
HGB BLD-MCNC: 11.1 G/DL — LOW (ref 11.5–15.5)
IMM GRANULOCYTES NFR BLD AUTO: 0.9 % — SIGNIFICANT CHANGE UP (ref 0–0.9)
LYMPHOCYTES # BLD AUTO: 1.41 K/UL — SIGNIFICANT CHANGE UP (ref 1–3.3)
LYMPHOCYTES # BLD AUTO: 15.5 % — SIGNIFICANT CHANGE UP (ref 13–44)
MAGNESIUM SERPL-MCNC: 1.8 MG/DL — SIGNIFICANT CHANGE UP (ref 1.6–2.6)
MCHC RBC-ENTMCNC: 30.2 PG — SIGNIFICANT CHANGE UP (ref 27–34)
MCHC RBC-ENTMCNC: 31.4 G/DL — LOW (ref 32–36)
MCV RBC AUTO: 96.5 FL — SIGNIFICANT CHANGE UP (ref 80–100)
MONOCYTES # BLD AUTO: 0.74 K/UL — SIGNIFICANT CHANGE UP (ref 0–0.9)
MONOCYTES NFR BLD AUTO: 8.1 % — SIGNIFICANT CHANGE UP (ref 2–14)
NEUTROPHILS # BLD AUTO: 6.35 K/UL — SIGNIFICANT CHANGE UP (ref 1.8–7.4)
NEUTROPHILS NFR BLD AUTO: 69.5 % — SIGNIFICANT CHANGE UP (ref 43–77)
NRBC BLD AUTO-RTO: 0 /100 WBCS — SIGNIFICANT CHANGE UP (ref 0–0)
NT-PROBNP SERPL-SCNC: HIGH PG/ML (ref 0–300)
PHOSPHATE SERPL-MCNC: 2.1 MG/DL — LOW (ref 2.5–4.5)
PLATELET # BLD AUTO: 296 K/UL — SIGNIFICANT CHANGE UP (ref 150–400)
POTASSIUM SERPL-MCNC: 5 MMOL/L — SIGNIFICANT CHANGE UP (ref 3.5–5.3)
POTASSIUM SERPL-SCNC: 5 MMOL/L — SIGNIFICANT CHANGE UP (ref 3.5–5.3)
PROT SERPL-MCNC: 7.2 G/DL — SIGNIFICANT CHANGE UP (ref 6–8.3)
RBC # BLD: 3.67 M/UL — LOW (ref 3.8–5.2)
RBC # FLD: 13.4 % — SIGNIFICANT CHANGE UP (ref 10.3–14.5)
SODIUM SERPL-SCNC: 142 MMOL/L — SIGNIFICANT CHANGE UP (ref 135–145)
WBC # BLD: 9.12 K/UL — SIGNIFICANT CHANGE UP (ref 3.8–10.5)
WBC # FLD AUTO: 9.12 K/UL — SIGNIFICANT CHANGE UP (ref 3.8–10.5)

## 2025-03-02 PROCEDURE — 99232 SBSQ HOSP IP/OBS MODERATE 35: CPT | Mod: GC

## 2025-03-02 RX ORDER — SODIUM CHLORIDE 9 G/1000ML
1000 INJECTION, SOLUTION INTRAVENOUS
Refills: 0 | Status: DISCONTINUED | OUTPATIENT
Start: 2025-03-02 | End: 2025-03-03

## 2025-03-02 RX ORDER — SODIUM BICARBONATE 1 MEQ/ML
1300 SYRINGE (ML) INTRAVENOUS
Refills: 0 | Status: DISCONTINUED | OUTPATIENT
Start: 2025-03-02 | End: 2025-03-05

## 2025-03-02 RX ADMIN — GABAPENTIN 100 MILLIGRAM(S): 400 CAPSULE ORAL at 14:29

## 2025-03-02 RX ADMIN — Medication 40 MILLIGRAM(S): at 17:29

## 2025-03-02 RX ADMIN — SODIUM CHLORIDE 75 MILLILITER(S): 9 INJECTION, SOLUTION INTRAVENOUS at 12:06

## 2025-03-02 RX ADMIN — GABAPENTIN 100 MILLIGRAM(S): 400 CAPSULE ORAL at 05:41

## 2025-03-02 RX ADMIN — Medication 1 TABLET(S): at 12:06

## 2025-03-02 RX ADMIN — NYSTATIN 1 APPLICATION(S): 100000 CREAM TOPICAL at 05:41

## 2025-03-02 RX ADMIN — Medication 1 CAPSULE(S): at 12:06

## 2025-03-02 RX ADMIN — METOPROLOL SUCCINATE 100 MILLIGRAM(S): 50 TABLET, EXTENDED RELEASE ORAL at 05:41

## 2025-03-02 RX ADMIN — AMLODIPINE BESYLATE 5 MILLIGRAM(S): 10 TABLET ORAL at 05:41

## 2025-03-02 RX ADMIN — HEPARIN SODIUM 5000 UNIT(S): 1000 INJECTION INTRAVENOUS; SUBCUTANEOUS at 05:41

## 2025-03-02 RX ADMIN — INSULIN LISPRO 4: 100 INJECTION, SOLUTION INTRAVENOUS; SUBCUTANEOUS at 12:07

## 2025-03-02 RX ADMIN — INSULIN GLARGINE-YFGN 10 UNIT(S): 100 INJECTION, SOLUTION SUBCUTANEOUS at 21:46

## 2025-03-02 RX ADMIN — CLOPIDOGREL BISULFATE 75 MILLIGRAM(S): 75 TABLET, FILM COATED ORAL at 12:06

## 2025-03-02 RX ADMIN — Medication 1300 MILLIGRAM(S): at 17:28

## 2025-03-02 RX ADMIN — GABAPENTIN 100 MILLIGRAM(S): 400 CAPSULE ORAL at 21:46

## 2025-03-02 RX ADMIN — Medication 1 CAPSULE(S): at 08:33

## 2025-03-02 RX ADMIN — ATORVASTATIN CALCIUM 40 MILLIGRAM(S): 80 TABLET, FILM COATED ORAL at 21:46

## 2025-03-02 RX ADMIN — NYSTATIN 1 APPLICATION(S): 100000 CREAM TOPICAL at 17:29

## 2025-03-02 RX ADMIN — INSULIN LISPRO 6: 100 INJECTION, SOLUTION INTRAVENOUS; SUBCUTANEOUS at 17:29

## 2025-03-02 RX ADMIN — Medication 40 MILLIGRAM(S): at 05:41

## 2025-03-02 RX ADMIN — Medication 81 MILLIGRAM(S): at 12:06

## 2025-03-02 RX ADMIN — HEPARIN SODIUM 5000 UNIT(S): 1000 INJECTION INTRAVENOUS; SUBCUTANEOUS at 17:28

## 2025-03-02 RX ADMIN — Medication 1 CAPSULE(S): at 17:29

## 2025-03-02 RX ADMIN — Medication 5 MILLIGRAM(S): at 17:47

## 2025-03-02 NOTE — PROGRESS NOTE ADULT - SUBJECTIVE AND OBJECTIVE BOX
INTERVAL HPI/OVERNIGHT EVENTS:  Pt seen and examined at bedside. No acute overnight events or complaints.    VITAL SIGNS:  T(F): 98 (03-02-25 @ 05:07)  HR: 64 (03-02-25 @ 05:07)  BP: 157/76 (03-02-25 @ 05:07)  RR: 18 (03-02-25 @ 05:07)  SpO2: 96% (03-02-25 @ 05:07)  Wt(kg): --    PHYSICAL EXAM:    Constitutional: WDWN, NAD  HEENT: PERRL, EOMI, sclera non-icteric, neck supple, trachea midline, no masses, no JVD, MMM, good dentition  Respiratory: CTA b/l, good air entry b/l, no wheezing, no rhonchi, no rales, without accessory muscle use and no intercostal retractions  Cardiovascular: RRR, normal S1S2, no M/R/G  Gastrointestinal: soft, NTND, no masses palpable, BS normal  Extremities: Warm, well perfused, pulses equal bilateral upper and lower extremities, no edema, no clubbing. Capillary refill <2 sec  Neurological: AAOx3, CN Grossly intact  Skin: Normal temperature, warm, dry    MEDICATIONS  (STANDING):  amLODIPine   Tablet 5 milliGRAM(s) Oral daily  aspirin  chewable 81 milliGRAM(s) Oral daily  atorvastatin 40 milliGRAM(s) Oral at bedtime  clopidogrel Tablet 75 milliGRAM(s) Oral daily  dextrose 5%. 1000 milliLiter(s) (100 mL/Hr) IV Continuous <Continuous>  dextrose 5%. 1000 milliLiter(s) (50 mL/Hr) IV Continuous <Continuous>  dextrose 50% Injectable 25 Gram(s) IV Push once  dextrose 50% Injectable 12.5 Gram(s) IV Push once  dextrose 50% Injectable 25 Gram(s) IV Push once  gabapentin 100 milliGRAM(s) Oral three times a day  glucagon  Injectable 1 milliGRAM(s) IntraMuscular once  heparin   Injectable 5000 Unit(s) SubCutaneous every 12 hours  insulin glargine Injectable (LANTUS) 10 Unit(s) SubCutaneous at bedtime  insulin lispro (ADMELOG) corrective regimen sliding scale   SubCutaneous three times a day before meals  insulin lispro (ADMELOG) corrective regimen sliding scale   SubCutaneous at bedtime  metoprolol succinate  milliGRAM(s) Oral daily  Nephro-deep 1 Tablet(s) Oral daily  nystatin Powder 1 Application(s) Topical two times a day  pancrelipase  (CREON  6,000 Lipase Units) 1 Capsule(s) Oral three times a day with meals  pantoprazole    Tablet 40 milliGRAM(s) Oral two times a day  sodium bicarbonate  Infusion 0.221 mEq/kG/Hr (100 mL/Hr) IV Continuous <Continuous>    MEDICATIONS  (PRN):  calamine/zinc oxide Lotion 1 Application(s) Topical two times a day PRN Rash and/or Itching  dextrose Oral Gel 15 Gram(s) Oral once PRN Blood Glucose LESS THAN 70 milliGRAM(s)/deciliter      Allergies    dexamethasone (Unknown)  Wellbutrin SR (Unknown)  contrast dye (Unknown)  tramadol (Unknown)  glipiZIDE (Unknown)  morphine (Unknown)    Intolerances        LABS:                        11.1   9.12  )-----------( 296      ( 02 Mar 2025 07:13 )             35.4     03-01    145  |  114[H]  |  44[H]  ----------------------------<  154[H]  5.0   |  17[L]  |  3.10[H]    Ca    8.4      01 Mar 2025 07:27  Phos  2.5     03-01  Mg     2.0     03-01    TPro  7.1  /  Alb  3.1[L]  /  TBili  0.3  /  DBili  x   /  AST  13  /  ALT  9[L]  /  AlkPhos  97  03-01      Urinalysis Basic - ( 01 Mar 2025 07:27 )    Color: x / Appearance: x / SG: x / pH: x  Gluc: 154 mg/dL / Ketone: x  / Bili: x / Urobili: x   Blood: x / Protein: x / Nitrite: x   Leuk Esterase: x / RBC: x / WBC x   Sq Epi: x / Non Sq Epi: x / Bacteria: x        RADIOLOGY & ADDITIONAL TESTS:  Reviewed      ******************  Authored By: Acacia Galloway MD PGY3  Internal Medicine  Pager: [phone] [facility]// [id number] [facility]  MS Teams Preferred  ******************
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
## 2025-03-03 LAB
ADD ON TEST-SPECIMEN IN LAB: SIGNIFICANT CHANGE UP
ALBUMIN SERPL ELPH-MCNC: 3.3 G/DL — SIGNIFICANT CHANGE UP (ref 3.3–5)
ALP SERPL-CCNC: 109 U/L — SIGNIFICANT CHANGE UP (ref 40–120)
ALT FLD-CCNC: 9 U/L — LOW (ref 10–45)
ANION GAP SERPL CALC-SCNC: 11 MMOL/L — SIGNIFICANT CHANGE UP (ref 5–17)
ANION GAP SERPL CALC-SCNC: 9 MMOL/L — SIGNIFICANT CHANGE UP (ref 5–17)
AST SERPL-CCNC: 18 U/L — SIGNIFICANT CHANGE UP (ref 10–40)
BASOPHILS # BLD AUTO: 0.08 K/UL — SIGNIFICANT CHANGE UP (ref 0–0.2)
BASOPHILS NFR BLD AUTO: 0.8 % — SIGNIFICANT CHANGE UP (ref 0–2)
BILIRUB SERPL-MCNC: 0.3 MG/DL — SIGNIFICANT CHANGE UP (ref 0.2–1.2)
BUN SERPL-MCNC: 39 MG/DL — HIGH (ref 7–23)
BUN SERPL-MCNC: 39 MG/DL — HIGH (ref 7–23)
CALCIUM SERPL-MCNC: 8.8 MG/DL — SIGNIFICANT CHANGE UP (ref 8.4–10.5)
CALCIUM SERPL-MCNC: 8.9 MG/DL — SIGNIFICANT CHANGE UP (ref 8.4–10.5)
CHLORIDE SERPL-SCNC: 110 MMOL/L — HIGH (ref 96–108)
CHLORIDE SERPL-SCNC: 110 MMOL/L — HIGH (ref 96–108)
CK SERPL-CCNC: 80 U/L — SIGNIFICANT CHANGE UP (ref 25–170)
CO2 SERPL-SCNC: 20 MMOL/L — LOW (ref 22–31)
CO2 SERPL-SCNC: 20 MMOL/L — LOW (ref 22–31)
CREAT SERPL-MCNC: 2.09 MG/DL — HIGH (ref 0.5–1.3)
CREAT SERPL-MCNC: 2.21 MG/DL — HIGH (ref 0.5–1.3)
EGFR: 23 ML/MIN/1.73M2 — LOW
EGFR: 23 ML/MIN/1.73M2 — LOW
EGFR: 24 ML/MIN/1.73M2 — LOW
EGFR: 24 ML/MIN/1.73M2 — LOW
EOSINOPHIL # BLD AUTO: 0.42 K/UL — SIGNIFICANT CHANGE UP (ref 0–0.5)
EOSINOPHIL NFR BLD AUTO: 4.3 % — SIGNIFICANT CHANGE UP (ref 0–6)
GLUCOSE BLDC GLUCOMTR-MCNC: 131 MG/DL — HIGH (ref 70–99)
GLUCOSE BLDC GLUCOMTR-MCNC: 162 MG/DL — HIGH (ref 70–99)
GLUCOSE BLDC GLUCOMTR-MCNC: 172 MG/DL — HIGH (ref 70–99)
GLUCOSE BLDC GLUCOMTR-MCNC: 190 MG/DL — HIGH (ref 70–99)
GLUCOSE BLDC GLUCOMTR-MCNC: 209 MG/DL — HIGH (ref 70–99)
GLUCOSE BLDC GLUCOMTR-MCNC: 215 MG/DL — HIGH (ref 70–99)
GLUCOSE BLDC GLUCOMTR-MCNC: 229 MG/DL — HIGH (ref 70–99)
GLUCOSE SERPL-MCNC: 227 MG/DL — HIGH (ref 70–99)
GLUCOSE SERPL-MCNC: 246 MG/DL — HIGH (ref 70–99)
HCT VFR BLD CALC: 36.2 % — SIGNIFICANT CHANGE UP (ref 34.5–45)
HGB BLD-MCNC: 11.2 G/DL — LOW (ref 11.5–15.5)
IMM GRANULOCYTES NFR BLD AUTO: 0.9 % — SIGNIFICANT CHANGE UP (ref 0–0.9)
LYMPHOCYTES # BLD AUTO: 1.51 K/UL — SIGNIFICANT CHANGE UP (ref 1–3.3)
LYMPHOCYTES # BLD AUTO: 15.5 % — SIGNIFICANT CHANGE UP (ref 13–44)
MAGNESIUM SERPL-MCNC: 1.6 MG/DL — SIGNIFICANT CHANGE UP (ref 1.6–2.6)
MCHC RBC-ENTMCNC: 29.7 PG — SIGNIFICANT CHANGE UP (ref 27–34)
MCHC RBC-ENTMCNC: 30.9 G/DL — LOW (ref 32–36)
MCV RBC AUTO: 96 FL — SIGNIFICANT CHANGE UP (ref 80–100)
MONOCYTES # BLD AUTO: 0.71 K/UL — SIGNIFICANT CHANGE UP (ref 0–0.9)
MONOCYTES NFR BLD AUTO: 7.3 % — SIGNIFICANT CHANGE UP (ref 2–14)
NEUTROPHILS # BLD AUTO: 6.91 K/UL — SIGNIFICANT CHANGE UP (ref 1.8–7.4)
NEUTROPHILS NFR BLD AUTO: 71.2 % — SIGNIFICANT CHANGE UP (ref 43–77)
NRBC BLD AUTO-RTO: 0 /100 WBCS — SIGNIFICANT CHANGE UP (ref 0–0)
PHOSPHATE SERPL-MCNC: 2 MG/DL — LOW (ref 2.5–4.5)
PLATELET # BLD AUTO: 301 K/UL — SIGNIFICANT CHANGE UP (ref 150–400)
POTASSIUM SERPL-MCNC: 5.6 MMOL/L — HIGH (ref 3.5–5.3)
POTASSIUM SERPL-MCNC: 6 MMOL/L — HIGH (ref 3.5–5.3)
POTASSIUM SERPL-SCNC: 5.6 MMOL/L — HIGH (ref 3.5–5.3)
POTASSIUM SERPL-SCNC: 6 MMOL/L — HIGH (ref 3.5–5.3)
PROT SERPL-MCNC: 7.1 G/DL — SIGNIFICANT CHANGE UP (ref 6–8.3)
RBC # BLD: 3.77 M/UL — LOW (ref 3.8–5.2)
RBC # FLD: 13.9 % — SIGNIFICANT CHANGE UP (ref 10.3–14.5)
SODIUM SERPL-SCNC: 139 MMOL/L — SIGNIFICANT CHANGE UP (ref 135–145)
SODIUM SERPL-SCNC: 141 MMOL/L — SIGNIFICANT CHANGE UP (ref 135–145)
WBC # BLD: 9.72 K/UL — SIGNIFICANT CHANGE UP (ref 3.8–10.5)
WBC # FLD AUTO: 9.72 K/UL — SIGNIFICANT CHANGE UP (ref 3.8–10.5)

## 2025-03-03 PROCEDURE — 99232 SBSQ HOSP IP/OBS MODERATE 35: CPT | Mod: GC

## 2025-03-03 PROCEDURE — 99232 SBSQ HOSP IP/OBS MODERATE 35: CPT

## 2025-03-03 RX ORDER — INSULIN GLARGINE-YFGN 100 [IU]/ML
15 INJECTION, SOLUTION SUBCUTANEOUS AT BEDTIME
Refills: 0 | Status: DISCONTINUED | OUTPATIENT
Start: 2025-03-03 | End: 2025-03-03

## 2025-03-03 RX ORDER — SODIUM PHOSPHATE,DIBASIC DIHYD
30 POWDER (GRAM) MISCELLANEOUS ONCE
Refills: 0 | Status: COMPLETED | OUTPATIENT
Start: 2025-03-03 | End: 2025-03-03

## 2025-03-03 RX ORDER — APIXABAN 2.5 MG/1
10 TABLET, FILM COATED ORAL
Refills: 0 | Status: DISCONTINUED | OUTPATIENT
Start: 2025-03-03 | End: 2025-03-05

## 2025-03-03 RX ORDER — CALCIUM GLUCONATE 20 MG/ML
1 INJECTION, SOLUTION INTRAVENOUS ONCE
Refills: 0 | Status: COMPLETED | OUTPATIENT
Start: 2025-03-03 | End: 2025-03-03

## 2025-03-03 RX ORDER — DEXTROSE 50 % IN WATER 50 %
50 SYRINGE (ML) INTRAVENOUS ONCE
Refills: 0 | Status: COMPLETED | OUTPATIENT
Start: 2025-03-03 | End: 2025-03-03

## 2025-03-03 RX ORDER — INSULIN LISPRO 100 U/ML
4 INJECTION, SOLUTION INTRAVENOUS; SUBCUTANEOUS
Refills: 0 | Status: DISCONTINUED | OUTPATIENT
Start: 2025-03-03 | End: 2025-03-05

## 2025-03-03 RX ORDER — CALCIUM GLUCONATE 20 MG/ML
1 INJECTION, SOLUTION INTRAVENOUS ONCE
Refills: 0 | Status: DISCONTINUED | OUTPATIENT
Start: 2025-03-03 | End: 2025-03-03

## 2025-03-03 RX ORDER — INSULIN GLARGINE-YFGN 100 [IU]/ML
12 INJECTION, SOLUTION SUBCUTANEOUS AT BEDTIME
Refills: 0 | Status: DISCONTINUED | OUTPATIENT
Start: 2025-03-03 | End: 2025-03-05

## 2025-03-03 RX ORDER — SODIUM ZIRCONIUM CYCLOSILICATE 5 G/5G
10 POWDER, FOR SUSPENSION ORAL ONCE
Refills: 0 | Status: COMPLETED | OUTPATIENT
Start: 2025-03-03 | End: 2025-03-03

## 2025-03-03 RX ORDER — ALBUTEROL SULFATE 2.5 MG/3ML
10 VIAL, NEBULIZER (ML) INHALATION ONCE
Refills: 0 | Status: COMPLETED | OUTPATIENT
Start: 2025-03-03 | End: 2025-03-03

## 2025-03-03 RX ORDER — AMLODIPINE BESYLATE 10 MG/1
10 TABLET ORAL DAILY
Refills: 0 | Status: DISCONTINUED | OUTPATIENT
Start: 2025-03-03 | End: 2025-03-05

## 2025-03-03 RX ORDER — SOD PHOS DI, MONO/K PHOS MONO 250 MG
2 TABLET ORAL ONCE
Refills: 0 | Status: DISCONTINUED | OUTPATIENT
Start: 2025-03-03 | End: 2025-03-03

## 2025-03-03 RX ORDER — INSULIN LISPRO 100 U/ML
2 INJECTION, SOLUTION INTRAVENOUS; SUBCUTANEOUS
Refills: 0 | Status: DISCONTINUED | OUTPATIENT
Start: 2025-03-03 | End: 2025-03-03

## 2025-03-03 RX ADMIN — Medication 40 MILLIGRAM(S): at 05:37

## 2025-03-03 RX ADMIN — GABAPENTIN 100 MILLIGRAM(S): 400 CAPSULE ORAL at 05:36

## 2025-03-03 RX ADMIN — HEPARIN SODIUM 5000 UNIT(S): 1000 INJECTION INTRAVENOUS; SUBCUTANEOUS at 05:36

## 2025-03-03 RX ADMIN — Medication 1300 MILLIGRAM(S): at 05:36

## 2025-03-03 RX ADMIN — Medication 50 MILLILITER(S): at 16:19

## 2025-03-03 RX ADMIN — SODIUM CHLORIDE 75 MILLILITER(S): 9 INJECTION, SOLUTION INTRAVENOUS at 08:46

## 2025-03-03 RX ADMIN — METOPROLOL SUCCINATE 100 MILLIGRAM(S): 50 TABLET, EXTENDED RELEASE ORAL at 05:36

## 2025-03-03 RX ADMIN — INSULIN LISPRO 2: 100 INJECTION, SOLUTION INTRAVENOUS; SUBCUTANEOUS at 17:25

## 2025-03-03 RX ADMIN — AMLODIPINE BESYLATE 10 MILLIGRAM(S): 10 TABLET ORAL at 17:27

## 2025-03-03 RX ADMIN — CLOPIDOGREL BISULFATE 75 MILLIGRAM(S): 75 TABLET, FILM COATED ORAL at 08:46

## 2025-03-03 RX ADMIN — Medication 1300 MILLIGRAM(S): at 18:43

## 2025-03-03 RX ADMIN — Medication 1 TABLET(S): at 08:45

## 2025-03-03 RX ADMIN — Medication 85 MILLIMOLE(S): at 17:28

## 2025-03-03 RX ADMIN — Medication 10 MILLIGRAM(S): at 17:26

## 2025-03-03 RX ADMIN — Medication 1 CAPSULE(S): at 08:46

## 2025-03-03 RX ADMIN — SODIUM ZIRCONIUM CYCLOSILICATE 10 GRAM(S): 5 POWDER, FOR SUSPENSION ORAL at 22:49

## 2025-03-03 RX ADMIN — SODIUM ZIRCONIUM CYCLOSILICATE 10 GRAM(S): 5 POWDER, FOR SUSPENSION ORAL at 17:26

## 2025-03-03 RX ADMIN — APIXABAN 10 MILLIGRAM(S): 2.5 TABLET, FILM COATED ORAL at 17:26

## 2025-03-03 RX ADMIN — NYSTATIN 1 APPLICATION(S): 100000 CREAM TOPICAL at 05:56

## 2025-03-03 RX ADMIN — Medication 40 MILLIGRAM(S): at 17:27

## 2025-03-03 RX ADMIN — INSULIN GLARGINE-YFGN 12 UNIT(S): 100 INJECTION, SOLUTION SUBCUTANEOUS at 22:50

## 2025-03-03 RX ADMIN — INSULIN LISPRO 4 UNIT(S): 100 INJECTION, SOLUTION INTRAVENOUS; SUBCUTANEOUS at 13:01

## 2025-03-03 RX ADMIN — ATORVASTATIN CALCIUM 40 MILLIGRAM(S): 80 TABLET, FILM COATED ORAL at 22:49

## 2025-03-03 RX ADMIN — Medication 81 MILLIGRAM(S): at 08:45

## 2025-03-03 RX ADMIN — INSULIN LISPRO 2 UNIT(S): 100 INJECTION, SOLUTION INTRAVENOUS; SUBCUTANEOUS at 08:45

## 2025-03-03 RX ADMIN — Medication 1 CAPSULE(S): at 17:27

## 2025-03-03 RX ADMIN — GABAPENTIN 100 MILLIGRAM(S): 400 CAPSULE ORAL at 13:01

## 2025-03-03 RX ADMIN — AMLODIPINE BESYLATE 5 MILLIGRAM(S): 10 TABLET ORAL at 05:35

## 2025-03-03 RX ADMIN — NYSTATIN 1 APPLICATION(S): 100000 CREAM TOPICAL at 17:27

## 2025-03-03 RX ADMIN — Medication 1 CAPSULE(S): at 12:51

## 2025-03-03 RX ADMIN — GABAPENTIN 100 MILLIGRAM(S): 400 CAPSULE ORAL at 22:49

## 2025-03-03 RX ADMIN — Medication 5 UNIT(S): at 16:21

## 2025-03-03 RX ADMIN — CALCIUM GLUCONATE 100 GRAM(S): 20 INJECTION, SOLUTION INTRAVENOUS at 17:28

## 2025-03-03 RX ADMIN — INSULIN LISPRO 2: 100 INJECTION, SOLUTION INTRAVENOUS; SUBCUTANEOUS at 12:51
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
## 2025-03-03 NOTE — PROGRESS NOTE ADULT - SUBJECTIVE AND OBJECTIVE BOX
Dhruv Radford M.D.   PGY-1 Internal Medicine  ** Note not finalized until signed by attending **   --------------------------  Dhruv Radford MD  Internal Medicine   PGY-1  --------------------------    SUBJECTIVE / OVERNIGHT EVENTS:    Pt seen in AM at bedside, resting comfortably in bed, and does not appear to be in any acute distress. When asked, pt denies any recent or active fever, chills, nausea, vomiting, headache, acute sob, chest pain, abdominal pain, genitourinary sx, extremity pain or swelling.    MEDICATIONS  (STANDING):  amLODIPine   Tablet 5 milliGRAM(s) Oral daily  apixaban 10 milliGRAM(s) Oral two times a day  atorvastatin 40 milliGRAM(s) Oral at bedtime  clopidogrel Tablet 75 milliGRAM(s) Oral daily  dextrose 5%. 1000 milliLiter(s) (50 mL/Hr) IV Continuous <Continuous>  dextrose 5%. 1000 milliLiter(s) (100 mL/Hr) IV Continuous <Continuous>  dextrose 50% Injectable 25 Gram(s) IV Push once  dextrose 50% Injectable 12.5 Gram(s) IV Push once  dextrose 50% Injectable 25 Gram(s) IV Push once  gabapentin 100 milliGRAM(s) Oral three times a day  glucagon  Injectable 1 milliGRAM(s) IntraMuscular once  insulin glargine Injectable (LANTUS) 12 Unit(s) SubCutaneous at bedtime  insulin lispro (ADMELOG) corrective regimen sliding scale   SubCutaneous three times a day before meals  insulin lispro (ADMELOG) corrective regimen sliding scale   SubCutaneous at bedtime  insulin lispro Injectable (ADMELOG) 4 Unit(s) SubCutaneous three times a day before meals  lactated ringers. 1000 milliLiter(s) (75 mL/Hr) IV Continuous <Continuous>  metoprolol succinate  milliGRAM(s) Oral daily  Nephro-deep 1 Tablet(s) Oral daily  nystatin Powder 1 Application(s) Topical two times a day  pancrelipase  (CREON  6,000 Lipase Units) 1 Capsule(s) Oral three times a day with meals  pantoprazole    Tablet 40 milliGRAM(s) Oral two times a day  sodium bicarbonate 1300 milliGRAM(s) Oral two times a day    MEDICATIONS  (PRN):  calamine/zinc oxide Lotion 1 Application(s) Topical two times a day PRN Rash and/or Itching  dextrose Oral Gel 15 Gram(s) Oral once PRN Blood Glucose LESS THAN 70 milliGRAM(s)/deciliter    CAPILLARY BLOOD GLUCOSE  POCT Blood Glucose.: 190 mg/dL (03 Mar 2025 12:09)  POCT Blood Glucose.: 131 mg/dL (03 Mar 2025 08:32)  POCT Blood Glucose.: 221 mg/dL (02 Mar 2025 21:17)  POCT Blood Glucose.: 292 mg/dL (02 Mar 2025 16:58)    I&O's Summary    02 Mar 2025 07:01  -  03 Mar 2025 07:00  --------------------------------------------------------  IN: 380 mL / OUT: 1600 mL / NET: -1220 mL    PHYSICAL EXAM:  Vital Signs Last 24 Hrs  T(C): 36.6 (03 Mar 2025 13:00), Max: 36.7 (03 Mar 2025 00:24)  T(F): 97.9 (03 Mar 2025 13:00), Max: 98 (03 Mar 2025 00:24)  HR: 66 (03 Mar 2025 13:00) (66 - 79)  BP: 181/90 (03 Mar 2025 13:00) (160/75 - 188/79)  RR: 18 (03 Mar 2025 13:00) (18 - 18)  SpO2: 98% (03 Mar 2025 13:00) (95% - 98%)    Parameters below as of 03 Mar 2025 13:00  Patient On (Oxygen Delivery Method): room air    CONSTITUTIONAL: NAD; well-developed  HEENT: PERRL, clear conjunctiva  RESPIRATORY: Normal respiratory effort; lungs are clear to auscultation bilaterally; No Crackles/Rhonchi/Wheezing  CARDIOVASCULAR: Regular rate and rhythm, normal S1 and S2, no murmur/rub/gallop; No lower extremity edema; Peripheral pulses are 2+ bilaterally  ABDOMEN: Nontender to palpation, normoactive bowel sounds, no rebound/guarding; No hepatosplenomegaly  MUSCULOSKELETAL: no clubbing or cyanosis of digits; no joint swelling or tenderness to palpation  EXTREMITY: Improved L hand/L wrist/L forearm bruising.  NEURO: A&Ox3; verbal. Conversive.  PSYCH: normal mood; Affect appropirate    LABS:                        11.1   9.12  )-----------( 296      ( 02 Mar 2025 07:13 )             35.4     03-02    142  |  113[H]  |  40[H]  ----------------------------<  99  5.0   |  17[L]  |  2.58[H]    Ca    8.8      02 Mar 2025 07:13  Phos  2.1     03-02  Mg     1.8     03-02    TPro  7.2  /  Alb  3.2[L]  /  TBili  0.4  /  DBili  x   /  AST  17  /  ALT  9[L]  /  AlkPhos  98  03-02    Urinalysis Basic - ( 02 Mar 2025 07:13 )  Color: x / Appearance: x / SG: x / pH: x  Gluc: 99 mg/dL / Ketone: x  / Bili: x / Urobili: x   Blood: x / Protein: x / Nitrite: x   Leuk Esterase: x / RBC: x / WBC x   Sq Epi: x / Non Sq Epi: x / Bacteria: x    RADIOLOGY & ADDITIONAL TESTS:  Results Reviewed: X  Imaging Personally Reviewed: X  Electrocardiogram Personally Reviewed: X

## 2025-03-03 NOTE — ADVANCED PRACTICE NURSE CONSULT - ASSESSMENT
[facility] reviewed & events noted. In at bedside for routine ostomy care follow up. Pt in bed more awake & alert, was having an orange, coughing noted "went on a wrong pipe". Pt recovered from coughing, no s/s of resp distress noted & able to resume sips of water. On assessment, pouching system is leaking. Complete pouching system changed. Stoma 1 1/8"L x 1 3/8" pink& viable, flush with skin, + creases at 3&9 o'clock,  draining yellow liquid stool. Peristomal skin slightly denuded, surrounding skin in LLQ abd w/ scattered rashes which appears fungal. Nystatin powder at bedside, applied & dabbed w/ cavilon 3M no sting barrier wipe to seal & protect. Mucocutaneous junction intact. Stoma powder applied to denuded peristomal skin to absorb moisture & dabbed w/ cavilon 3M to seal & protect skin.  Repouched w/ 2 1/4" soft convex skin barrier, bead of paste applied to skin creases to level surface & at the back of skin barrier to caulk & drainable pouch. Stoma belt applied to add extra support at 3 & 9 o'clock position. Supplies with pattern at bedside.  Discussed POC with staff RN at bedside.

## 2025-03-03 NOTE — PROGRESS NOTE ADULT - ATTENDING COMMENTS
#danuta on ckd  in setting of flu  likely atn   check bladder scan   cont IVF   awaiting cr today  #met acidosis  on sodium bicarb tabs 1300mg po tid  #hyperkalemia  monitor k trend  renal diet  lokelma if needed  #proteinuria  needs gn serology workup  #call if labs urgent today
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
## 2025-03-04 ENCOUNTER — TRANSCRIPTION ENCOUNTER (OUTPATIENT)
Age: 75
End: 2025-03-04

## 2025-03-04 ENCOUNTER — RESULT REVIEW (OUTPATIENT)
Age: 75
End: 2025-03-04

## 2025-03-04 LAB
ALBUMIN SERPL ELPH-MCNC: 3 G/DL — LOW (ref 3.3–5)
ALP SERPL-CCNC: 95 U/L — SIGNIFICANT CHANGE UP (ref 40–120)
ALT FLD-CCNC: 13 U/L — SIGNIFICANT CHANGE UP (ref 10–45)
ANION GAP SERPL CALC-SCNC: 11 MMOL/L — SIGNIFICANT CHANGE UP (ref 5–17)
AST SERPL-CCNC: 17 U/L — SIGNIFICANT CHANGE UP (ref 10–40)
BASOPHILS # BLD AUTO: 0.07 K/UL — SIGNIFICANT CHANGE UP (ref 0–0.2)
BASOPHILS NFR BLD AUTO: 0.8 % — SIGNIFICANT CHANGE UP (ref 0–2)
BILIRUB SERPL-MCNC: 0.3 MG/DL — SIGNIFICANT CHANGE UP (ref 0.2–1.2)
BUN SERPL-MCNC: 36 MG/DL — HIGH (ref 7–23)
CALCIUM SERPL-MCNC: 8.6 MG/DL — SIGNIFICANT CHANGE UP (ref 8.4–10.5)
CHLORIDE SERPL-SCNC: 108 MMOL/L — SIGNIFICANT CHANGE UP (ref 96–108)
CO2 SERPL-SCNC: 20 MMOL/L — LOW (ref 22–31)
CREAT SERPL-MCNC: 2.08 MG/DL — HIGH (ref 0.5–1.3)
EGFR: 24 ML/MIN/1.73M2 — LOW
EGFR: 24 ML/MIN/1.73M2 — LOW
EOSINOPHIL # BLD AUTO: 0.32 K/UL — SIGNIFICANT CHANGE UP (ref 0–0.5)
EOSINOPHIL NFR BLD AUTO: 3.6 % — SIGNIFICANT CHANGE UP (ref 0–6)
GLUCOSE BLDC GLUCOMTR-MCNC: 109 MG/DL — HIGH (ref 70–99)
GLUCOSE BLDC GLUCOMTR-MCNC: 143 MG/DL — HIGH (ref 70–99)
GLUCOSE BLDC GLUCOMTR-MCNC: 203 MG/DL — HIGH (ref 70–99)
GLUCOSE BLDC GLUCOMTR-MCNC: 225 MG/DL — HIGH (ref 70–99)
GLUCOSE SERPL-MCNC: 135 MG/DL — HIGH (ref 70–99)
HCT VFR BLD CALC: 32.4 % — LOW (ref 34.5–45)
HGB BLD-MCNC: 10.1 G/DL — LOW (ref 11.5–15.5)
IMM GRANULOCYTES NFR BLD AUTO: 1 % — HIGH (ref 0–0.9)
LYMPHOCYTES # BLD AUTO: 1.76 K/UL — SIGNIFICANT CHANGE UP (ref 1–3.3)
LYMPHOCYTES # BLD AUTO: 20 % — SIGNIFICANT CHANGE UP (ref 13–44)
MAGNESIUM SERPL-MCNC: 1.5 MG/DL — LOW (ref 1.6–2.6)
MCHC RBC-ENTMCNC: 30.4 PG — SIGNIFICANT CHANGE UP (ref 27–34)
MCHC RBC-ENTMCNC: 31.2 G/DL — LOW (ref 32–36)
MCV RBC AUTO: 97.6 FL — SIGNIFICANT CHANGE UP (ref 80–100)
MONOCYTES # BLD AUTO: 0.77 K/UL — SIGNIFICANT CHANGE UP (ref 0–0.9)
MONOCYTES NFR BLD AUTO: 8.8 % — SIGNIFICANT CHANGE UP (ref 2–14)
NEUTROPHILS # BLD AUTO: 5.78 K/UL — SIGNIFICANT CHANGE UP (ref 1.8–7.4)
NEUTROPHILS NFR BLD AUTO: 65.8 % — SIGNIFICANT CHANGE UP (ref 43–77)
NRBC BLD AUTO-RTO: 0 /100 WBCS — SIGNIFICANT CHANGE UP (ref 0–0)
PHOSPHATE SERPL-MCNC: 3.8 MG/DL — SIGNIFICANT CHANGE UP (ref 2.5–4.5)
PLATELET # BLD AUTO: 242 K/UL — SIGNIFICANT CHANGE UP (ref 150–400)
POTASSIUM SERPL-MCNC: 4.8 MMOL/L — SIGNIFICANT CHANGE UP (ref 3.5–5.3)
POTASSIUM SERPL-SCNC: 4.8 MMOL/L — SIGNIFICANT CHANGE UP (ref 3.5–5.3)
PROT SERPL-MCNC: 6.6 G/DL — SIGNIFICANT CHANGE UP (ref 6–8.3)
RBC # BLD: 3.32 M/UL — LOW (ref 3.8–5.2)
RBC # FLD: 13.8 % — SIGNIFICANT CHANGE UP (ref 10.3–14.5)
SODIUM SERPL-SCNC: 139 MMOL/L — SIGNIFICANT CHANGE UP (ref 135–145)
WBC # BLD: 8.79 K/UL — SIGNIFICANT CHANGE UP (ref 3.8–10.5)
WBC # FLD AUTO: 8.79 K/UL — SIGNIFICANT CHANGE UP (ref 3.8–10.5)

## 2025-03-04 PROCEDURE — 93308 TTE F-UP OR LMTD: CPT | Mod: 26

## 2025-03-04 PROCEDURE — 99232 SBSQ HOSP IP/OBS MODERATE 35: CPT | Mod: GC

## 2025-03-04 PROCEDURE — 93321 DOPPLER ECHO F-UP/LMTD STD: CPT | Mod: 26

## 2025-03-04 PROCEDURE — 99232 SBSQ HOSP IP/OBS MODERATE 35: CPT

## 2025-03-04 RX ORDER — LOPERAMIDE HCL 1 MG/7.5ML
15 SOLUTION ORAL
Refills: 0 | DISCHARGE

## 2025-03-04 RX ORDER — NYSTATIN 100000 [USP'U]/G
1 CREAM TOPICAL
Qty: 0 | Refills: 0 | DISCHARGE
Start: 2025-03-04

## 2025-03-04 RX ORDER — ONDANSETRON HCL/PF 4 MG/2 ML
4 VIAL (ML) INJECTION ONCE
Refills: 0 | Status: COMPLETED | OUTPATIENT
Start: 2025-03-04 | End: 2025-03-04

## 2025-03-04 RX ORDER — ONDANSETRON HCL/PF 4 MG/2 ML
1 VIAL (ML) INJECTION
Refills: 0 | DISCHARGE

## 2025-03-04 RX ORDER — CALAMINE 8% AND ZINC OXIDE 8% 160 MG/ML
1 LOTION TOPICAL
Qty: 0 | Refills: 0 | DISCHARGE
Start: 2025-03-04

## 2025-03-04 RX ORDER — CEFEPIME 2 G/20ML
2 INJECTION, POWDER, FOR SOLUTION INTRAVENOUS
Refills: 0 | DISCHARGE

## 2025-03-04 RX ORDER — APIXABAN 2.5 MG/1
2 TABLET, FILM COATED ORAL
Qty: 0 | Refills: 0 | DISCHARGE
Start: 2025-03-04

## 2025-03-04 RX ORDER — ACETAMINOPHEN/DEXTROMETHORPHAN 325MG-15MG
0 CAPSULE ORAL
Refills: 0 | DISCHARGE

## 2025-03-04 RX ORDER — MAGNESIUM SULFATE 500 MG/ML
2 SYRINGE (ML) INJECTION ONCE
Refills: 0 | Status: COMPLETED | OUTPATIENT
Start: 2025-03-04 | End: 2025-03-04

## 2025-03-04 RX ORDER — ASPIRIN 325 MG
1 TABLET ORAL
Refills: 0 | DISCHARGE

## 2025-03-04 RX ADMIN — ATORVASTATIN CALCIUM 40 MILLIGRAM(S): 80 TABLET, FILM COATED ORAL at 21:59

## 2025-03-04 RX ADMIN — Medication 40 MILLIGRAM(S): at 17:19

## 2025-03-04 RX ADMIN — INSULIN LISPRO 4 UNIT(S): 100 INJECTION, SOLUTION INTRAVENOUS; SUBCUTANEOUS at 08:36

## 2025-03-04 RX ADMIN — Medication 1 CAPSULE(S): at 08:33

## 2025-03-04 RX ADMIN — INSULIN LISPRO 4: 100 INJECTION, SOLUTION INTRAVENOUS; SUBCUTANEOUS at 12:13

## 2025-03-04 RX ADMIN — INSULIN GLARGINE-YFGN 12 UNIT(S): 100 INJECTION, SOLUTION SUBCUTANEOUS at 22:00

## 2025-03-04 RX ADMIN — APIXABAN 10 MILLIGRAM(S): 2.5 TABLET, FILM COATED ORAL at 06:15

## 2025-03-04 RX ADMIN — NYSTATIN 1 APPLICATION(S): 100000 CREAM TOPICAL at 17:20

## 2025-03-04 RX ADMIN — NYSTATIN 1 APPLICATION(S): 100000 CREAM TOPICAL at 06:16

## 2025-03-04 RX ADMIN — INSULIN LISPRO 4 UNIT(S): 100 INJECTION, SOLUTION INTRAVENOUS; SUBCUTANEOUS at 17:20

## 2025-03-04 RX ADMIN — Medication 25 GRAM(S): at 08:36

## 2025-03-04 RX ADMIN — GABAPENTIN 100 MILLIGRAM(S): 400 CAPSULE ORAL at 06:16

## 2025-03-04 RX ADMIN — Medication 4 MILLIGRAM(S): at 12:13

## 2025-03-04 RX ADMIN — Medication 1 CAPSULE(S): at 17:20

## 2025-03-04 RX ADMIN — Medication 1300 MILLIGRAM(S): at 06:15

## 2025-03-04 RX ADMIN — Medication 1300 MILLIGRAM(S): at 17:19

## 2025-03-04 RX ADMIN — METOPROLOL SUCCINATE 100 MILLIGRAM(S): 50 TABLET, EXTENDED RELEASE ORAL at 06:15

## 2025-03-04 RX ADMIN — GABAPENTIN 100 MILLIGRAM(S): 400 CAPSULE ORAL at 21:59

## 2025-03-04 RX ADMIN — GABAPENTIN 100 MILLIGRAM(S): 400 CAPSULE ORAL at 13:12

## 2025-03-04 RX ADMIN — Medication 40 MILLIGRAM(S): at 06:16

## 2025-03-04 RX ADMIN — AMLODIPINE BESYLATE 10 MILLIGRAM(S): 10 TABLET ORAL at 06:16

## 2025-03-04 RX ADMIN — CLOPIDOGREL BISULFATE 75 MILLIGRAM(S): 75 TABLET, FILM COATED ORAL at 08:34

## 2025-03-04 RX ADMIN — APIXABAN 10 MILLIGRAM(S): 2.5 TABLET, FILM COATED ORAL at 17:19

## 2025-03-04 RX ADMIN — INSULIN LISPRO 4: 100 INJECTION, SOLUTION INTRAVENOUS; SUBCUTANEOUS at 17:20

## 2025-03-04 RX ADMIN — Medication 1 TABLET(S): at 08:33
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
## 2025-03-04 NOTE — PROGRESS NOTE ADULT - SUBJECTIVE AND OBJECTIVE BOX
Dhruv Radford M.D.   PGY-1 Internal Medicine  ** Note not finalized until signed by attending **   --------------------------  Dhruv Radford MD  Internal Medicine   PGY-1  --------------------------    SUBJECTIVE / OVERNIGHT EVENTS:    Pt seen in AM at bedside, resting comfortably in bed, and does not appear to be in any acute distress. Reporting some new nausea this AM. When asked, pt denies any recent or active fever, chills, vomiting, headache, acute sob, chest pain, abdominal pain, genitourinary sx, extremity pain or swelling.    MEDICATIONS  (STANDING):  amLODIPine   Tablet 10 milliGRAM(s) Oral daily  apixaban 10 milliGRAM(s) Oral two times a day  atorvastatin 40 milliGRAM(s) Oral at bedtime  clopidogrel Tablet 75 milliGRAM(s) Oral daily  dextrose 5%. 1000 milliLiter(s) (100 mL/Hr) IV Continuous <Continuous>  dextrose 5%. 1000 milliLiter(s) (50 mL/Hr) IV Continuous <Continuous>  dextrose 50% Injectable 25 Gram(s) IV Push once  dextrose 50% Injectable 12.5 Gram(s) IV Push once  dextrose 50% Injectable 25 Gram(s) IV Push once  gabapentin 100 milliGRAM(s) Oral three times a day  glucagon  Injectable 1 milliGRAM(s) IntraMuscular once  insulin glargine Injectable (LANTUS) 12 Unit(s) SubCutaneous at bedtime  insulin lispro (ADMELOG) corrective regimen sliding scale   SubCutaneous three times a day before meals  insulin lispro (ADMELOG) corrective regimen sliding scale   SubCutaneous at bedtime  insulin lispro Injectable (ADMELOG) 4 Unit(s) SubCutaneous three times a day before meals  metoprolol succinate  milliGRAM(s) Oral daily  Nephro-deep 1 Tablet(s) Oral daily  nystatin Powder 1 Application(s) Topical two times a day  pancrelipase  (CREON  6,000 Lipase Units) 1 Capsule(s) Oral three times a day with meals  pantoprazole    Tablet 40 milliGRAM(s) Oral two times a day  sodium bicarbonate 1300 milliGRAM(s) Oral two times a day    MEDICATIONS  (PRN):  calamine/zinc oxide Lotion 1 Application(s) Topical two times a day PRN Rash and/or Itching  dextrose Oral Gel 15 Gram(s) Oral once PRN Blood Glucose LESS THAN 70 milliGRAM(s)/deciliter    CAPILLARY BLOOD GLUCOSE    POCT Blood Glucose.: 225 mg/dL (04 Mar 2025 11:26)  POCT Blood Glucose.: 143 mg/dL (04 Mar 2025 07:47)  POCT Blood Glucose.: 215 mg/dL (03 Mar 2025 22:48)  POCT Blood Glucose.: 209 mg/dL (03 Mar 2025 21:09)  POCT Blood Glucose.: 172 mg/dL (03 Mar 2025 18:33)  POCT Blood Glucose.: 162 mg/dL (03 Mar 2025 17:15)  POCT Blood Glucose.: 229 mg/dL (03 Mar 2025 16:18)    I&O's Summary      03 Mar 2025 07:01  -  04 Mar 2025 07:00  --------------------------------------------------------  IN: 0 mL / OUT: 1720 mL / NET: -1720 mL  PHYSICAL EXAM:  Vital Signs Last 24 Hrs  T(C): 36.7 (04 Mar 2025 12:02), Max: 36.7 (03 Mar 2025 20:30)  T(F): 98.1 (04 Mar 2025 12:02), Max: 98.1 (04 Mar 2025 12:02)  HR: 64 (04 Mar 2025 12:02) (64 - 82)  BP: 158/76 (04 Mar 2025 12:02) (155/73 - 181/90)  RR: 18 (04 Mar 2025 12:02) (18 - 18)  SpO2: 92% (04 Mar 2025 12:02) (92% - 98%)    Parameters below as of 04 Mar 2025 12:02  Patient On (Oxygen Delivery Method): room air    CONSTITUTIONAL: NAD; well-developed  HEENT: PERRL, clear conjunctiva  RESPIRATORY: Normal respiratory effort; lungs are clear to auscultation bilaterally; No Crackles/Rhonchi/Wheezing  CARDIOVASCULAR: Regular rate and rhythm, normal S1 and S2, no murmur/rub/gallop; No lower extremity edema; Peripheral pulses are 2+ bilaterally  ABDOMEN: +Ostomy in place; Nontender to palpation, normoactive bowel sounds, no rebound/guarding; No hepatosplenomegaly  MUSCULOSKELETAL: no clubbing or cyanosis of digits; no joint swelling or tenderness to palpation  EXT: +Tender to palpation LLE below knee.  NEURO: A&Ox3; verbal. Conversive.  PSYCH: normal mood; Affect appropriate    LABS:                        10.1   8.79  )-----------( 242      ( 04 Mar 2025 07:12 )             32.4     03-04    139  |  108  |  36[H]  ----------------------------<  135[H]  4.8   |  20[L]  |  2.08[H]    Ca    8.6      04 Mar 2025 07:09  Phos  3.8     03-04  Mg     1.5     03-04    TPro  6.6  /  Alb  3.0[L]  /  TBili  0.3  /  DBili  x   /  AST  17  /  ALT  13  /  AlkPhos  95  03-04    Urinalysis Basic - ( 04 Mar 2025 07:09 )  Color: x / Appearance: x / SG: x / pH: x  Gluc: 135 mg/dL / Ketone: x  / Bili: x / Urobili: x   Blood: x / Protein: x / Nitrite: x   Leuk Esterase: x / RBC: x / WBC x   Sq Epi: x / Non Sq Epi: x / Bacteria: x    RADIOLOGY & ADDITIONAL TESTS:  Results Reviewed: X  Imaging Personally Reviewed: X  Electrocardiogram Personally Reviewed: X

## 2025-03-04 NOTE — DISCHARGE NOTE PROVIDER - NSDCCPTREATMENT_GEN_ALL_CORE_FT
PRINCIPAL PROCEDURE  Procedure: VS vein duplex scan BILAT LE  Findings and Treatment: 2/28/2025  IMPRESSION:  Acute deep venous thrombosis: below the knee.  DVT within the left gastrocnemius vein.        SECONDARY PROCEDURE  Procedure: US kidney complete  Findings and Treatment: 03/01/2025  IMPRESSION:  No hydronephrosis.  Bilateral renal cysts.      Procedure: Modified barium swallow  Findings and Treatment: 02/27/2025  IMPRESSION:  Penetration is seen with thin liquids. No aspiration is   seen. A full report will be issued by the department of speech and   hearing.       PRINCIPAL PROCEDURE  Procedure: VS vein duplex scan BILAT LE  Findings and Treatment: 2/28/2025  IMPRESSION:  Acute deep venous thrombosis: below the knee.  DVT within the left gastrocnemius vein.        SECONDARY PROCEDURE  Procedure: US kidney complete  Findings and Treatment: 03/01/2025  IMPRESSION:  No hydronephrosis.  Bilateral renal cysts.      Procedure: Modified barium swallow  Findings and Treatment: 02/27/2025  IMPRESSION:  Penetration is seen with thin liquids. No aspiration is   seen. A full report will be issued by the department of speech and   hearing.      Procedure: XR wrist LT 3V  Findings and Treatment: 02/26/2025  IMPRESSION:  No fractures or dislocations.  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 Relatively preserved remaining joint spaces   and no suspected inflammatory joint margin erosive changes.  Carpal bones normally aligned.  Neutral ulnar variance.  Generalized osteopenia otherwise no discrete lytic or blastic lesions.      Procedure: CT abdomen and pelvis  Findings and Treatment: 2/23/2025  IMPRESSION:  Confluent consolidative opacity in the left lower lobe, cannot exclude   left basilar pneumonia. Additional scattered groundglass and tree-in-bud   opacities bilaterally.  No bowel obstruction.      Procedure: Head CT  Findings and Treatment: 2/23/2025  IMPRESSION:  Very limited evaluation due to motion degradation artifact.  Visualized portions demonstrate no acute intracranial hemorrhage, mass effect, or midline shift.

## 2025-03-04 NOTE — PROGRESS NOTE ADULT - ATTENDING COMMENTS
#danuta on ckd  in setting of flu  likely atn   cr downtrending  cont IVF   #met acidosis  on sodium bicarb tabs 1300mg po tid  #hyperkalemia  monitor k trend  low k diet, renal diet suggested  lokelma if needed  #proteinuria  needs gn serology workup

## 2025-03-04 NOTE — DISCHARGE NOTE PROVIDER - NSDCMRMEDTOKEN_GEN_ALL_CORE_FT
apixaban 5 mg oral tablet: 2 tab(s) orally 2 times a day  calamine topical lotion: 1 Apply topically to affected area 2 times a day As needed Rash and/or Itching  gabapentin 100 mg oral capsule: 1 cap(s) orally 3 times a day  Insulin Aspart-Insulin Aspart Protamine: 20 unit(s) subcutaneous 2 times a day at 11:30AM and 4:30PM  Lasix 40 mg oral tablet: 1 tab(s) orally 2 times a day  Lipitor 10 mg oral tablet: 1 tab(s) orally once a day (at bedtime)  losartan 25 mg oral tablet: 1 tab(s) orally once a day  metoprolol succinate 100 mg oral tablet, extended release: 1 tab(s) orally once a day  nystatin 100,000 units/g topical powder: 1 Apply topically to affected area 2 times a day  Pepcid 20 mg oral tablet: 1 tab(s) orally once a day  Plavix 75 mg oral tablet: 1 tab(s) orally once a day  senna (sennosides) 8.6 mg oral tablet: 1 tab(s) orally  Zenpep 5000 units-17,000 units-27,000 units oral delayed release capsule: 1 cap(s) orally 3 times a day   Admelog 100 units/mL injectable solution: 1 unit(s) injectable 2 times a day (before meals) Low Dose Insulin Sliding  Scale  amLODIPine 10 mg oral tablet: 1 tab(s) orally once a day  apixaban 5 mg oral tablet: 2 tab(s) orally 2 times a day  calamine topical lotion: 1 Apply topically to affected area 2 times a day As needed Rash and/or Itching  gabapentin 100 mg oral capsule: 1 cap(s) orally 3 times a day  insulin glargine 100 units/mL subcutaneous solution: 12 unit(s) subcutaneous once a day (at bedtime)  insulin lispro 100 units/mL injectable solution: 4 unit(s) injectable 3 times a day (before meals)  insulin lispro 100 units/mL injectable solution: 1 unit(s) injectable once a day (at bedtime) Low Dose Insulin Sliding  Scale  Lipitor 10 mg oral tablet: 1 tab(s) orally once a day (at bedtime)  metoprolol succinate 100 mg oral tablet, extended release: 1 tab(s) orally once a day  nystatin 100,000 units/g topical powder: 1 Apply topically to affected area 2 times a day  Pepcid 20 mg oral tablet: 1 tab(s) orally once a day  Plavix 75 mg oral tablet: 1 tab(s) orally once a day  senna (sennosides) 8.6 mg oral tablet: 1 tab(s) orally  sodium bicarbonate 650 mg oral tablet: 2 tab(s) orally 2 times a day  Zenpep 5000 units-17,000 units-27,000 units oral delayed release capsule: 1 cap(s) orally 3 times a day

## 2025-03-04 NOTE — DISCHARGE NOTE PROVIDER - CARE PROVIDER_API CALL
Rianna Barrera  Internal Medicine  [address] [place] [id number]  Phone: [phone]  Fax: [phone]  Follow Up Time: 2 weeks

## 2025-03-04 NOTE — DISCHARGE NOTE PROVIDER - NSDCCPCAREPLAN_GEN_ALL_CORE_FT
PRINCIPAL DISCHARGE DIAGNOSIS  Diagnosis: Toxic encephalopathy  Assessment and Plan of Treatment: You were admitted o the hospital for altered metnal status. When you came in, you were unable to answer questions like your name, the date, or where you were. Based on the CAT Scan of your chest, as well as the urine samples that were tested, it appears that this may have been because of either a pneumonia, UTI, Influenza, or a combination of all of these illnesses. You were started on IV antibiotics while in the hospital. On hospital day 2, you altered mental status significantly improved. You completed a 5x day course of antibiotics, appropriate for the infectious you were deemed to have.   Of note, your kidney numbers kaylene while inpatient. You were seen by the kidney doctors who treated these numbers with fluids and sodium bicarb. Moreover, you were found to have a blood clot in your L lower leg. You were started on blood thinners for this clot.  Please follow-up with your PCP for further management of your blood clot, kidney numbers, and to assure that your infectious have since resolved.   Please seek medical attention if you are having fevers, chills, shortness of breath, or altered mental status.   It was a pleasure caring for you while you were here.     PRINCIPAL DISCHARGE DIAGNOSIS  Diagnosis: Toxic encephalopathy  Assessment and Plan of Treatment: You were admitted o the hospital for altered metnal status. When you came in, you were unable to answer questions like your name, the date, or where you were. Based on the CAT Scan of your chest, as well as the urine samples that were tested, it appears that this may have been because of either a pneumonia, UTI, Influenza, or a combination of all of these illnesses. You were started on IV antibiotics while in the hospital. On hospital day 2, you altered mental status significantly improved. You completed a 5x day course of antibiotics, appropriate for the infectious you were deemed to have.   Of note, your kidney numbers kaylene while inpatient. You were seen by the kidney doctors who treated these numbers with fluids and sodium bicarb. Moreover, you were found to have a blood clot in your L lower leg. You were started on blood thinners for this clot.  Please follow-up with your PCP for further management of your blood clot, kidney numbers, and to assure that your infectious have since resolved.   Please seek medical attention if you are having fevers, chills, shortness of breath, or altered mental status.   It was a pleasure caring for you while you were here.      SECONDARY DISCHARGE DIAGNOSES  Diagnosis: CKD (chronic kidney disease)  Assessment and Plan of Treatment: You had an acute kidney injury on top of the chronic kidney disease you have. You were followed by nephrology while you were here and treated appropriately.   You were discontinued on your old BP meds and restarted on a new medication to help you blood pressure, thatr should be safe for your kidneys.    Diagnosis: HTN (hypertension)  Assessment and Plan of Treatment: You have a history of high blood pressure. We had to stop the blood pressure medications you were being given as these can damage the kidneys. We started you on a new medication -> Amlodipine 10 mg once / day.    Diagnosis: Diabetes type 2  Assessment and Plan of Treatment: You have type 2 diabetes. Your insulin regimen was up-titrated as needed. Please see the medication reconcillation for the [facility] facility to know how to dose your insulin    Diagnosis: DVT, lower extremity  Assessment and Plan of Treatment: You were diagnosed with a blood clot of your left lower leg.  You were started on a blood thinner, Eliquis. This was started on 03/03.   From 03/03/2025 to 03/09/2025, you will take Eliquis 10 mg twice a day. After, starting 03/10/2025 and for indefinite thereafter, please take Eliquis 5 mg twice a day until directed to stop by your PCP.    Diagnosis: Hypernatremia  Assessment and Plan of Treatment: You were found to have elevated sodium. We gave you fluids through the IV. Your sodium improved to normal range.     PRINCIPAL DISCHARGE DIAGNOSIS  Diagnosis: Toxic encephalopathy  Assessment and Plan of Treatment: You were admitted o the hospital for altered metnal status. When you came in, you were unable to answer questions like your name, the date, or where you were. Based on the CAT Scan of your chest, as well as the urine samples that were tested, it appears that this may have been because of either a pneumonia, UTI, Influenza, or a combination of all of these illnesses. You were started on IV antibiotics while in the hospital. On hospital day 2, you altered mental status significantly improved. You completed a 5x day course of antibiotics, appropriate for the infectious you were deemed to have.   Of note, your kidney numbers kaylene while inpatient. You were seen by the kidney doctors who treated these numbers with fluids and sodium bicarb. Moreover, you were found to have a blood clot in your L lower leg. You were started on blood thinners for this clot.  Please follow-up with your PCP for further management of your blood clot, kidney numbers, and to assure that your infectious have since resolved.   Please seek medical attention if you are having fevers, chills, shortness of breath, or altered mental status.   It was a pleasure caring for you while you were here.      SECONDARY DISCHARGE DIAGNOSES  Diagnosis: CKD (chronic kidney disease)  Assessment and Plan of Treatment: You had an acute kidney injury on top of the chronic kidney disease you have. You were followed by nephrology while you were here and treated appropriately.   You were discontinued on your old BP meds and restarted on a new medication to help you blood pressure, thatr should be safe for your kidneys.    Diagnosis: HTN (hypertension)  Assessment and Plan of Treatment: You have a history of high blood pressure. We had to stop the blood pressure medications you were being given as these can damage the kidneys. We started you on a new medication -> Amlodipine 10 mg once / day.    Diagnosis: Diabetes type 2  Assessment and Plan of Treatment: You have type 2 diabetes. Your insulin regimen was up-titrated as needed. Please see the medication reconcillation for the [facility] [place] facility to know how to dose your insulin    Diagnosis: DVT, lower extremity  Assessment and Plan of Treatment: You were diagnosed with a blood clot of your left lower leg.  You were started on a blood thinner, Eliquis. This was started on 03/03.   From 03/03/2025 to 03/09/2025, you will take Eliquis 10 mg twice a day. After, starting 03/10/2025 and for indefinite thereafter, please take Eliquis 5 mg twice a day until directed to stop by your PCP.    Diagnosis: Hypernatremia  Assessment and Plan of Treatment: You were found to have elevated sodium. We gave you fluids through the IV. Your sodium improved to normal range.  Please get a repeat BMP on Monday, 03/10 to check for Na+, K+, sCr.

## 2025-03-04 NOTE — PROGRESS NOTE ADULT - SUBJECTIVE AND OBJECTIVE BOX
[facility] DIVISION OF KIDNEY DISEASES AND HYPERTENSION -- FOLLOW UP NOTE  --------------------------------------------------------------------------------  Chief Complaint:/subjective:  no complaints    24 hour events:  k elevated, now ok       PAST HISTORY  --------------------------------------------------------------------------------  No significant changes to PMH, PSH, FHx, SHx, unless otherwise noted    ALLERGIES & MEDICATIONS  --------------------------------------------------------------------------------  Allergies    dexamethasone (Unknown)  Wellbutrin SR (Unknown)  contrast dye (Unknown)  tramadol (Unknown)  glipiZIDE (Unknown)  morphine (Unknown)    Intolerances      Standing Inpatient Medications  amLODIPine   Tablet 10 milliGRAM(s) Oral daily  apixaban 10 milliGRAM(s) Oral two times a day  atorvastatin 40 milliGRAM(s) Oral at bedtime  clopidogrel Tablet 75 milliGRAM(s) Oral daily  dextrose 5%. 1000 milliLiter(s) IV Continuous <Continuous>  dextrose 5%. 1000 milliLiter(s) IV Continuous <Continuous>  dextrose 50% Injectable 25 Gram(s) IV Push once  dextrose 50% Injectable 12.5 Gram(s) IV Push once  dextrose 50% Injectable 25 Gram(s) IV Push once  gabapentin 100 milliGRAM(s) Oral three times a day  glucagon  Injectable 1 milliGRAM(s) IntraMuscular once  insulin glargine Injectable (LANTUS) 12 Unit(s) SubCutaneous at bedtime  insulin lispro (ADMELOG) corrective regimen sliding scale   SubCutaneous three times a day before meals  insulin lispro (ADMELOG) corrective regimen sliding scale   SubCutaneous at bedtime  insulin lispro Injectable (ADMELOG) 4 Unit(s) SubCutaneous three times a day before meals  metoprolol succinate  milliGRAM(s) Oral daily  Nephro-deep 1 Tablet(s) Oral daily  nystatin Powder 1 Application(s) Topical two times a day  pancrelipase  (CREON  6,000 Lipase Units) 1 Capsule(s) Oral three times a day with meals  pantoprazole    Tablet 40 milliGRAM(s) Oral two times a day  sodium bicarbonate 1300 milliGRAM(s) Oral two times a day    PRN Inpatient Medications  calamine/zinc oxide Lotion 1 Application(s) Topical two times a day PRN  dextrose Oral Gel 15 Gram(s) Oral once PRN      REVIEW OF SYSTEMS  --------------------------------------------------------------------------------  Gen: No weight changes, fatigue, fevers/chills, weakness  Skin: No rashes  Head/Eyes/Ears/Mouth: No headache;   Respiratory: No dyspnea, cough  CV: No chest pain, PND, orthopnea  GI: No abdominal pain, diarrhea, constipation, nausea, vomiting  : No increased frequency, dysuria, hematuria, nocturia  MSK: No joint pain/swelling; no back pain; no edema  Neuro: No dizziness/lightheadedness, weakness  Heme: No easy bruising or bleeding  Psych: No significant nervousness, anxiety, stress, depression    All other systems were reviewed and are negative, except as noted.    VITALS/PHYSICAL EXAM  --------------------------------------------------------------------------------  T(C): 36.7 (03-04-25 @ 12:02), Max: 36.7 (03-03-25 @ 20:30)  HR: 64 (03-04-25 @ 12:02) (64 - 82)  BP: 158/76 (03-04-25 @ 12:02) (155/73 - 169/74)  RR: 18 (03-04-25 @ 12:02) (18 - 18)  SpO2: 92% (03-04-25 @ 12:02) (92% - 95%)  Wt(kg): --  Adult Advanced Hemodynamics Last 24 Hrs  ABP: --  ABP(mean): --  CVP(mm Hg): --  CO: --  CI: --  PA: --  PA(mean): --  PCWP: --  SVR: --  SVRI: --        03-03-25 @ 07:01  -  03-04-25 @ 07:00  --------------------------------------------------------  IN: 0 mL / OUT: 1720 mL / NET: -1720 mL      Physical Exam:               Gen: NAD,    	HEENT:   no jvp  	Pulm: CTA B/L  	CV: RRR, S1S2; no rub  	Back:   no sacral edema  	Abd: +BS, soft,    	: No suprapubic tenderness  	Ext: trace LE edema  	Neuro: awake  	Psych: alert  	Skin: Warm,   	Vascular access:      	LABS/STUDIES  --------------------------------------------------------------------------------              10.1   8.79  >-----------<  242      [03-04-25 @ 07:12]              32.4     Hemoglobin: 10.1 g/dL (03-04-25 @ 07:12)  Hemoglobin: 11.2 g/dL (03-03-25 @ 15:15)    Platelet Count - Automated: 242 K/uL (03-04-25 @ 07:12)  Platelet Count - Automated: 301 K/uL (03-03-25 @ 15:15)    139  |  108  |  36  ----------------------------<  135      [03-04-25 @ 07:09]  4.8   |  20  |  2.08        Ca     8.6     [03-04-25 @ 07:09]      Mg     1.5     [03-04-25 @ 07:09]      Phos  3.8     [03-04-25 @ 07:09]    TPro  6.6  /  Alb  3.0  /  TBili  0.3  /  DBili  x   /  AST  17  /  ALT  13  /  AlkPhos  95  [03-04-25 @ 07:09]          Creatinine Trend:  SCr 2.08 [03-04 @ 07:09]  SCr 2.09 [03-03 @ 20:40]  SCr 2.21 [03-03 @ 15:15]  SCr 2.58 [03-02 @ 07:13]  SCr 3.10 [03-01 @ 07:27]    Urinalysis - [03-04-25 @ 07:09]      Color  / Appearance  / SG  / pH       Gluc 135 / Ketone   / Bili  / Urobili        Blood  / Protein  / Leuk Est  / Nitrite       RBC  / WBC  / Hyaline  / Gran  / Sq Epi  / Non Sq Epi  / Bacteria     Urine Creatinine 294      [02-27-25 @ 08:43]  Urine Protein 585      [02-27-25 @ 08:43]  Urine Sodium 53      [02-27-25 @ 08:43]  Urine Urea Nitrogen 806      [02-27-25 @ 08:43]  Urine Potassium 70      [02-27-25 @ 08:43]  Urine Osmolality 640      [02-27-25 @ 08:43]

## 2025-03-05 ENCOUNTER — TRANSCRIPTION ENCOUNTER (OUTPATIENT)
Age: 75
End: 2025-03-05

## 2025-03-05 VITALS
SYSTOLIC BLOOD PRESSURE: 144 MMHG | OXYGEN SATURATION: 91 % | HEART RATE: 73 BPM | RESPIRATION RATE: 18 BRPM | DIASTOLIC BLOOD PRESSURE: 73 MMHG | TEMPERATURE: 98 F

## 2025-03-05 LAB
ALBUMIN SERPL ELPH-MCNC: 3.1 G/DL — LOW (ref 3.3–5)
ALP SERPL-CCNC: 102 U/L — SIGNIFICANT CHANGE UP (ref 40–120)
ALT FLD-CCNC: 14 U/L — SIGNIFICANT CHANGE UP (ref 10–45)
ANION GAP SERPL CALC-SCNC: 15 MMOL/L — SIGNIFICANT CHANGE UP (ref 5–17)
AST SERPL-CCNC: 27 U/L — SIGNIFICANT CHANGE UP (ref 10–40)
BASOPHILS # BLD AUTO: 0.07 K/UL — SIGNIFICANT CHANGE UP (ref 0–0.2)
BASOPHILS NFR BLD AUTO: 0.7 % — SIGNIFICANT CHANGE UP (ref 0–2)
BILIRUB SERPL-MCNC: 0.6 MG/DL — SIGNIFICANT CHANGE UP (ref 0.2–1.2)
BUN SERPL-MCNC: 35 MG/DL — HIGH (ref 7–23)
CALCIUM SERPL-MCNC: 8.7 MG/DL — SIGNIFICANT CHANGE UP (ref 8.4–10.5)
CHLORIDE SERPL-SCNC: 107 MMOL/L — SIGNIFICANT CHANGE UP (ref 96–108)
CO2 SERPL-SCNC: 19 MMOL/L — LOW (ref 22–31)
CREAT SERPL-MCNC: 2.09 MG/DL — HIGH (ref 0.5–1.3)
EGFR: 24 ML/MIN/1.73M2 — LOW
EGFR: 24 ML/MIN/1.73M2 — LOW
EOSINOPHIL # BLD AUTO: 0.33 K/UL — SIGNIFICANT CHANGE UP (ref 0–0.5)
EOSINOPHIL NFR BLD AUTO: 3.5 % — SIGNIFICANT CHANGE UP (ref 0–6)
GLUCOSE BLDC GLUCOMTR-MCNC: 130 MG/DL — HIGH (ref 70–99)
GLUCOSE BLDC GLUCOMTR-MCNC: 183 MG/DL — HIGH (ref 70–99)
GLUCOSE SERPL-MCNC: 100 MG/DL — HIGH (ref 70–99)
HCT VFR BLD CALC: 36 % — SIGNIFICANT CHANGE UP (ref 34.5–45)
HGB BLD-MCNC: 11.1 G/DL — LOW (ref 11.5–15.5)
IMM GRANULOCYTES NFR BLD AUTO: 0.7 % — SIGNIFICANT CHANGE UP (ref 0–0.9)
LYMPHOCYTES # BLD AUTO: 1.77 K/UL — SIGNIFICANT CHANGE UP (ref 1–3.3)
LYMPHOCYTES # BLD AUTO: 18.9 % — SIGNIFICANT CHANGE UP (ref 13–44)
MAGNESIUM SERPL-MCNC: 1.8 MG/DL — SIGNIFICANT CHANGE UP (ref 1.6–2.6)
MCHC RBC-ENTMCNC: 30.2 PG — SIGNIFICANT CHANGE UP (ref 27–34)
MCHC RBC-ENTMCNC: 30.8 G/DL — LOW (ref 32–36)
MCV RBC AUTO: 98.1 FL — SIGNIFICANT CHANGE UP (ref 80–100)
MONOCYTES # BLD AUTO: 0.76 K/UL — SIGNIFICANT CHANGE UP (ref 0–0.9)
MONOCYTES NFR BLD AUTO: 8.1 % — SIGNIFICANT CHANGE UP (ref 2–14)
NEUTROPHILS # BLD AUTO: 6.37 K/UL — SIGNIFICANT CHANGE UP (ref 1.8–7.4)
NEUTROPHILS NFR BLD AUTO: 68.1 % — SIGNIFICANT CHANGE UP (ref 43–77)
NRBC BLD AUTO-RTO: 0 /100 WBCS — SIGNIFICANT CHANGE UP (ref 0–0)
PHOSPHATE SERPL-MCNC: 3.4 MG/DL — SIGNIFICANT CHANGE UP (ref 2.5–4.5)
PLATELET # BLD AUTO: 259 K/UL — SIGNIFICANT CHANGE UP (ref 150–400)
POTASSIUM SERPL-MCNC: 5.3 MMOL/L — SIGNIFICANT CHANGE UP (ref 3.5–5.3)
POTASSIUM SERPL-SCNC: 5.3 MMOL/L — SIGNIFICANT CHANGE UP (ref 3.5–5.3)
PROT SERPL-MCNC: 7.1 G/DL — SIGNIFICANT CHANGE UP (ref 6–8.3)
RBC # BLD: 3.67 M/UL — LOW (ref 3.8–5.2)
RBC # FLD: 14 % — SIGNIFICANT CHANGE UP (ref 10.3–14.5)
SODIUM SERPL-SCNC: 141 MMOL/L — SIGNIFICANT CHANGE UP (ref 135–145)
WBC # BLD: 9.37 K/UL — SIGNIFICANT CHANGE UP (ref 3.8–10.5)
WBC # FLD AUTO: 9.37 K/UL — SIGNIFICANT CHANGE UP (ref 3.8–10.5)

## 2025-03-05 PROCEDURE — 82962 GLUCOSE BLOOD TEST: CPT

## 2025-03-05 PROCEDURE — 86901 BLOOD TYPING SEROLOGIC RH(D): CPT

## 2025-03-05 PROCEDURE — 85018 HEMOGLOBIN: CPT

## 2025-03-05 PROCEDURE — 83036 HEMOGLOBIN GLYCOSYLATED A1C: CPT

## 2025-03-05 PROCEDURE — 76775 US EXAM ABDO BACK WALL LIM: CPT

## 2025-03-05 PROCEDURE — 80053 COMPREHEN METABOLIC PANEL: CPT

## 2025-03-05 PROCEDURE — 92611 MOTION FLUOROSCOPY/SWALLOW: CPT

## 2025-03-05 PROCEDURE — 82550 ASSAY OF CK (CPK): CPT

## 2025-03-05 PROCEDURE — 84540 ASSAY OF URINE/UREA-N: CPT

## 2025-03-05 PROCEDURE — 85730 THROMBOPLASTIN TIME PARTIAL: CPT

## 2025-03-05 PROCEDURE — 97530 THERAPEUTIC ACTIVITIES: CPT

## 2025-03-05 PROCEDURE — 83935 ASSAY OF URINE OSMOLALITY: CPT

## 2025-03-05 PROCEDURE — 85610 PROTHROMBIN TIME: CPT

## 2025-03-05 PROCEDURE — 82570 ASSAY OF URINE CREATININE: CPT

## 2025-03-05 PROCEDURE — 87640 STAPH A DNA AMP PROBE: CPT

## 2025-03-05 PROCEDURE — 97110 THERAPEUTIC EXERCISES: CPT

## 2025-03-05 PROCEDURE — 93308 TTE F-UP OR LMTD: CPT

## 2025-03-05 PROCEDURE — 87086 URINE CULTURE/COLONY COUNT: CPT

## 2025-03-05 PROCEDURE — 82330 ASSAY OF CALCIUM: CPT

## 2025-03-05 PROCEDURE — 99285 EMERGENCY DEPT VISIT HI MDM: CPT | Mod: 25

## 2025-03-05 PROCEDURE — 81001 URINALYSIS AUTO W/SCOPE: CPT

## 2025-03-05 PROCEDURE — 86870 RBC ANTIBODY IDENTIFICATION: CPT

## 2025-03-05 PROCEDURE — 93321 DOPPLER ECHO F-UP/LMTD STD: CPT

## 2025-03-05 PROCEDURE — 87641 MR-STAPH DNA AMP PROBE: CPT

## 2025-03-05 PROCEDURE — 84484 ASSAY OF TROPONIN QUANT: CPT

## 2025-03-05 PROCEDURE — 74176 CT ABD & PELVIS W/O CONTRAST: CPT | Mod: MC

## 2025-03-05 PROCEDURE — 83605 ASSAY OF LACTIC ACID: CPT

## 2025-03-05 PROCEDURE — 73130 X-RAY EXAM OF HAND: CPT

## 2025-03-05 PROCEDURE — 85025 COMPLETE CBC W/AUTO DIFF WBC: CPT

## 2025-03-05 PROCEDURE — 71045 X-RAY EXAM CHEST 1 VIEW: CPT

## 2025-03-05 PROCEDURE — 80048 BASIC METABOLIC PNL TOTAL CA: CPT

## 2025-03-05 PROCEDURE — 82435 ASSAY OF BLOOD CHLORIDE: CPT

## 2025-03-05 PROCEDURE — 87040 BLOOD CULTURE FOR BACTERIA: CPT

## 2025-03-05 PROCEDURE — 87899 AGENT NOS ASSAY W/OPTIC: CPT

## 2025-03-05 PROCEDURE — 87449 NOS EACH ORGANISM AG IA: CPT

## 2025-03-05 PROCEDURE — 84132 ASSAY OF SERUM POTASSIUM: CPT

## 2025-03-05 PROCEDURE — 96374 THER/PROPH/DIAG INJ IV PUSH: CPT

## 2025-03-05 PROCEDURE — 74230 X-RAY XM SWLNG FUNCJ C+: CPT

## 2025-03-05 PROCEDURE — 84156 ASSAY OF PROTEIN URINE: CPT

## 2025-03-05 PROCEDURE — 85014 HEMATOCRIT: CPT

## 2025-03-05 PROCEDURE — 84100 ASSAY OF PHOSPHORUS: CPT

## 2025-03-05 PROCEDURE — 86860 RBC ANTIBODY ELUTION: CPT

## 2025-03-05 PROCEDURE — 71250 CT THORAX DX C-: CPT | Mod: MC

## 2025-03-05 PROCEDURE — 86905 BLOOD TYPING RBC ANTIGENS: CPT

## 2025-03-05 PROCEDURE — 97162 PT EVAL MOD COMPLEX 30 MIN: CPT

## 2025-03-05 PROCEDURE — 84145 PROCALCITONIN (PCT): CPT

## 2025-03-05 PROCEDURE — 99232 SBSQ HOSP IP/OBS MODERATE 35: CPT

## 2025-03-05 PROCEDURE — 87637 SARSCOV2&INF A&B&RSV AMP PRB: CPT

## 2025-03-05 PROCEDURE — 73110 X-RAY EXAM OF WRIST: CPT

## 2025-03-05 PROCEDURE — 70450 CT HEAD/BRAIN W/O DYE: CPT | Mod: MC

## 2025-03-05 PROCEDURE — 0225U NFCT DS DNA&RNA 21 SARSCOV2: CPT

## 2025-03-05 PROCEDURE — 99239 HOSP IP/OBS DSCHRG MGMT >30: CPT

## 2025-03-05 PROCEDURE — 83735 ASSAY OF MAGNESIUM: CPT

## 2025-03-05 PROCEDURE — 82803 BLOOD GASES ANY COMBINATION: CPT

## 2025-03-05 PROCEDURE — 86900 BLOOD TYPING SEROLOGIC ABO: CPT

## 2025-03-05 PROCEDURE — 36415 COLL VENOUS BLD VENIPUNCTURE: CPT

## 2025-03-05 PROCEDURE — 84295 ASSAY OF SERUM SODIUM: CPT

## 2025-03-05 PROCEDURE — 93970 EXTREMITY STUDY: CPT

## 2025-03-05 PROCEDURE — 82947 ASSAY GLUCOSE BLOOD QUANT: CPT

## 2025-03-05 PROCEDURE — 86850 RBC ANTIBODY SCREEN: CPT

## 2025-03-05 PROCEDURE — 94640 AIRWAY INHALATION TREATMENT: CPT

## 2025-03-05 PROCEDURE — 84300 ASSAY OF URINE SODIUM: CPT

## 2025-03-05 PROCEDURE — 92526 ORAL FUNCTION THERAPY: CPT

## 2025-03-05 PROCEDURE — 85027 COMPLETE CBC AUTOMATED: CPT

## 2025-03-05 PROCEDURE — 83880 ASSAY OF NATRIURETIC PEPTIDE: CPT

## 2025-03-05 PROCEDURE — 92610 EVALUATE SWALLOWING FUNCTION: CPT

## 2025-03-05 PROCEDURE — 86880 COOMBS TEST DIRECT: CPT

## 2025-03-05 PROCEDURE — 84133 ASSAY OF URINE POTASSIUM: CPT

## 2025-03-05 RX ORDER — LOSARTAN POTASSIUM 100 MG/1
1 TABLET, FILM COATED ORAL
Refills: 0 | DISCHARGE

## 2025-03-05 RX ORDER — INSULIN GLARGINE-YFGN 100 [IU]/ML
12 INJECTION, SOLUTION SUBCUTANEOUS
Qty: 0 | Refills: 0 | DISCHARGE
Start: 2025-03-05

## 2025-03-05 RX ORDER — SODIUM BICARBONATE 1 MEQ/ML
2 SYRINGE (ML) INTRAVENOUS
Qty: 0 | Refills: 0 | DISCHARGE
Start: 2025-03-05

## 2025-03-05 RX ORDER — FUROSEMIDE 10 MG/ML
1 INJECTION INTRAMUSCULAR; INTRAVENOUS
Refills: 0 | DISCHARGE

## 2025-03-05 RX ORDER — SODIUM ZIRCONIUM CYCLOSILICATE 5 G/5G
10 POWDER, FOR SUSPENSION ORAL ONCE
Refills: 0 | Status: COMPLETED | OUTPATIENT
Start: 2025-03-05 | End: 2025-03-05

## 2025-03-05 RX ORDER — INSULIN LISPRO 100 U/ML
1 INJECTION, SOLUTION INTRAVENOUS; SUBCUTANEOUS
Qty: 0 | Refills: 0 | DISCHARGE
Start: 2025-03-05

## 2025-03-05 RX ORDER — INSULIN LISPRO 100 U/ML
4 INJECTION, SOLUTION INTRAVENOUS; SUBCUTANEOUS
Qty: 0 | Refills: 0 | DISCHARGE
Start: 2025-03-05

## 2025-03-05 RX ORDER — INSULIN ASPART 100 [IU]/ML
20 INJECTION, SUSPENSION SUBCUTANEOUS
Refills: 0 | DISCHARGE

## 2025-03-05 RX ORDER — AMLODIPINE BESYLATE 10 MG/1
1 TABLET ORAL
Qty: 0 | Refills: 0 | DISCHARGE
Start: 2025-03-05

## 2025-03-05 RX ADMIN — AMLODIPINE BESYLATE 10 MILLIGRAM(S): 10 TABLET ORAL at 06:02

## 2025-03-05 RX ADMIN — Medication 1 CAPSULE(S): at 12:47

## 2025-03-05 RX ADMIN — GABAPENTIN 100 MILLIGRAM(S): 400 CAPSULE ORAL at 06:02

## 2025-03-05 RX ADMIN — METOPROLOL SUCCINATE 100 MILLIGRAM(S): 50 TABLET, EXTENDED RELEASE ORAL at 06:02

## 2025-03-05 RX ADMIN — INSULIN LISPRO 2: 100 INJECTION, SOLUTION INTRAVENOUS; SUBCUTANEOUS at 12:47

## 2025-03-05 RX ADMIN — NYSTATIN 1 APPLICATION(S): 100000 CREAM TOPICAL at 06:02

## 2025-03-05 RX ADMIN — Medication 1300 MILLIGRAM(S): at 06:02

## 2025-03-05 RX ADMIN — Medication 40 MILLIGRAM(S): at 06:02

## 2025-03-05 RX ADMIN — Medication 1 TABLET(S): at 12:46

## 2025-03-05 RX ADMIN — SODIUM ZIRCONIUM CYCLOSILICATE 10 GRAM(S): 5 POWDER, FOR SUSPENSION ORAL at 12:46

## 2025-03-05 RX ADMIN — INSULIN LISPRO 4 UNIT(S): 100 INJECTION, SOLUTION INTRAVENOUS; SUBCUTANEOUS at 09:09

## 2025-03-05 RX ADMIN — APIXABAN 10 MILLIGRAM(S): 2.5 TABLET, FILM COATED ORAL at 06:02

## 2025-03-05 RX ADMIN — CLOPIDOGREL BISULFATE 75 MILLIGRAM(S): 75 TABLET, FILM COATED ORAL at 12:46

## 2025-03-05 RX ADMIN — INSULIN LISPRO 4 UNIT(S): 100 INJECTION, SOLUTION INTRAVENOUS; SUBCUTANEOUS at 12:48

## 2025-03-05 RX ADMIN — Medication 1 CAPSULE(S): at 09:09

## 2025-03-05 NOTE — PROGRESS NOTE ADULT - PROBLEM SELECTOR PROBLEM 2
Altered mental status
Influenza A
Altered mental status
Altered mental status
Influenza A
Altered mental status
Altered mental status
Influenza A
Influenza A

## 2025-03-05 NOTE — PROGRESS NOTE ADULT - PROBLEM SELECTOR PROBLEM 6
S/P CABG x 4
Acute UTI
S/P CABG x 4
Acute UTI

## 2025-03-05 NOTE — PROGRESS NOTE ADULT - PROBLEM SELECTOR PLAN 2
w/ recent diagnosis of Flu A and Parainfluenza  - Reconfirmed on ED RVP     Plan:   - Supportive care  - d/c'd Tamiflu started by admitting team overnight given diagnosis >6 days ago.
Patient AOx4 at baseline; per daughter, patient takes PO independently, calls on the phone, Facetime, play Bingo  - Now, mental status recovered.  - Ddx: Flu vs PNA vs UTI    RESOLVED    Plan:   - Diet: Passed MBS. No aspiration risk.  - Flu, PNA, UTI plans per problems below
w/ recent diagnosis of Flu A and Parainfluenza  - Reconfirmed on ED RVP     Plan:   - Supportive care  - d/c'd Tamiflu started by admitting team overnight given diagnosis >6 days ago.
Patient AOx4 at baseline; per daughter, patient takes PO independently, calls on the phone, Facetime, play Bingo  - Now, mental status recovered.  - Ddx: Flu vs PNA vs UTI    RESOLVED    Plan:   - Diet: Passed MBS. No aspiration risk.  - Flu, PNA, UTI plans per problems below
w/ recent diagnosis of Flu A and Parainfluenza  - Reconfirmed on ED RVP     Plan:   - Supportive care  - d/c'd Tamiflu started by admitting team overnight given diagnosis >6 days ago.
Patient AOx4 at baseline; per daughter, patient takes PO independently, calls on the phone, Facetime, play Bingo  - Now, mental status recovered.  - Ddx: Flu vs PNA vs UTI    RESOLVED    Plan:   - Diet: Passed MBS. No aspiration risk.  - Flu, PNA, UTI plans per problems below
w/ recent diagnosis of Flu A and Parainfluenza  - Reconfirmed on ED RVP     Plan:   - Supportive care  - d/c'd Tamiflu started by admitting team overnight given diagnosis >6 days ago.

## 2025-03-05 NOTE — PROGRESS NOTE ADULT - PROBLEM SELECTOR PLAN 8
Hx of CABG x4 in 2017    Plan:   - home ASA/Plavix
w/ Hx of T2DM    Plan:  - ABY  - Lantus 4U  - A1c: 6.7
w/ Hx of T2DM    Plan:  - ABY  - Lantus 4U  - A1c: 6.7
Hx of CABG x4 in 2017    Plan:   - home ASA/Plavix
Hx of CABG x4 in 2017    Plan:   - now on OAC, d/c ASA, c/w Plavix
Hx of CABG x4 in 2017    Plan:   - now on OAC, d/c ASA, c/w Plavix
w/ Hx of T2DM    Plan:  - ISS only  - f/u A1c
w/ Hx of T2DM    Plan:  - ISS only  - A1c: 6.7
Hx of CABG x4 in 2017    Plan:   - now on OAC, d/c ASA, c/w Plavix

## 2025-03-05 NOTE — PROGRESS NOTE ADULT - SUBJECTIVE AND OBJECTIVE BOX
[facility] DIVISION OF KIDNEY DISEASE AND HYPERTENSION  [phone]    RENAL FOLLOW UP NOTE- NEPHROHOSPITALIST  --------------------------------------------------------------------------------  Patient seen and examined this morning, resting comfortably in bed  Awaiting d/c to [facility]    PAST HISTORY  --------------------------------------------------------------------------------  No significant changes to PMH, PSH, FHx, SHx, unless otherwise noted    ALLERGIES & MEDICATIONS  --------------------------------------------------------------------------------  Allergies    dexamethasone (Unknown)  Wellbutrin SR (Unknown)  contrast dye (Unknown)  tramadol (Unknown)  glipiZIDE (Unknown)  morphine (Unknown)    Intolerances      Standing Inpatient Medications  amLODIPine   Tablet 10 milliGRAM(s) Oral daily  apixaban 10 milliGRAM(s) Oral two times a day  atorvastatin 40 milliGRAM(s) Oral at bedtime  clopidogrel Tablet 75 milliGRAM(s) Oral daily  dextrose 5%. 1000 milliLiter(s) IV Continuous <Continuous>  dextrose 5%. 1000 milliLiter(s) IV Continuous <Continuous>  dextrose 50% Injectable 25 Gram(s) IV Push once  dextrose 50% Injectable 12.5 Gram(s) IV Push once  dextrose 50% Injectable 25 Gram(s) IV Push once  gabapentin 100 milliGRAM(s) Oral three times a day  glucagon  Injectable 1 milliGRAM(s) IntraMuscular once  insulin glargine Injectable (LANTUS) 12 Unit(s) SubCutaneous at bedtime  insulin lispro (ADMELOG) corrective regimen sliding scale   SubCutaneous three times a day before meals  insulin lispro (ADMELOG) corrective regimen sliding scale   SubCutaneous at bedtime  insulin lispro Injectable (ADMELOG) 4 Unit(s) SubCutaneous three times a day before meals  metoprolol succinate  milliGRAM(s) Oral daily  Nephro-deep 1 Tablet(s) Oral daily  nystatin Powder 1 Application(s) Topical two times a day  pancrelipase  (CREON  6,000 Lipase Units) 1 Capsule(s) Oral three times a day with meals  pantoprazole    Tablet 40 milliGRAM(s) Oral two times a day  sodium bicarbonate 1300 milliGRAM(s) Oral two times a day  sodium zirconium cyclosilicate 10 Gram(s) Oral once    PRN Inpatient Medications  calamine/zinc oxide Lotion 1 Application(s) Topical two times a day PRN  dextrose Oral Gel 15 Gram(s) Oral once PRN      FOCUSED REVIEW OF SYSTEMS  --------------------------------------------------------------------------------  denies fevers/rigors  denies CP/palpitations  denies SOB/cough  denies dysuria/hematuria      VITALS/PHYSICAL EXAM  --------------------------------------------------------------------------------  T(C): 36.6 (03-05-25 @ 04:53), Max: 36.7 (03-04-25 @ 16:16)  HR: 77 (03-05-25 @ 04:53) (67 - 77)  BP: 148/79 (03-05-25 @ 04:53) (121/70 - 157/76)  RR: 18 (03-05-25 @ 04:53) (18 - 18)  SpO2: 92% (03-05-25 @ 04:53) (91% - 95%)  Wt(kg): --        03-04-25 @ 07:01  -  03-05-25 @ 07:00  --------------------------------------------------------  IN: 150 mL / OUT: 700 mL / NET: -550 mL      Physical Exam:  	Gen: NAD, lying in bed  	Pulm: CTA B/L ant/lat fields  	CV: RRR, S1S2  	Abd: +BS, soft, nontender/nondistended  	: No suprapubic tenderness.  no saldivar          Extremity: No LE edema      LABS/STUDIES  --------------------------------------------------------------------------------              11.1   9.37  >-----------<  259      [03-05-25 @ 07:06]              36.0     141  |  107  |  35  ----------------------------<  100      [03-05-25 @ 07:06]  5.3   |  19  |  2.09        Ca     8.7     [03-05-25 @ 07:06]      Mg     1.8     [03-05-25 @ 07:06]      Phos  3.4     [03-05-25 @ 07:06]    TPro  7.1  /  Alb  3.1  /  TBili  0.6  /  DBili  x   /  AST  27  /  ALT  14  /  AlkPhos  102  [03-05-25 @ 07:06]            Creatinine Trend:  SCr 2.09 [03-05 @ 07:06]  SCr 2.08 [03-04 @ 07:09]  SCr 2.09 [03-03 @ 20:40]  SCr 2.21 [03-03 @ 15:15]  SCr 2.58 [03-02 @ 07:13]              Urinalysis - [03-05-25 @ 07:06]      Color  / Appearance  / SG  / pH       Gluc 100 / Ketone   / Bili  / Urobili        Blood  / Protein  / Leuk Est  / Nitrite       RBC  / WBC  / Hyaline  / Gran  / Sq Epi  / Non Sq Epi  / Bacteria     Urine Creatinine 294      [02-27-25 @ 08:43]  Urine Protein 585      [02-27-25 @ 08:43]  Urine Sodium 53      [02-27-25 @ 08:43]  Urine Urea Nitrogen 806      [02-27-25 @ 08:43]  Urine Potassium 70      [02-27-25 @ 08:43]  Urine Osmolality 640      [02-27-25 @ 08:43]

## 2025-03-05 NOTE — PROGRESS NOTE ADULT - PROBLEM SELECTOR PLAN 1
Patient with CKD (baseline sCr ~1.8); sCr uptrending during admission   - Ddx likely prerenal given NPO for >24 hours while AMS on presentation     Plan:  - s/p IVFs, while NPO and while taking some PO.  - Urine studies w/ FeNa: 0.4%, suggesting pre-renal  - f/u bladder scan  - Nephro c/s'd: appreciate recs
in setting of flu, likely atn, now resolved and creatinine at baseline  on sodium bicarb tabs 1300mg po tid for metabolic acidosis  patient with intermittent hyperkalemia, lokelma dosed by primary team for K of 5.3 today  -continue K restricted diet    ?Type 4RTA iso DM  proteinuria likely 2/2 Diabetic nephropathy
Patient with CKD (baseline sCr ~1.8); sCr uptrending during admission   - Ddx likely prerenal given NPO for >24 hours while AMS on presentation     Plan:  - IVFs  - Urine studies w/ FeNa: 0.4%, suggesting pre-renal  - bladder scan (-) for retention.   - Sodium Bicarb tabs 1300 BID  - Nephro c/s'd: appreciate recs
Patient AOx4 at baseline; per daughter, patient takes PO independently, calls on the phone, Facetime, play Bingo  - Now, mental status recovered.  - Ddx: Flu vs PNA vs UTI    RESOLVED    Plan:   - Diet: Minced/Moist & Moderately Thick Liquids pending MBS.  - Flu, PNA, UTI plans per problems below
Patient AOx4 at baseline; per daughter, patient takes PO independently, calls on the phone, Facetime, play Bingo  - Now, mental status recovered.  - Ddx: Flu vs PNA vs UTI    RESOLVED    Plan:   - Diet: Passed MBS. No aspiration risk.  - Flu, PNA, UTI plans per problems below
Patient with CKD (baseline sCr ~1.8); sCr uptrending during admission   - Ddx likely prerenal given NPO for >24 hours while AMS on presentation     RESOLVED    Plan:  - d/c IVFs  - Urine studies w/ FeNa: 0.4%, suggesting pre-renal  - bladder scan (-) for retention.   - Sodium Bicarb tabs 1300 BID  - Nephro c/s'd: appreciate recs
Patient AOx4 at baseline; per daughter, patient takes PO independently, calls on the phone, Facetime, play Bingo  - Now, mental status recovered.  - Ddx: Flu vs PNA vs UTI    RESOLVED    Plan:   - Diet: Passed MBS. No aspiration risk.  - Flu, PNA, UTI plans per problems below
Patient with CKD (baseline sCr ~1.8); sCr uptrending during admission   - Ddx likely prerenal given NPO for >24 hours while AMS on presentation     RESOLVED    Plan:  - d/c IVFs  - Urine studies w/ FeNa: 0.4%, suggesting pre-renal  - bladder scan (-) for retention.   - Sodium Bicarb tabs 1300 BID  - Nephro c/s'd: appreciate recs
Patient AOx4 at baseline; per daughter, patient takes PO independently, calls on the phone, Facetime, play Bingo  - At current, AOx0.   - Ddx: Flu vs PNA vs UTI    Plan:   - f/u S/S: At current, NPO  - On D5NS while NPO  - Convert appropriate home meds to IV if possible  - Per daughter, would want NGT if PO status continues to not improve.  - Flu, PNA, UTI plans per problems below
Patient with CKD (baseline sCr ~1.8); sCr uptrending during admission   - Ddx likely prerenal given NPO for >24 hours while AMS on presentation     Plan:  - s/p IVFs, while NPO and while taking some PO.  - Urine studies w/ FeNa: 0.4%, suggesting pre-renal  - f/u bladder scan  - Nephro c/s'd: appreciate recs

## 2025-03-05 NOTE — PROGRESS NOTE ADULT - PROBLEM SELECTOR PROBLEM 7
Coffee ground emesis
HTN (hypertension)
HTN (hypertension)
Coffee ground emesis
HTN (hypertension)
Coffee ground emesis
Coffee ground emesis
HTN (hypertension)
Coffee ground emesis

## 2025-03-05 NOTE — ADVANCED PRACTICE NURSE CONSULT - REASON FOR CONSULT
Requested by nursing staff to assess skin status of patient admitted with possible skin breakdown to sacrum/bilateral buttocks and ostomy management (see colleague's note on ostomy care/management & peristomal skin care)  PMH is noted per review of chart:  74F c hx dementia, HTN, DM2, UC s/p colectomy and ileostomy, Afib, CAD, ?CHF, GERD, pancreatic insufficiency, migraines, anxiety, venous insufficiency, CKD (baseline Cr ~1.8), recent influenza diagnosed 6 days ago, presents from [facility] with cough, confusion, poss coffee ground emesis.    Unable to obtain history from pt who is awake, looking around, briefly regards, but nonverbal, not acknowledging voice, not following commands.  Review of med list shows pt was started on cefepime on 2/18/25.  unknown baseline mental/functional status.  Reportedly pt had AMS and O2 sat of 93% RA at nursing facility.  Per daughter pt diagnosed with flu approximately 6 days ago but unknown if pt was given tamiflu.  Also poss reported coffee ground emesis?  While in the ED, pt received haldol 1mg      
Drain management, pouching issues.  Complete pouching system change.    74F c hx dementia, HTN, DM2, UC s/p colectomy and ileostomy, Afib, CAD, ?CHF, GERD, pancreatic insufficiency, migraines, anxiety, venous insufficiency, CKD (baseline Cr ~1.8), recent influenza diagnosed 6 days ago, presents from [facility] with cough, confusion, poss coffee ground emesis.    Unable to obtain history from pt who is awake, looking around, briefly regards, but nonverbal, not acknowledging voice, not following commands.  Called [facility] but no one picking up. Called pt's daughter/HCP Miguelina Wheeler but no one answering, so voicemail was left. Review of med list shows pt was started on cefepime on 2/18/25.  unknown baseline mental/functional status.  Reportedly pt had AMS and O2 sat of 93% RA at nursing facility.  Per daughter pt diagnosed with flu approximately 6 days ago but unknown if pt was given tamiflu.  Also poss reported coffee ground emesis?    While in the ED, pt received haldol 1mg    
Ostomy care f/u.  Complete pouching system change.  
Ostomy f/u; d/c planning  Complete pouching system change
Routine follow up for pouching issues.  Complete pouch change

## 2025-03-05 NOTE — PROGRESS NOTE ADULT - PROBLEM SELECTOR PROBLEM 3
Influenza A
Influenza A
CAP (community acquired pneumonia)
Influenza A
CAP (community acquired pneumonia)
CAP (community acquired pneumonia)
Influenza A
CAP (community acquired pneumonia)
Influenza A

## 2025-03-05 NOTE — PROGRESS NOTE ADULT - PROBLEM SELECTOR PLAN 4
Patient w/ AMS, possible 2.2 to acute UTI.   - UA w/ LEs, Bacteria, WBC    Plan:   - Zosyn as above  - UCx 24 hours: NGTD
Patient w/ AMS, possible 2.2 to acute UTI.   - UA w/ LEs, Bacteria, WBC    Plan:   - Zosyn as above  - UCx 24 hours: NGTD
CT Chest w/ LLL opacity concerning for CAP vs aspiration PNA.    Plan:   - urine legionella, strep pneumo (-)  - s/p 5x days Zosyn for aspiration coverage  - defer Azithro for atypical given lobar pattern  - Procal: 0.12  - trend WBC, fever curve
Patient w/ AMS, possible 2.2 to acute UTI.   - UA w/ LEs, Bacteria, WBC    Plan:   - Zosyn as above  - UCx 24 hours: NGTD
CT Chest w/ LLL opacity concerning for CAP vs aspiration PNA.    Plan:   - urine legionella, strep pneumo (-)  - s/p 5x days Zosyn for aspiration coverage  - defer Azithro for atypical given lobar pattern  - Procal: 0.12  - trend WBC, fever curve
CT Chest w/ LLL opacity concerning for CAP vs aspiration PNA.    Plan:   - urine legionella, strep pneumo (-)  - s/p 5x days Zosyn for aspiration coverage  - defer Azithro for atypical given lobar pattern  - Procal: 0.12  - trend WBC, fever curve
Patient w/ AMS, possible 2.2 to acute UTI.   - UA w/ LEs, Bacteria, WBC    Plan:   - Zosyn as above  - UCx 24 hours: NGTD
CT Chest w/ LLL opacity concerning for CAP vs aspiration PNA.    Plan:   - urine legionella, strep pneumo (-)  - s/p 5x days Zosyn for aspiration coverage  - defer Azithro for atypical given lobar pattern  - Procal: 0.12  - trend WBC, fever curve
CT Chest w/ LLL opacity concerning for CAP vs aspiration PNA.    Plan:   - urine legionella, strep pneumo (-)  - s/p 5x days Zosyn for aspiration coverage  - defer Azithro for atypical given lobar pattern  - Procal: 0.12  - trend WBC, fever curve
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
## 2025-03-05 NOTE — PROGRESS NOTE ADULT - PROBLEM SELECTOR PLAN 5
Obtained collateral for daughter -- definite episode of emesis at NH; possible c/f coffee ground.    Plan:  - Protonix 40 PO BID   - f/u type and screen  - trend H/H  - CTM
Obtained collateral for daughter -- definite episode of emesis at NH; possible c/f coffee ground.    Plan:  - Protonix 40 PO BID   - f/u type and screen  - trend H/H  - CTM
L Below Knee DVT in Gastrocnemius Vein found on 2/28    Plan:   - No need to start AC for below knee DVT  - repeat LE Duplex in 1 week for serial monitoring.
Obtained collateral for daughter -- definite episode of emesis at NH; possible c/f coffee ground.    Plan:  - Protonix 40 PO BID   - f/u type and screen  - trend H/H  - CTM
L Below Knee DVT in Gastrocnemius Vein found on 2/28    Plan:   - Eliquis 10 BID for 7x days for loading -> 5 mg BID for 3-6 months after  - repeat LE Duplex in 1 week for serial monitoring.
Obtained collateral for daughter -- definite episode of emesis at NH; possible c/f coffee ground.    Plan:  - c/w Protonix 40 IV BID  - Holding home ASA/Plavix for +/- UGIB/current NPO status  - f/u type and screen  - trend H/H  - CTM
L Below Knee DVT in Gastrocnemius Vein found on 2/28    Plan:   - Eliquis 10 BID for 7x days for loading -> 5 mg BID for 3-6 months after  - repeat LE Duplex in 1 week for serial monitoring.
L Below Knee DVT in Gastrocnemius Vein found on 2/28    Plan:   - No need to start AC for below knee DVT  - repeat LE Duplex in 1 week for serial monitoring.
L Below Knee DVT in Gastrocnemius Vein found on 2/28    Plan:   - Eliquis 10 BID for 7x days for loading -> 5 mg BID for 3-6 months after  - repeat LE Duplex in 1 week for serial monitoring.

## 2025-03-05 NOTE — PROGRESS NOTE ADULT - PROBLEM SELECTOR PLAN 3
w/ recent diagnosis of Flu A and Parainfluenza  - Reconfirmed on ED RVP     Plan:   - Supportive care  - d/c'd Tamiflu started by admitting team overnight given diagnosis >6 days ago.
CT Chest w/ LLL opacity concerning for CAP vs aspiration PNA.    Plan:   - f/u urine legionella, strep pneumo  - IV Zosyn for 5x days for aspiration coverage  - defer Azithro for atypical given lobar pattern  - f/u Procal  - trend WBC, fever curve
w/ recent diagnosis of Flu A and Parainfluenza  - Reconfirmed on ED RVP     Plan:   - Supportive care  - d/c'd Tamiflu started by admitting team overnight given diagnosis >6 days ago.
CT Chest w/ LLL opacity concerning for CAP vs aspiration PNA.    Plan:   - urine legionella, strep pneumo (-)  - IV Zosyn for 5x days for aspiration coverage (Day 5 today)  - defer Azithro for atypical given lobar pattern  - Procal: 0.12  - trend WBC, fever curve
CT Chest w/ LLL opacity concerning for CAP vs aspiration PNA.    Plan:   - urine legionella: (-)  - strep pneumo  - IV Zosyn for 5x days for aspiration coverage  - defer Azithro for atypical given lobar pattern  - Procal: 0.12  - trend WBC, fever curve
CT Chest w/ LLL opacity concerning for CAP vs aspiration PNA.    Plan:   - f/u urine legionella, strep pneumo  - IV Zosyn for 5x days for aspiration coverage  - defer Azithro for atypical given lobar pattern  - Procal: 0.12  - trend WBC, fever curve
w/ recent diagnosis of Flu A and Parainfluenza  - Reconfirmed on ED RVP     Plan:   - Supportive care  - d/c'd Tamiflu started by admitting team overnight given diagnosis >6 days ago.

## 2025-03-05 NOTE — PROGRESS NOTE ADULT - PROBLEM SELECTOR PROBLEM 1
Acute kidney injury superimposed on CKD
Altered mental status
Acute kidney injury superimposed on CKD
Altered mental status
Acute kidney injury superimposed on CKD
Acute kidney injury superimposed on CKD

## 2025-03-05 NOTE — DISCHARGE NOTE NURSING/CASE MANAGEMENT/SOCIAL WORK - PATIENT PORTAL LINK FT
You can access the FollowMyHealth Patient Portal offered by [facility] by registering at the following website: [email or web address]. By joining [facility]’s FollowMyHealth portal, you will also be able to view your health information using other applications (apps) compatible with our system.

## 2025-03-05 NOTE — DISCHARGE NOTE NURSING/CASE MANAGEMENT/SOCIAL WORK - NSDPFAC_GEN_ALL_CORE
[facility] [place]- [address], [place] [address]

## 2025-03-05 NOTE — DISCHARGE NOTE NURSING/CASE MANAGEMENT/SOCIAL WORK - FINANCIAL ASSISTANCE
[facility] provides services at a reduced cost to those who are determined to be eligible through [facility]’s financial assistance program. Information regarding [facility]’s financial assistance program can be found by going to [email or web address] or by calling [phone].

## 2025-03-05 NOTE — PROGRESS NOTE ADULT - PROBLEM SELECTOR PLAN 7
Obtained collateral for daughter -- definite episode of emesis at NH; possible c/f coffee ground.    Plan:  - Protonix 40 PO BID   - f/u type and screen  - trend H/H  - CTM
Obtained collateral for daughter -- definite episode of emesis at NH; possible c/f coffee ground.    Plan:  - Protonix 40 PO BID   - f/u type and screen  - trend H/H  - CTM
w/ hx of HTN  - on home oral meds    Plan:   - restart home antiHTN meds  - hold home Lasix
w/ hx of HTN  - on home oral meds    Plan:   - While NPO -> Lopressor 10 mg IVP q6; Hydral 5 TID for HTN   - Hold home Lasix
w/ hx of HTN  - on home oral meds    Plan:   - restart home antiHTN meds  - hold home Lasix
Obtained collateral for daughter -- definite episode of emesis at NH; possible c/f coffee ground.    Plan:  - Protonix 40 PO BID   - f/u type and screen  - trend H/H  - CTM
w/ hx of HTN  - on home oral meds    Plan:   - restart home antiHTN meds  - hold home Lasix
Obtained collateral for daughter -- definite episode of emesis at NH; possible c/f coffee ground.    Plan:  - Protonix 40 PO BID   - f/u type and screen  - trend H/H  - CTM
Obtained collateral for daughter -- definite episode of emesis at NH; possible c/f coffee ground.    Plan:  - Protonix 40 PO BID   - f/u type and screen  - trend H/H  - CTM

## 2025-03-05 NOTE — PROGRESS NOTE ADULT - PROBLEM SELECTOR PROBLEM 8
DM2 (diabetes mellitus, type 2)
S/P CABG x 4
DM2 (diabetes mellitus, type 2)
S/P CABG x 4

## 2025-03-05 NOTE — PROGRESS NOTE ADULT - PROBLEM SELECTOR PLAN 10
w/ Hx of T2DM    Plan:  - ABY  - Lantus 4U  - A1c: 6.7
w/ Hx of T2DM    Plan:  - ISS  - Lantus 12U  - Lispro 4U pre-meal  - A1c: 6.7
w/ Hx of T2DM    Plan:  - ISS  - Lantus 12U  - Lispro 4U pre-meal  - A1c: 6.7
w/ Hx of T2DM    Plan:  - ABY  - Lantus 4U  - A1c: 6.7
w/ Hx of T2DM    Plan:  - ISS  - Lantus 12U  - Lispro 4U pre-meal  - A1c: 6.7

## 2025-03-05 NOTE — PROGRESS NOTE ADULT - PROBLEM/PLAN-2
Excuse Slip    Ev Hernandez,           This is to certify that the above-named patient had an appointment at this office for professional attention on May 9, 2022.    Please excuse her:  (X)    FROM:   (X)    DUE TO:        Work      Injury   xxx    School  xxx    Illness       Gym      Other       Other        Comments:    May return to classes 11 May 2022, or sooner for resolved or improved symptoms    Thank you,          Samuel Kimble PA-C  [address] [facility], [place] [address]  
DISPLAY PLAN FREE TEXT

## 2025-03-05 NOTE — PROGRESS NOTE ADULT - PROVIDER SPECIALTY LIST ADULT
Internal Medicine
Nephrology
Internal Medicine
Nephrology
Nephrology

## 2025-03-05 NOTE — DISCHARGE NOTE NURSING/CASE MANAGEMENT/SOCIAL WORK - NSDCPEFALRISK_GEN_ALL_CORE
For information on Fall & Injury Prevention, visit: [email or web address] OR  [email or web address] OR  https://www.cdc.gov/steadi/patient.html

## 2025-03-05 NOTE — PROGRESS NOTE ADULT - NSPROGADDITIONALINFOA_GEN_ALL_CORE
Patient advised she will need nephrology f/u as outpatient.  States she lives in [place] and was hospitalized here while visiting her daughter.  Advised to discuss finding nephrology f/u local to her upon d/c from Rehab.    Patient may also choose to f/u  with [facility] of Kidney Disease and Hypertension s/p rehab stay with one of my colleagues (I do not see patients in the office).  Please provide below information to patient upon discharge:    [facility] OF KIDNEY DISEASE AND HYPERTENSION    [address], [address] [place] [address]    [phone]    Please do not hesitate to TEAMS Dr. Marr if further input needed during the day.  For questions Weekdays after 5PM and on weekends, please page the Renal Fellow on call.

## 2025-03-05 NOTE — ADVANCED PRACTICE NURSE CONSULT - RECOMMEDATIONS
Will recommend:    1. Empty pouch when 1/3-1/2 full   2. Change pouching system every 3-4 days & prn leakage  3. Contact ostomy specialists if questions, concerns/issues .  4. Supplies: [place] 2 1/4" Ceraplus soft convex skin barrier (#37407), [place] 2 1/4" drainable pouch (#91562); Accessory products:  stoma paste #[id number], stoma powder (#8320) & Cavilon No sting barrier film wipe (#3840)  Will f/u. Support & encouragement provided throughout visit.  
Will recommend:  1. Empty pouch when 1/3-1/2 full   2. Change pouching system every 3-4 days & prn leakage  3. Contact ostomy specialists if questions, concerns/issues .  4. Supplies: [place] 2 1/4" Ceraplus soft convex skin barrier (#51307), [place]na 2 1/4" drainable pouch (#99198); Accessory products:  stoma paste #[id number], stoma powder (#2959) & Cavilon No sting barrier film wipe (#9921)  Will f/u. Support & encouragement provided throughout visit.        
Will recommend:  1. Empty pouch when 1/3-1/2 full   2. Change pouching system every 3-4 days & prn leakage  3. Contact ostomy specialists if questions, concerns/issues .  4. Supplies: [place] 2 1/4" Ceraplus soft convex skin barrier (#91921), [place]na 2 1/4" drainable pouch (#26134); Accessory products:  stoma paste #[id number], stoma powder (#6630) & Cavilon No sting barrier film wipe (#8320), stoma belt #6184  Will f/u. Support & encouragement provided throughout visit.                  
Will recommend:  1. Empty pouch when 1/3-1/2 full   2. Change pouching system every 3-4 days & prn leakage  3. Contact ostomy specialists if questions, concerns/issues .  4. Supplies: [place] 2 1/4" Ceraplus soft convex skin barrier (#77705), [place]na 2 1/4" drainable pouch (#73879); Accessory products:  stoma paste #[id number], stoma powder (#3768) & Cavilon No sting barrier film wipe (#1667)  Will f/u. Support & encouragement provided throughout visit.      
Will recommend:  1. Topical therapy- sacrococcygeal/bilateral buttocks- cleanse w/incontinent cleanser, pat dry & apply Alex moisture barrier ointment twice daily & prn soiling episodes  2. Incontinent management - incontinent cleanser, pads, pericare BID, external female catheter  3. Maintain on an alternating air with low air loss surface  4. Turn & reposition every 2 hr; Use positioning pillow to turn and reposition, soft pillow between bony prominences; continue measures to decrease friction/shear/pressure  5. Elevate heels; apply Complete Cair air fluidized boots to feet or Haile-lock positioner; ensure that the soles of the feet are not resting on the foot board of the bed  6. Nutrition optimization.  7. Apply "Sween 24 once a day moisturizer" daily to lower extremities.  Discussed treatment plan w/staff RNAlanis at bedside.

## 2025-03-05 NOTE — ADVANCED PRACTICE NURSE CONSULT - ASSESSMENT
Chart reviewed & events noted. In at bedside for routine ostomy care follow up. Pt in bed, sleeping but awakens easily. Pt more awake & alert, & conversant. Pt still lethargic but agreed to complete pouching system change as d/c planning to [facility]ETRI per  notes. On assessment, pouching system is leaking. Complete pouching system changed. Loop ileostomy- 1 1/8"L x 1 3/8" pink& viable, flush with skin, + creases at 3&9 o'clock, draining mcgowan brown pasty stool. Peristomal skin slightly denuded, surrounding skin in LLQ abd w/ scattered rashes which appears fungal. Nystatin powder at bedside, applied & dabbed w/ cavilon 3M no sting barrier wipe to seal & protect. Mucocutaneous junction intact. Stoma powder applied to denuded peristomal skin to absorb moisture & dabbed w/ cavilon 3M to seal & protect skin.  Repouched w/[place] 2 1/4" soft convex skin barrier, bead of paste applied to skin creases to level surface & at the back of skin barrier to caulk & drainable pouch. Stoma belt applied to add extra support at 3 & 9 o'clock position. Supplies with pattern at bedside.  Discussed with staff that if patient is to be discharged today to please take hospital belonging bag with ostomy supplies. Pt & staff express understanding using verbal teach back.

## 2025-03-05 NOTE — PROGRESS NOTE ADULT - REASON FOR ADMISSION
confusion, poss coffee ground emesis

## 2025-03-05 NOTE — PROGRESS NOTE ADULT - SUBJECTIVE AND OBJECTIVE BOX
Dhruv Radford M.D.   PGY-1 Internal Medicine  ** Note not finalized until signed by attending **   --------------------------  Dhruv Radford MD  Internal Medicine   PGY-1  --------------------------    SUBJECTIVE / OVERNIGHT EVENTS:    Pt seen in AM at bedside, resting comfortably in bed, and does not appear to be in any acute distress. When asked, pt denies any recent or active fever, chills, nausea, vomiting, headache, acute sob, chest pain, abdominal pain, genitourinary sx, extremity pain or swelling.    MEDICATIONS  (STANDING):  amLODIPine   Tablet 10 milliGRAM(s) Oral daily  apixaban 10 milliGRAM(s) Oral two times a day  atorvastatin 40 milliGRAM(s) Oral at bedtime  clopidogrel Tablet 75 milliGRAM(s) Oral daily  dextrose 5%. 1000 milliLiter(s) (100 mL/Hr) IV Continuous <Continuous>  dextrose 5%. 1000 milliLiter(s) (50 mL/Hr) IV Continuous <Continuous>  dextrose 50% Injectable 25 Gram(s) IV Push once  dextrose 50% Injectable 12.5 Gram(s) IV Push once  dextrose 50% Injectable 25 Gram(s) IV Push once  gabapentin 100 milliGRAM(s) Oral three times a day  glucagon  Injectable 1 milliGRAM(s) IntraMuscular once  insulin glargine Injectable (LANTUS) 12 Unit(s) SubCutaneous at bedtime  insulin lispro (ADMELOG) corrective regimen sliding scale   SubCutaneous three times a day before meals  insulin lispro (ADMELOG) corrective regimen sliding scale   SubCutaneous at bedtime  insulin lispro Injectable (ADMELOG) 4 Unit(s) SubCutaneous three times a day before meals  metoprolol succinate  milliGRAM(s) Oral daily  Nephro-deep 1 Tablet(s) Oral daily  nystatin Powder 1 Application(s) Topical two times a day  pancrelipase  (CREON  6,000 Lipase Units) 1 Capsule(s) Oral three times a day with meals  pantoprazole    Tablet 40 milliGRAM(s) Oral two times a day  sodium bicarbonate 1300 milliGRAM(s) Oral two times a day    MEDICATIONS  (PRN):  calamine/zinc oxide Lotion 1 Application(s) Topical two times a day PRN Rash and/or Itching  dextrose Oral Gel 15 Gram(s) Oral once PRN Blood Glucose LESS THAN 70 milliGRAM(s)/deciliter    CAPILLARY BLOOD GLUCOSE    POCT Blood Glucose.: 183 mg/dL (05 Mar 2025 12:35)  POCT Blood Glucose.: 130 mg/dL (05 Mar 2025 08:58)  POCT Blood Glucose.: 109 mg/dL (04 Mar 2025 21:28)  POCT Blood Glucose.: 203 mg/dL (04 Mar 2025 16:46)    I&O's Summary    04 Mar 2025 07:01  -  05 Mar 2025 07:00  --------------------------------------------------------  IN: 150 mL / OUT: 700 mL / NET: -550 mL    PHYSICAL EXAM:  Vital Signs Last 24 Hrs  T(C): 36.6 (05 Mar 2025 04:53), Max: 36.7 (04 Mar 2025 16:16)  T(F): 97.8 (05 Mar 2025 04:53), Max: 98.1 (04 Mar 2025 16:16)  HR: 77 (05 Mar 2025 04:53) (67 - 77)  BP: 148/79 (05 Mar 2025 04:53) (121/70 - 157/76)  RR: 18 (05 Mar 2025 04:53) (18 - 18)  SpO2: 92% (05 Mar 2025 04:53) (91% - 95%)    Parameters below as of 05 Mar 2025 04:53  Patient On (Oxygen Delivery Method): room air    CONSTITUTIONAL: NAD; well-developed  HEENT: PERRL, clear conjunctiva  RESPIRATORY: Normal respiratory effort; lungs are clear to auscultation bilaterally; No Crackles/Rhonchi/Wheezing  CARDIOVASCULAR: Regular rate and rhythm, normal S1 and S2, no murmur/rub/gallop; No lower extremity edema; Peripheral pulses are 2+ bilaterally  ABDOMEN: +Ostomy in place; Nontender to palpation, normoactive bowel sounds, no rebound/guarding; No hepatosplenomegaly  MUSCULOSKELETAL: no clubbing or cyanosis of digits; no joint swelling or tenderness to palpation  EXT: +Tender to palpation LLE below knee.  NEURO: A&Ox3; verbal. Conversive.  PSYCH: normal mood; Affect appropriate    LABS:                        11.1   9.37  )-----------( 259      ( 05 Mar 2025 07:06 )             36.0     03-05    141  |  107  |  35[H]  ----------------------------<  100[H]  5.3   |  19[L]  |  2.09[H]    Ca    8.7      05 Mar 2025 07:06  Phos  3.4     03-05  Mg     1.8     03-05    TPro  7.1  /  Alb  3.1[L]  /  TBili  0.6  /  DBili  x   /  AST  27  /  ALT  14  /  AlkPhos  102  03-05    Urinalysis Basic - ( 05 Mar 2025 07:06 )  Color: x / Appearance: x / SG: x / pH: x  Gluc: 100 mg/dL / Ketone: x  / Bili: x / Urobili: x   Blood: x / Protein: x / Nitrite: x   Leuk Esterase: x / RBC: x / WBC x   Sq Epi: x / Non Sq Epi: x / Bacteria: x    RADIOLOGY & ADDITIONAL TESTS:  Results Reviewed: X  Imaging Personally Reviewed: X  Electrocardiogram Personally Reviewed: X

## 2025-03-05 NOTE — PROGRESS NOTE ADULT - ASSESSMENT
74F with medical hx significant for dementia, HTN, DM2, UC s/p colectomy and ileostomy, Afib, CAD, ?CHF, GERD, pancreatic insufficiency, and CKD (baseline Cr ~1.7-2) with recent influenza diagnosed 6 days ago, presents from [facility] with cough, confusion, and poor PO intake.     Nephrology consulted for KARO on CKD.    
74F with medical hx significant for dementia, HTN, DM2, UC s/p colectomy and ileostomy, Afib, CAD, ?CHF, GERD, pancreatic insufficiency, and CKD (baseline Cr ~1.7-2) with recent influenza, presents from [facility] with cough, confusion, and poor PO intake.     Nephrology consulted for KARO on CKD.    
74F with medical hx significant for dementia, HTN, DM2, UC s/p colectomy and ileostomy, Afib, CAD, ?CHF, GERD, pancreatic insufficiency, and CKD (baseline Cr ~1.7-2) with recent influenza diagnosed 6 days ago, presents from [facility] with cough, confusion, and poor PO intake.     Nephrology consulted for KARO on CKD.    
74F c hx HTN, DM2, UC s/p colectomy and ileostomy, s/p CABG. CHF? GERD, pancreatic insufficiency, migraines, anxiety, venous insufficiency, CKD (baseline Cr ~1.8), recent influenza diagnosed 6 days ago, p/w acute metabolic encephalopathy in setting of influenza, CAP, poss UTI, and poss coffee ground emesis. On IV Zosyn for UTI and CAP/Aspiration PNA. At baseline AOx4 and conversational; now at baseline w/ regards to mental status. Waiting placement at different facility.
74F c hx HTN, DM2, UC s/p colectomy and ileostomy, s/p CABG. CHF? GERD, pancreatic insufficiency, migraines, anxiety, venous insufficiency, CKD (baseline Cr ~1.8), recent influenza diagnosed 6 days ago, p/w acute metabolic encephalopathy in setting of influenza, CAP, poss UTI, and poss coffee ground emesis. On IV Zosyn for UTI and CAP/Aspiration PNA. At baseline AOx4 and conversational; now at baseline w/ regards to mental status. Waiting placement at different facility.
74F c hx dementia, HTN, DM2, UC s/p colectomy and ileostomy, s/p CABG. CHF? GERD, pancreatic insufficiency, migraines, anxiety, venous insufficiency, CKD (baseline Cr ~1.8), recent influenza diagnosed 6 days ago, p/w acute metabolic encephalopathy in setting of influenza, CAP, poss UTI, and poss coffee ground emesis. On IV Zosyn for UTI and CAP/Aspiration PNA. At baseline AOx4 and conversational; at current, AOx0 and non-verbal.
74F c hx HTN, DM2, UC s/p colectomy and ileostomy, s/p CABG. CHF? GERD, pancreatic insufficiency, migraines, anxiety, venous insufficiency, CKD (baseline Cr ~1.8), recent influenza diagnosed 6 days ago, p/w acute metabolic encephalopathy in setting of influenza, CAP, poss UTI, and poss coffee ground emesis. On IV Zosyn for UTI and CAP/Aspiration PNA. At baseline AOx4 and conversational; now at baseline w/ regards to mental status. Waiting placement at different facility.
74F c hx HTN, DM2, UC s/p colectomy and ileostomy, s/p CABG. CHF? GERD, pancreatic insufficiency, migraines, anxiety, venous insufficiency, CKD (baseline Cr ~1.8), recent influenza diagnosed 6 days ago, p/w acute metabolic encephalopathy in setting of influenza, CAP, poss UTI, and poss coffee ground emesis. On IV Zosyn for UTI and CAP/Aspiration PNA. At baseline AOx4 and conversational; now at baseline w/ regards to mental status. Waiting placement at different facility.
74F c hx HTN, DM2, UC s/p colectomy and ileostomy, s/p CABG. CHF? GERD, pancreatic insufficiency, migraines, anxiety, venous insufficiency, CKD (baseline Cr ~1.8), recent influenza diagnosed 6 days ago, p/w acute metabolic encephalopathy in setting of influenza, CAP, poss UTI, and poss coffee ground emesis. s/p IV Zosyn for UTI and CAP/Aspiration PNA. At baseline AOx4 and conversational; now at baseline w/ regards to mental status. 
74F c hx HTN, DM2, UC s/p colectomy and ileostomy, s/p CABG. CHF? GERD, pancreatic insufficiency, migraines, anxiety, venous insufficiency, CKD (baseline Cr ~1.8), recent influenza diagnosed 6 days ago, p/w acute metabolic encephalopathy in setting of influenza, CAP, poss UTI, and poss coffee ground emesis. On IV Zosyn for UTI and CAP/Aspiration PNA. At baseline AOx4 and conversational; now at baseline w/ regards to mental status. 
74F c hx HTN, DM2, UC s/p colectomy and ileostomy, s/p CABG. CHF? GERD, pancreatic insufficiency, migraines, anxiety, venous insufficiency, CKD (baseline Cr ~1.8), recent influenza diagnosed 6 days ago, p/w acute metabolic encephalopathy in setting of influenza, CAP, poss UTI, and poss coffee ground emesis. On IV Zosyn for UTI and CAP/Aspiration PNA. At baseline AOx4 and conversational; now at baseline w/ regards to mental status.
74F c hx HTN, DM2, UC s/p colectomy and ileostomy, s/p CABG. CHF? GERD, pancreatic insufficiency, migraines, anxiety, venous insufficiency, CKD (baseline Cr ~1.8), recent influenza diagnosed 6 days ago, p/w acute metabolic encephalopathy in setting of influenza, CAP, poss UTI, and poss coffee ground emesis. s/p IV Zosyn for UTI and CAP/Aspiration PNA. At baseline AOx4 and conversational; now at baseline w/ regards to mental status. Stable for discharge today.

## 2025-03-05 NOTE — PROGRESS NOTE ADULT - PROBLEM SELECTOR PLAN 9
w/ hx of HTN  - on home oral meds    Plan:   - hold home antiHTN meds i/s/o KARO  - hold home Lasix  - Amlodipine 10 for HTN
w/ hx of HTN  - on home oral meds    Plan:   - restart home antiHTN meds  - hold home Lasix
w/ hx of HTN  - on home oral meds    Plan:   - restart home antiHTN meds  - hold home Lasix

## 2025-03-05 NOTE — PROGRESS NOTE ADULT - PROBLEM SELECTOR PROBLEM 4
CAP (community acquired pneumonia)
Acute UTI
CAP (community acquired pneumonia)
Acute UTI
CAP (community acquired pneumonia)
Acute UTI
Acute UTI
CAP (community acquired pneumonia)
CAP (community acquired pneumonia)

## 2025-03-05 NOTE — PROGRESS NOTE ADULT - PROBLEM SELECTOR PROBLEM 5
Coffee ground emesis
Deep vein thrombosis (DVT)
Deep vein thrombosis (DVT)
Coffee ground emesis
Deep vein thrombosis (DVT)

## 2025-03-05 NOTE — PROGRESS NOTE ADULT - PROBLEM SELECTOR PLAN 6
Patient w/ AMS, possible 2.2 to acute UTI.   - UA w/ LEs, Bacteria, WBC    Plan:   - s/p Zosyn as above  - UCx 24 hours: NGTD
Hx of CABG x4 in 2017    Plan:   - Hold DAPT i/s/o NPO status and +/- hematemesis
Patient w/ AMS, possible 2.2 to acute UTI.   - UA w/ LEs, Bacteria, WBC    Plan:   - s/p Zosyn as above  - UCx 24 hours: NGTD
Patient w/ AMS, possible 2.2 to acute UTI.   - UA w/ LEs, Bacteria, WBC    Plan:   - s/p Zosyn as above  - UCx 24 hours: NGTD
Hx of CABG x4 in 2017    Plan:   - home ASA/Plavix

## 2025-03-20 ENCOUNTER — TRANSCRIPTION ENCOUNTER (OUTPATIENT)
Age: 75
End: 2025-03-20

## 2025-03-27 ENCOUNTER — TRANSCRIPTION ENCOUNTER (OUTPATIENT)
Age: 75
End: 2025-03-27

## 2025-07-19 ENCOUNTER — INPATIENT (INPATIENT)
Facility: HOSPITAL | Age: 75
LOS: 12 days | Discharge: NURSING, REHAB OR LONG-TERM CARE | DRG: 293 | End: 2025-08-01
Attending: INTERNAL MEDICINE | Admitting: INTERNAL MEDICINE
Payer: MEDICARE

## 2025-07-19 ENCOUNTER — TRANSCRIPTION ENCOUNTER (OUTPATIENT)
Age: 75
End: 2025-07-19

## 2025-07-19 VITALS
OXYGEN SATURATION: 96 % | RESPIRATION RATE: 20 BRPM | SYSTOLIC BLOOD PRESSURE: 158 MMHG | TEMPERATURE: 97 F | WEIGHT: 210.1 LBS | HEIGHT: 64 IN | DIASTOLIC BLOOD PRESSURE: 67 MMHG | HEART RATE: 90 BPM

## 2025-07-19 DIAGNOSIS — I50.9 HEART FAILURE, UNSPECIFIED: ICD-10-CM

## 2025-07-19 LAB
ADD ON TEST-SPECIMEN IN LAB: SIGNIFICANT CHANGE UP
ALBUMIN SERPL ELPH-MCNC: 3.1 G/DL — LOW (ref 3.3–5)
ALP SERPL-CCNC: 315 U/L — HIGH (ref 40–120)
ALT FLD-CCNC: 11 U/L — SIGNIFICANT CHANGE UP (ref 10–45)
ANION GAP SERPL CALC-SCNC: 14 MMOL/L — SIGNIFICANT CHANGE UP (ref 5–17)
APTT BLD: 32.5 SEC — SIGNIFICANT CHANGE UP (ref 26.1–36.8)
AST SERPL-CCNC: 20 U/L — SIGNIFICANT CHANGE UP (ref 10–40)
BASOPHILS # BLD AUTO: 0.05 K/UL — SIGNIFICANT CHANGE UP (ref 0–0.2)
BASOPHILS NFR BLD AUTO: 0.6 % — SIGNIFICANT CHANGE UP (ref 0–2)
BILIRUB SERPL-MCNC: 0.4 MG/DL — SIGNIFICANT CHANGE UP (ref 0.2–1.2)
BUN SERPL-MCNC: 25 MG/DL — HIGH (ref 7–23)
CALCIUM SERPL-MCNC: 8.8 MG/DL — SIGNIFICANT CHANGE UP (ref 8.4–10.5)
CHLORIDE SERPL-SCNC: 101 MMOL/L — SIGNIFICANT CHANGE UP (ref 96–108)
CO2 SERPL-SCNC: 22 MMOL/L — SIGNIFICANT CHANGE UP (ref 22–31)
CREAT SERPL-MCNC: 1.66 MG/DL — HIGH (ref 0.5–1.3)
EGFR: 32 ML/MIN/1.73M2 — LOW
EGFR: 32 ML/MIN/1.73M2 — LOW
EOSINOPHIL # BLD AUTO: 0.21 K/UL — SIGNIFICANT CHANGE UP (ref 0–0.5)
EOSINOPHIL NFR BLD AUTO: 2.6 % — SIGNIFICANT CHANGE UP (ref 0–6)
ERYTHROCYTE [SEDIMENTATION RATE] IN BLOOD: 115 MM/HR — HIGH (ref 0–20)
GAS PNL BLDV: SIGNIFICANT CHANGE UP
GLUCOSE BLDC GLUCOMTR-MCNC: 167 MG/DL — HIGH (ref 70–99)
GLUCOSE BLDC GLUCOMTR-MCNC: 170 MG/DL — HIGH (ref 70–99)
GLUCOSE SERPL-MCNC: 149 MG/DL — HIGH (ref 70–99)
HCT VFR BLD CALC: 27.7 % — LOW (ref 34.5–45)
HGB BLD-MCNC: 8.2 G/DL — LOW (ref 11.5–15.5)
IMM GRANULOCYTES # BLD AUTO: 0.04 K/UL — SIGNIFICANT CHANGE UP (ref 0–0.07)
IMM GRANULOCYTES NFR BLD AUTO: 0.5 % — SIGNIFICANT CHANGE UP (ref 0–0.9)
INR BLD: 1.38 RATIO — HIGH (ref 0.85–1.16)
LYMPHOCYTES # BLD AUTO: 0.87 K/UL — LOW (ref 1–3.3)
LYMPHOCYTES NFR BLD AUTO: 10.9 % — LOW (ref 13–44)
MCHC RBC-ENTMCNC: 28.5 PG — SIGNIFICANT CHANGE UP (ref 27–34)
MCHC RBC-ENTMCNC: 29.6 G/DL — LOW (ref 32–36)
MCV RBC AUTO: 96.2 FL — SIGNIFICANT CHANGE UP (ref 80–100)
MONOCYTES # BLD AUTO: 0.58 K/UL — SIGNIFICANT CHANGE UP (ref 0–0.9)
MONOCYTES NFR BLD AUTO: 7.3 % — SIGNIFICANT CHANGE UP (ref 2–14)
NEUTROPHILS # BLD AUTO: 6.23 K/UL — SIGNIFICANT CHANGE UP (ref 1.8–7.4)
NEUTROPHILS NFR BLD AUTO: 78.1 % — HIGH (ref 43–77)
NRBC # BLD AUTO: 0 K/UL — SIGNIFICANT CHANGE UP (ref 0–0)
NRBC # FLD: 0 K/UL — SIGNIFICANT CHANGE UP (ref 0–0)
NRBC BLD AUTO-RTO: 0 /100 WBCS — SIGNIFICANT CHANGE UP (ref 0–0)
PLATELET # BLD AUTO: 304 K/UL — SIGNIFICANT CHANGE UP (ref 150–400)
PMV BLD: 9.2 FL — SIGNIFICANT CHANGE UP (ref 7–13)
POTASSIUM SERPL-MCNC: 4.5 MMOL/L — SIGNIFICANT CHANGE UP (ref 3.5–5.3)
POTASSIUM SERPL-SCNC: 4.5 MMOL/L — SIGNIFICANT CHANGE UP (ref 3.5–5.3)
PROT SERPL-MCNC: 7.2 G/DL — SIGNIFICANT CHANGE UP (ref 6–8.3)
PROTHROM AB SERPL-ACNC: 15.8 SEC — HIGH (ref 9.9–13.4)
RBC # BLD: 2.88 M/UL — LOW (ref 3.8–5.2)
RBC # FLD: 17.6 % — HIGH (ref 10.3–14.5)
SODIUM SERPL-SCNC: 137 MMOL/L — SIGNIFICANT CHANGE UP (ref 135–145)
WBC # BLD: 7.98 K/UL — SIGNIFICANT CHANGE UP (ref 3.8–10.5)
WBC # FLD AUTO: 7.98 K/UL — SIGNIFICANT CHANGE UP (ref 3.8–10.5)

## 2025-07-19 PROCEDURE — 71045 X-RAY EXAM CHEST 1 VIEW: CPT

## 2025-07-19 PROCEDURE — 73060 X-RAY EXAM OF HUMERUS: CPT

## 2025-07-19 PROCEDURE — 85610 PROTHROMBIN TIME: CPT

## 2025-07-19 PROCEDURE — 84295 ASSAY OF SERUM SODIUM: CPT

## 2025-07-19 PROCEDURE — 86922 COMPATIBILITY TEST ANTIGLOB: CPT

## 2025-07-19 PROCEDURE — 86880 COOMBS TEST DIRECT: CPT

## 2025-07-19 PROCEDURE — 85652 RBC SED RATE AUTOMATED: CPT

## 2025-07-19 PROCEDURE — 82947 ASSAY GLUCOSE BLOOD QUANT: CPT

## 2025-07-19 PROCEDURE — 85014 HEMATOCRIT: CPT

## 2025-07-19 PROCEDURE — 85025 COMPLETE CBC W/AUTO DIFF WBC: CPT

## 2025-07-19 PROCEDURE — 73030 X-RAY EXAM OF SHOULDER: CPT | Mod: 26,LT

## 2025-07-19 PROCEDURE — 86850 RBC ANTIBODY SCREEN: CPT

## 2025-07-19 PROCEDURE — 86901 BLOOD TYPING SEROLOGIC RH(D): CPT

## 2025-07-19 PROCEDURE — 70486 CT MAXILLOFACIAL W/O DYE: CPT | Mod: 26

## 2025-07-19 PROCEDURE — 86870 RBC ANTIBODY IDENTIFICATION: CPT

## 2025-07-19 PROCEDURE — 82962 GLUCOSE BLOOD TEST: CPT

## 2025-07-19 PROCEDURE — 73590 X-RAY EXAM OF LOWER LEG: CPT | Mod: 26,50

## 2025-07-19 PROCEDURE — 86140 C-REACTIVE PROTEIN: CPT

## 2025-07-19 PROCEDURE — 72125 CT NECK SPINE W/O DYE: CPT | Mod: 26

## 2025-07-19 PROCEDURE — 76377 3D RENDER W/INTRP POSTPROCES: CPT

## 2025-07-19 PROCEDURE — 86905 BLOOD TYPING RBC ANTIGENS: CPT

## 2025-07-19 PROCEDURE — 86900 BLOOD TYPING SEROLOGIC ABO: CPT

## 2025-07-19 PROCEDURE — 73070 X-RAY EXAM OF ELBOW: CPT | Mod: 26,LT

## 2025-07-19 PROCEDURE — 73630 X-RAY EXAM OF FOOT: CPT | Mod: 26,50

## 2025-07-19 PROCEDURE — 80053 COMPREHEN METABOLIC PANEL: CPT

## 2025-07-19 PROCEDURE — 70450 CT HEAD/BRAIN W/O DYE: CPT | Mod: 26

## 2025-07-19 PROCEDURE — 93010 ELECTROCARDIOGRAM REPORT: CPT

## 2025-07-19 PROCEDURE — 99285 EMERGENCY DEPT VISIT HI MDM: CPT

## 2025-07-19 PROCEDURE — 85018 HEMOGLOBIN: CPT

## 2025-07-19 PROCEDURE — 73590 X-RAY EXAM OF LOWER LEG: CPT

## 2025-07-19 PROCEDURE — 73610 X-RAY EXAM OF ANKLE: CPT | Mod: 26,50

## 2025-07-19 PROCEDURE — 70450 CT HEAD/BRAIN W/O DYE: CPT

## 2025-07-19 PROCEDURE — 71045 X-RAY EXAM CHEST 1 VIEW: CPT | Mod: 26

## 2025-07-19 PROCEDURE — 84484 ASSAY OF TROPONIN QUANT: CPT

## 2025-07-19 PROCEDURE — 73070 X-RAY EXAM OF ELBOW: CPT

## 2025-07-19 PROCEDURE — 73630 X-RAY EXAM OF FOOT: CPT

## 2025-07-19 PROCEDURE — 73030 X-RAY EXAM OF SHOULDER: CPT

## 2025-07-19 PROCEDURE — 84132 ASSAY OF SERUM POTASSIUM: CPT

## 2025-07-19 PROCEDURE — 70486 CT MAXILLOFACIAL W/O DYE: CPT

## 2025-07-19 PROCEDURE — 83880 ASSAY OF NATRIURETIC PEPTIDE: CPT

## 2025-07-19 PROCEDURE — 73060 X-RAY EXAM OF HUMERUS: CPT | Mod: 26,LT

## 2025-07-19 PROCEDURE — 82803 BLOOD GASES ANY COMBINATION: CPT

## 2025-07-19 PROCEDURE — 82435 ASSAY OF BLOOD CHLORIDE: CPT

## 2025-07-19 PROCEDURE — 76377 3D RENDER W/INTRP POSTPROCES: CPT | Mod: 26

## 2025-07-19 PROCEDURE — 86077 PHYS BLOOD BANK SERV XMATCH: CPT

## 2025-07-19 PROCEDURE — 73610 X-RAY EXAM OF ANKLE: CPT

## 2025-07-19 PROCEDURE — 83605 ASSAY OF LACTIC ACID: CPT

## 2025-07-19 PROCEDURE — 72125 CT NECK SPINE W/O DYE: CPT

## 2025-07-19 PROCEDURE — 87040 BLOOD CULTURE FOR BACTERIA: CPT

## 2025-07-19 PROCEDURE — 82330 ASSAY OF CALCIUM: CPT

## 2025-07-19 PROCEDURE — 85730 THROMBOPLASTIN TIME PARTIAL: CPT

## 2025-07-19 RX ORDER — SIMETHICONE 80 MG
2 TABLET,CHEWABLE ORAL
Refills: 0 | DISCHARGE

## 2025-07-19 RX ORDER — PIPERACILLIN-TAZO-DEXTROSE,ISO 3.375G/5
3.38 IV SOLUTION, PIGGYBACK PREMIX FROZEN(ML) INTRAVENOUS ONCE
Refills: 0 | Status: COMPLETED | OUTPATIENT
Start: 2025-07-19 | End: 2025-07-19

## 2025-07-19 RX ORDER — LIDOCAINE HYDROCHLORIDE 20 MG/ML
1 JELLY TOPICAL ONCE
Refills: 0 | Status: COMPLETED | OUTPATIENT
Start: 2025-07-19 | End: 2025-07-19

## 2025-07-19 RX ORDER — CLOPIDOGREL BISULFATE 75 MG/1
75 TABLET, FILM COATED ORAL DAILY
Refills: 0 | Status: DISCONTINUED | OUTPATIENT
Start: 2025-07-19 | End: 2025-08-01

## 2025-07-19 RX ORDER — ACETAMINOPHEN 500 MG/5ML
1000 LIQUID (ML) ORAL ONCE
Refills: 0 | Status: COMPLETED | OUTPATIENT
Start: 2025-07-19 | End: 2025-07-19

## 2025-07-19 RX ORDER — INSULIN GLARGINE-YFGN 100 [IU]/ML
10 INJECTION, SOLUTION SUBCUTANEOUS AT BEDTIME
Refills: 0 | Status: DISCONTINUED | OUTPATIENT
Start: 2025-07-19 | End: 2025-08-01

## 2025-07-19 RX ORDER — FUROSEMIDE 10 MG/ML
40 INJECTION INTRAMUSCULAR; INTRAVENOUS ONCE
Refills: 0 | Status: COMPLETED | OUTPATIENT
Start: 2025-07-19 | End: 2025-07-19

## 2025-07-19 RX ORDER — ACETAMINOPHEN 500 MG/5ML
2 LIQUID (ML) ORAL
Refills: 0 | DISCHARGE

## 2025-07-19 RX ORDER — AMLODIPINE BESYLATE 10 MG/1
10 TABLET ORAL DAILY
Refills: 0 | Status: DISCONTINUED | OUTPATIENT
Start: 2025-07-19 | End: 2025-08-01

## 2025-07-19 RX ORDER — FERROUS SULFATE 137(45) MG
1 TABLET, EXTENDED RELEASE ORAL
Refills: 0 | DISCHARGE

## 2025-07-19 RX ORDER — SODIUM BICARBONATE 1 MEQ/ML
1300 SYRINGE (ML) INTRAVENOUS
Refills: 0 | Status: DISCONTINUED | OUTPATIENT
Start: 2025-07-19 | End: 2025-08-01

## 2025-07-19 RX ORDER — METOPROLOL SUCCINATE 50 MG/1
100 TABLET, EXTENDED RELEASE ORAL DAILY
Refills: 0 | Status: DISCONTINUED | OUTPATIENT
Start: 2025-07-19 | End: 2025-08-01

## 2025-07-19 RX ORDER — ATORVASTATIN CALCIUM 80 MG/1
10 TABLET, FILM COATED ORAL AT BEDTIME
Refills: 0 | Status: DISCONTINUED | OUTPATIENT
Start: 2025-07-19 | End: 2025-08-01

## 2025-07-19 RX ORDER — OLANZAPINE 10 MG/1
5 TABLET ORAL ONCE
Refills: 0 | Status: COMPLETED | OUTPATIENT
Start: 2025-07-19 | End: 2025-07-19

## 2025-07-19 RX ORDER — INSULIN LISPRO 100 U/ML
INJECTION, SOLUTION INTRAVENOUS; SUBCUTANEOUS
Refills: 0 | Status: DISCONTINUED | OUTPATIENT
Start: 2025-07-19 | End: 2025-07-25

## 2025-07-19 RX ORDER — DEXTROSE 50 % IN WATER 50 %
25 SYRINGE (ML) INTRAVENOUS ONCE
Refills: 0 | Status: DISCONTINUED | OUTPATIENT
Start: 2025-07-19 | End: 2025-08-01

## 2025-07-19 RX ORDER — GLUCAGON 3 MG/1
1 POWDER NASAL ONCE
Refills: 0 | Status: DISCONTINUED | OUTPATIENT
Start: 2025-07-19 | End: 2025-08-01

## 2025-07-19 RX ORDER — APIXABAN 5 MG/1
5 TABLET, FILM COATED ORAL EVERY 12 HOURS
Refills: 0 | Status: DISCONTINUED | OUTPATIENT
Start: 2025-07-19 | End: 2025-08-01

## 2025-07-19 RX ORDER — PIPERACILLIN-TAZO-DEXTROSE,ISO 3.375G/5
3.38 IV SOLUTION, PIGGYBACK PREMIX FROZEN(ML) INTRAVENOUS ONCE
Refills: 0 | Status: DISCONTINUED | OUTPATIENT
Start: 2025-07-19 | End: 2025-07-19

## 2025-07-19 RX ORDER — LIPASE/PROTEASE/AMYLASE 10K-37.5K
1 CAPSULE,DELAYED RELEASE (ENTERIC COATED) ORAL
Refills: 0 | DISCHARGE

## 2025-07-19 RX ORDER — CYANOCOBALAMIN 1000 UG/ML
1 INJECTION INTRAMUSCULAR; SUBCUTANEOUS
Refills: 0 | DISCHARGE

## 2025-07-19 RX ORDER — FOLIC ACID 1 MG/1
1 TABLET ORAL
Refills: 0 | DISCHARGE

## 2025-07-19 RX ORDER — SODIUM CHLORIDE 9 G/1000ML
1000 INJECTION, SOLUTION INTRAVENOUS
Refills: 0 | Status: DISCONTINUED | OUTPATIENT
Start: 2025-07-19 | End: 2025-08-01

## 2025-07-19 RX ORDER — DEXTROSE 50 % IN WATER 50 %
12.5 SYRINGE (ML) INTRAVENOUS ONCE
Refills: 0 | Status: DISCONTINUED | OUTPATIENT
Start: 2025-07-19 | End: 2025-08-01

## 2025-07-19 RX ORDER — SILVER SULFADIAZINE 1 %
1 CREAM (GRAM) TOPICAL
Refills: 0 | DISCHARGE

## 2025-07-19 RX ORDER — VANCOMYCIN HCL IN 5 % DEXTROSE 1.5G/250ML
1250 PLASTIC BAG, INJECTION (ML) INTRAVENOUS ONCE
Refills: 0 | Status: COMPLETED | OUTPATIENT
Start: 2025-07-19 | End: 2025-07-19

## 2025-07-19 RX ORDER — DEXTROSE 50 % IN WATER 50 %
15 SYRINGE (ML) INTRAVENOUS ONCE
Refills: 0 | Status: DISCONTINUED | OUTPATIENT
Start: 2025-07-19 | End: 2025-08-01

## 2025-07-19 RX ORDER — PIPERACILLIN-TAZO-DEXTROSE,ISO 3.375G/5
3.38 IV SOLUTION, PIGGYBACK PREMIX FROZEN(ML) INTRAVENOUS EVERY 8 HOURS
Refills: 0 | Status: DISCONTINUED | OUTPATIENT
Start: 2025-07-20 | End: 2025-07-20

## 2025-07-19 RX ORDER — GABAPENTIN 400 MG/1
100 CAPSULE ORAL THREE TIMES A DAY
Refills: 0 | Status: DISCONTINUED | OUTPATIENT
Start: 2025-07-19 | End: 2025-08-01

## 2025-07-19 RX ORDER — CADEXOMER IODINE 0.9 %
1 PADS, MEDICATED (EA) TOPICAL
Refills: 0 | Status: DISCONTINUED | OUTPATIENT
Start: 2025-07-19 | End: 2025-08-01

## 2025-07-19 RX ADMIN — Medication 25 GRAM(S): at 21:44

## 2025-07-19 RX ADMIN — Medication 400 MILLIGRAM(S): at 12:06

## 2025-07-19 RX ADMIN — INSULIN GLARGINE-YFGN 10 UNIT(S): 100 INJECTION, SOLUTION SUBCUTANEOUS at 23:40

## 2025-07-19 RX ADMIN — FUROSEMIDE 40 MILLIGRAM(S): 10 INJECTION INTRAMUSCULAR; INTRAVENOUS at 16:47

## 2025-07-19 RX ADMIN — Medication 200 GRAM(S): at 16:46

## 2025-07-19 RX ADMIN — GABAPENTIN 100 MILLIGRAM(S): 400 CAPSULE ORAL at 21:44

## 2025-07-19 RX ADMIN — Medication 166.67 MILLIGRAM(S): at 18:26

## 2025-07-19 RX ADMIN — ATORVASTATIN CALCIUM 10 MILLIGRAM(S): 80 TABLET, FILM COATED ORAL at 21:43

## 2025-07-19 RX ADMIN — OLANZAPINE 5 MILLIGRAM(S): 10 TABLET ORAL at 23:39

## 2025-07-19 RX ADMIN — Medication 400 MILLIGRAM(S): at 23:50

## 2025-07-19 RX ADMIN — Medication 1000 MILLIGRAM(S): at 22:28

## 2025-07-19 RX ADMIN — Medication 400 MILLIGRAM(S): at 21:44

## 2025-07-20 LAB
GLUCOSE BLDC GLUCOMTR-MCNC: 113 MG/DL — HIGH (ref 70–99)
GLUCOSE BLDC GLUCOMTR-MCNC: 151 MG/DL — HIGH (ref 70–99)
GLUCOSE BLDC GLUCOMTR-MCNC: 156 MG/DL — HIGH (ref 70–99)
GLUCOSE BLDC GLUCOMTR-MCNC: 170 MG/DL — HIGH (ref 70–99)

## 2025-07-20 PROCEDURE — 73630 X-RAY EXAM OF FOOT: CPT

## 2025-07-20 PROCEDURE — 73610 X-RAY EXAM OF ANKLE: CPT

## 2025-07-20 PROCEDURE — 70486 CT MAXILLOFACIAL W/O DYE: CPT

## 2025-07-20 PROCEDURE — 87040 BLOOD CULTURE FOR BACTERIA: CPT

## 2025-07-20 PROCEDURE — 83880 ASSAY OF NATRIURETIC PEPTIDE: CPT

## 2025-07-20 PROCEDURE — 83605 ASSAY OF LACTIC ACID: CPT

## 2025-07-20 PROCEDURE — 73590 X-RAY EXAM OF LOWER LEG: CPT

## 2025-07-20 PROCEDURE — 93970 EXTREMITY STUDY: CPT

## 2025-07-20 PROCEDURE — 85018 HEMOGLOBIN: CPT

## 2025-07-20 PROCEDURE — 85652 RBC SED RATE AUTOMATED: CPT

## 2025-07-20 PROCEDURE — 86901 BLOOD TYPING SEROLOGIC RH(D): CPT

## 2025-07-20 PROCEDURE — 84132 ASSAY OF SERUM POTASSIUM: CPT

## 2025-07-20 PROCEDURE — 73060 X-RAY EXAM OF HUMERUS: CPT

## 2025-07-20 PROCEDURE — 84484 ASSAY OF TROPONIN QUANT: CPT

## 2025-07-20 PROCEDURE — 99222 1ST HOSP IP/OBS MODERATE 55: CPT

## 2025-07-20 PROCEDURE — 86880 COOMBS TEST DIRECT: CPT

## 2025-07-20 PROCEDURE — 86905 BLOOD TYPING RBC ANTIGENS: CPT

## 2025-07-20 PROCEDURE — 82947 ASSAY GLUCOSE BLOOD QUANT: CPT

## 2025-07-20 PROCEDURE — 70450 CT HEAD/BRAIN W/O DYE: CPT

## 2025-07-20 PROCEDURE — 82435 ASSAY OF BLOOD CHLORIDE: CPT

## 2025-07-20 PROCEDURE — 86922 COMPATIBILITY TEST ANTIGLOB: CPT

## 2025-07-20 PROCEDURE — 85730 THROMBOPLASTIN TIME PARTIAL: CPT

## 2025-07-20 PROCEDURE — 82330 ASSAY OF CALCIUM: CPT

## 2025-07-20 PROCEDURE — 73070 X-RAY EXAM OF ELBOW: CPT

## 2025-07-20 PROCEDURE — 76377 3D RENDER W/INTRP POSTPROCES: CPT

## 2025-07-20 PROCEDURE — 93970 EXTREMITY STUDY: CPT | Mod: 26

## 2025-07-20 PROCEDURE — 86900 BLOOD TYPING SEROLOGIC ABO: CPT

## 2025-07-20 PROCEDURE — 85610 PROTHROMBIN TIME: CPT

## 2025-07-20 PROCEDURE — 82803 BLOOD GASES ANY COMBINATION: CPT

## 2025-07-20 PROCEDURE — 93005 ELECTROCARDIOGRAM TRACING: CPT

## 2025-07-20 PROCEDURE — 82962 GLUCOSE BLOOD TEST: CPT

## 2025-07-20 PROCEDURE — 73030 X-RAY EXAM OF SHOULDER: CPT

## 2025-07-20 PROCEDURE — 85025 COMPLETE CBC W/AUTO DIFF WBC: CPT

## 2025-07-20 PROCEDURE — 86140 C-REACTIVE PROTEIN: CPT

## 2025-07-20 PROCEDURE — 72125 CT NECK SPINE W/O DYE: CPT

## 2025-07-20 PROCEDURE — 86850 RBC ANTIBODY SCREEN: CPT

## 2025-07-20 PROCEDURE — 86870 RBC ANTIBODY IDENTIFICATION: CPT

## 2025-07-20 PROCEDURE — 71045 X-RAY EXAM CHEST 1 VIEW: CPT

## 2025-07-20 PROCEDURE — 84295 ASSAY OF SERUM SODIUM: CPT

## 2025-07-20 PROCEDURE — G0545: CPT

## 2025-07-20 PROCEDURE — 85014 HEMATOCRIT: CPT

## 2025-07-20 PROCEDURE — 80053 COMPREHEN METABOLIC PANEL: CPT

## 2025-07-20 RX ORDER — FUROSEMIDE 10 MG/ML
40 INJECTION INTRAMUSCULAR; INTRAVENOUS EVERY 12 HOURS
Refills: 0 | Status: DISCONTINUED | OUTPATIENT
Start: 2025-07-20 | End: 2025-07-22

## 2025-07-20 RX ORDER — ACETAMINOPHEN 500 MG/5ML
650 LIQUID (ML) ORAL EVERY 6 HOURS
Refills: 0 | Status: COMPLETED | OUTPATIENT
Start: 2025-07-20 | End: 2025-07-23

## 2025-07-20 RX ORDER — NYSTATIN 100000 [USP'U]/G
1 CREAM TOPICAL THREE TIMES A DAY
Refills: 0 | Status: COMPLETED | OUTPATIENT
Start: 2025-07-20 | End: 2025-07-25

## 2025-07-20 RX ORDER — SACUBITRIL AND VALSARTAN 6; 6 MG/1; MG/1
1 PELLET ORAL
Refills: 0 | Status: DISCONTINUED | OUTPATIENT
Start: 2025-07-20 | End: 2025-08-01

## 2025-07-20 RX ADMIN — INSULIN GLARGINE-YFGN 10 UNIT(S): 100 INJECTION, SOLUTION SUBCUTANEOUS at 21:23

## 2025-07-20 RX ADMIN — Medication 1300 MILLIGRAM(S): at 17:34

## 2025-07-20 RX ADMIN — NYSTATIN 1 APPLICATION(S): 100000 CREAM TOPICAL at 21:26

## 2025-07-20 RX ADMIN — ATORVASTATIN CALCIUM 10 MILLIGRAM(S): 80 TABLET, FILM COATED ORAL at 21:24

## 2025-07-20 RX ADMIN — LIDOCAINE HYDROCHLORIDE 1 PATCH: 20 JELLY TOPICAL at 11:46

## 2025-07-20 RX ADMIN — Medication 650 MILLIGRAM(S): at 20:17

## 2025-07-20 RX ADMIN — CLOPIDOGREL BISULFATE 75 MILLIGRAM(S): 75 TABLET, FILM COATED ORAL at 11:33

## 2025-07-20 RX ADMIN — INSULIN LISPRO 1: 100 INJECTION, SOLUTION INTRAVENOUS; SUBCUTANEOUS at 17:35

## 2025-07-20 RX ADMIN — Medication 25 GRAM(S): at 06:27

## 2025-07-20 RX ADMIN — APIXABAN 5 MILLIGRAM(S): 5 TABLET, FILM COATED ORAL at 06:28

## 2025-07-20 RX ADMIN — SACUBITRIL AND VALSARTAN 1 TABLET(S): 6; 6 PELLET ORAL at 13:59

## 2025-07-20 RX ADMIN — FUROSEMIDE 40 MILLIGRAM(S): 10 INJECTION INTRAMUSCULAR; INTRAVENOUS at 13:59

## 2025-07-20 RX ADMIN — Medication 1300 MILLIGRAM(S): at 06:28

## 2025-07-20 RX ADMIN — NYSTATIN 1 APPLICATION(S): 100000 CREAM TOPICAL at 17:34

## 2025-07-20 RX ADMIN — METOPROLOL SUCCINATE 100 MILLIGRAM(S): 50 TABLET, EXTENDED RELEASE ORAL at 06:28

## 2025-07-20 RX ADMIN — Medication 1000 MILLIGRAM(S): at 01:00

## 2025-07-20 RX ADMIN — AMLODIPINE BESYLATE 10 MILLIGRAM(S): 10 TABLET ORAL at 06:28

## 2025-07-20 RX ADMIN — LIDOCAINE HYDROCHLORIDE 1 PATCH: 20 JELLY TOPICAL at 00:04

## 2025-07-20 RX ADMIN — GABAPENTIN 100 MILLIGRAM(S): 400 CAPSULE ORAL at 13:07

## 2025-07-20 RX ADMIN — Medication 20 MILLIGRAM(S): at 11:33

## 2025-07-20 RX ADMIN — Medication 650 MILLIGRAM(S): at 21:40

## 2025-07-20 RX ADMIN — GABAPENTIN 100 MILLIGRAM(S): 400 CAPSULE ORAL at 21:23

## 2025-07-20 RX ADMIN — Medication 1 APPLICATION(S): at 09:01

## 2025-07-20 RX ADMIN — GABAPENTIN 100 MILLIGRAM(S): 400 CAPSULE ORAL at 06:28

## 2025-07-20 RX ADMIN — INSULIN LISPRO 1: 100 INJECTION, SOLUTION INTRAVENOUS; SUBCUTANEOUS at 13:07

## 2025-07-20 RX ADMIN — LIDOCAINE HYDROCHLORIDE 1 PATCH: 20 JELLY TOPICAL at 07:00

## 2025-07-20 RX ADMIN — APIXABAN 5 MILLIGRAM(S): 5 TABLET, FILM COATED ORAL at 17:35

## 2025-07-21 LAB
ALBUMIN SERPL ELPH-MCNC: 2.7 G/DL — LOW (ref 3.3–5)
ALP SERPL-CCNC: 268 U/L — HIGH (ref 40–120)
ALT FLD-CCNC: 8 U/L — LOW (ref 10–45)
ANION GAP SERPL CALC-SCNC: 13 MMOL/L — SIGNIFICANT CHANGE UP (ref 5–17)
AST SERPL-CCNC: 17 U/L — SIGNIFICANT CHANGE UP (ref 10–40)
BILIRUB SERPL-MCNC: 0.3 MG/DL — SIGNIFICANT CHANGE UP (ref 0.2–1.2)
BUN SERPL-MCNC: 29 MG/DL — HIGH (ref 7–23)
CALCIUM SERPL-MCNC: 7.9 MG/DL — LOW (ref 8.4–10.5)
CHLORIDE SERPL-SCNC: 105 MMOL/L — SIGNIFICANT CHANGE UP (ref 96–108)
CO2 SERPL-SCNC: 24 MMOL/L — SIGNIFICANT CHANGE UP (ref 22–31)
CREAT SERPL-MCNC: 1.87 MG/DL — HIGH (ref 0.5–1.3)
EGFR: 28 ML/MIN/1.73M2 — LOW
EGFR: 28 ML/MIN/1.73M2 — LOW
GLUCOSE BLDC GLUCOMTR-MCNC: 104 MG/DL — HIGH (ref 70–99)
GLUCOSE BLDC GLUCOMTR-MCNC: 159 MG/DL — HIGH (ref 70–99)
GLUCOSE BLDC GLUCOMTR-MCNC: 172 MG/DL — HIGH (ref 70–99)
GLUCOSE BLDC GLUCOMTR-MCNC: 244 MG/DL — HIGH (ref 70–99)
GLUCOSE SERPL-MCNC: 115 MG/DL — HIGH (ref 70–99)
HCT VFR BLD CALC: 26.6 % — LOW (ref 34.5–45)
HGB BLD-MCNC: 7.9 G/DL — LOW (ref 11.5–15.5)
MAGNESIUM SERPL-MCNC: 1.9 MG/DL — SIGNIFICANT CHANGE UP (ref 1.6–2.6)
MCHC RBC-ENTMCNC: 28.5 PG — SIGNIFICANT CHANGE UP (ref 27–34)
MCHC RBC-ENTMCNC: 29.7 G/DL — LOW (ref 32–36)
MCV RBC AUTO: 96 FL — SIGNIFICANT CHANGE UP (ref 80–100)
NRBC # BLD AUTO: 0 K/UL — SIGNIFICANT CHANGE UP (ref 0–0)
NRBC # FLD: 0 K/UL — SIGNIFICANT CHANGE UP (ref 0–0)
NRBC BLD AUTO-RTO: 0 /100 WBCS — SIGNIFICANT CHANGE UP (ref 0–0)
PHOSPHATE SERPL-MCNC: 3.6 MG/DL — SIGNIFICANT CHANGE UP (ref 2.5–4.5)
PLATELET # BLD AUTO: 321 K/UL — SIGNIFICANT CHANGE UP (ref 150–400)
PMV BLD: 9.8 FL — SIGNIFICANT CHANGE UP (ref 7–13)
POTASSIUM SERPL-MCNC: 3.6 MMOL/L — SIGNIFICANT CHANGE UP (ref 3.5–5.3)
POTASSIUM SERPL-SCNC: 3.6 MMOL/L — SIGNIFICANT CHANGE UP (ref 3.5–5.3)
PROT SERPL-MCNC: 6.5 G/DL — SIGNIFICANT CHANGE UP (ref 6–8.3)
RBC # BLD: 2.77 M/UL — LOW (ref 3.8–5.2)
RBC # FLD: 18.2 % — HIGH (ref 10.3–14.5)
SODIUM SERPL-SCNC: 142 MMOL/L — SIGNIFICANT CHANGE UP (ref 135–145)
WBC # BLD: 6.83 K/UL — SIGNIFICANT CHANGE UP (ref 3.8–10.5)
WBC # FLD AUTO: 6.83 K/UL — SIGNIFICANT CHANGE UP (ref 3.8–10.5)

## 2025-07-21 PROCEDURE — 84132 ASSAY OF SERUM POTASSIUM: CPT

## 2025-07-21 PROCEDURE — 86922 COMPATIBILITY TEST ANTIGLOB: CPT

## 2025-07-21 PROCEDURE — 70450 CT HEAD/BRAIN W/O DYE: CPT

## 2025-07-21 PROCEDURE — 83880 ASSAY OF NATRIURETIC PEPTIDE: CPT

## 2025-07-21 PROCEDURE — 82947 ASSAY GLUCOSE BLOOD QUANT: CPT

## 2025-07-21 PROCEDURE — 86900 BLOOD TYPING SEROLOGIC ABO: CPT

## 2025-07-21 PROCEDURE — 85027 COMPLETE CBC AUTOMATED: CPT

## 2025-07-21 PROCEDURE — 86140 C-REACTIVE PROTEIN: CPT

## 2025-07-21 PROCEDURE — 84484 ASSAY OF TROPONIN QUANT: CPT

## 2025-07-21 PROCEDURE — 70486 CT MAXILLOFACIAL W/O DYE: CPT

## 2025-07-21 PROCEDURE — 80053 COMPREHEN METABOLIC PANEL: CPT

## 2025-07-21 PROCEDURE — 76377 3D RENDER W/INTRP POSTPROCES: CPT

## 2025-07-21 PROCEDURE — 82962 GLUCOSE BLOOD TEST: CPT

## 2025-07-21 PROCEDURE — 86880 COOMBS TEST DIRECT: CPT

## 2025-07-21 PROCEDURE — 85014 HEMATOCRIT: CPT

## 2025-07-21 PROCEDURE — 73590 X-RAY EXAM OF LOWER LEG: CPT

## 2025-07-21 PROCEDURE — 73630 X-RAY EXAM OF FOOT: CPT

## 2025-07-21 PROCEDURE — 85610 PROTHROMBIN TIME: CPT

## 2025-07-21 PROCEDURE — 71045 X-RAY EXAM CHEST 1 VIEW: CPT

## 2025-07-21 PROCEDURE — 72125 CT NECK SPINE W/O DYE: CPT

## 2025-07-21 PROCEDURE — 82435 ASSAY OF BLOOD CHLORIDE: CPT

## 2025-07-21 PROCEDURE — 73610 X-RAY EXAM OF ANKLE: CPT

## 2025-07-21 PROCEDURE — 82803 BLOOD GASES ANY COMBINATION: CPT

## 2025-07-21 PROCEDURE — 86850 RBC ANTIBODY SCREEN: CPT

## 2025-07-21 PROCEDURE — 84295 ASSAY OF SERUM SODIUM: CPT

## 2025-07-21 PROCEDURE — 85025 COMPLETE CBC W/AUTO DIFF WBC: CPT

## 2025-07-21 PROCEDURE — 86870 RBC ANTIBODY IDENTIFICATION: CPT

## 2025-07-21 PROCEDURE — 83735 ASSAY OF MAGNESIUM: CPT

## 2025-07-21 PROCEDURE — 85652 RBC SED RATE AUTOMATED: CPT

## 2025-07-21 PROCEDURE — G0545: CPT

## 2025-07-21 PROCEDURE — 99232 SBSQ HOSP IP/OBS MODERATE 35: CPT

## 2025-07-21 PROCEDURE — 86905 BLOOD TYPING RBC ANTIGENS: CPT

## 2025-07-21 PROCEDURE — 73060 X-RAY EXAM OF HUMERUS: CPT

## 2025-07-21 PROCEDURE — 83605 ASSAY OF LACTIC ACID: CPT

## 2025-07-21 PROCEDURE — 73070 X-RAY EXAM OF ELBOW: CPT

## 2025-07-21 PROCEDURE — 84100 ASSAY OF PHOSPHORUS: CPT

## 2025-07-21 PROCEDURE — 93970 EXTREMITY STUDY: CPT

## 2025-07-21 PROCEDURE — 85018 HEMOGLOBIN: CPT

## 2025-07-21 PROCEDURE — 86901 BLOOD TYPING SEROLOGIC RH(D): CPT

## 2025-07-21 PROCEDURE — 93005 ELECTROCARDIOGRAM TRACING: CPT

## 2025-07-21 PROCEDURE — 73030 X-RAY EXAM OF SHOULDER: CPT

## 2025-07-21 PROCEDURE — 85730 THROMBOPLASTIN TIME PARTIAL: CPT

## 2025-07-21 PROCEDURE — 82330 ASSAY OF CALCIUM: CPT

## 2025-07-21 PROCEDURE — 87040 BLOOD CULTURE FOR BACTERIA: CPT

## 2025-07-21 PROCEDURE — 97162 PT EVAL MOD COMPLEX 30 MIN: CPT

## 2025-07-21 RX ORDER — POLYETHYLENE GLYCOL 3350 17 G/17G
17 POWDER, FOR SOLUTION ORAL DAILY
Refills: 0 | Status: DISCONTINUED | OUTPATIENT
Start: 2025-07-21 | End: 2025-08-01

## 2025-07-21 RX ORDER — B1/B2/B3/B5/B6/B12/VIT C/FOLIC 500-0.5 MG
1 TABLET ORAL DAILY
Refills: 0 | Status: DISCONTINUED | OUTPATIENT
Start: 2025-07-21 | End: 2025-08-01

## 2025-07-21 RX ORDER — POLYETHYLENE GLYCOL 3350 17 G/17G
17 POWDER, FOR SOLUTION ORAL DAILY
Refills: 0 | Status: DISCONTINUED | OUTPATIENT
Start: 2025-07-21 | End: 2025-07-21

## 2025-07-21 RX ORDER — SENNA 187 MG
2 TABLET ORAL AT BEDTIME
Refills: 0 | Status: DISCONTINUED | OUTPATIENT
Start: 2025-07-21 | End: 2025-07-21

## 2025-07-21 RX ORDER — HYDROMORPHONE/SOD CHLOR,ISO/PF 2 MG/10 ML
0.2 SYRINGE (ML) INJECTION ONCE
Refills: 0 | Status: DISCONTINUED | OUTPATIENT
Start: 2025-07-21 | End: 2025-07-21

## 2025-07-21 RX ADMIN — SACUBITRIL AND VALSARTAN 1 TABLET(S): 6; 6 PELLET ORAL at 06:09

## 2025-07-21 RX ADMIN — NYSTATIN 1 APPLICATION(S): 100000 CREAM TOPICAL at 21:24

## 2025-07-21 RX ADMIN — Medication 1 TABLET(S): at 17:49

## 2025-07-21 RX ADMIN — FUROSEMIDE 40 MILLIGRAM(S): 10 INJECTION INTRAMUSCULAR; INTRAVENOUS at 06:07

## 2025-07-21 RX ADMIN — FUROSEMIDE 40 MILLIGRAM(S): 10 INJECTION INTRAMUSCULAR; INTRAVENOUS at 17:45

## 2025-07-21 RX ADMIN — APIXABAN 5 MILLIGRAM(S): 5 TABLET, FILM COATED ORAL at 17:45

## 2025-07-21 RX ADMIN — Medication 0.2 MILLIGRAM(S): at 00:31

## 2025-07-21 RX ADMIN — SACUBITRIL AND VALSARTAN 1 TABLET(S): 6; 6 PELLET ORAL at 17:45

## 2025-07-21 RX ADMIN — GABAPENTIN 100 MILLIGRAM(S): 400 CAPSULE ORAL at 21:23

## 2025-07-21 RX ADMIN — GABAPENTIN 100 MILLIGRAM(S): 400 CAPSULE ORAL at 13:19

## 2025-07-21 RX ADMIN — APIXABAN 5 MILLIGRAM(S): 5 TABLET, FILM COATED ORAL at 06:07

## 2025-07-21 RX ADMIN — INSULIN GLARGINE-YFGN 10 UNIT(S): 100 INJECTION, SOLUTION SUBCUTANEOUS at 21:24

## 2025-07-21 RX ADMIN — AMLODIPINE BESYLATE 10 MILLIGRAM(S): 10 TABLET ORAL at 06:07

## 2025-07-21 RX ADMIN — FUROSEMIDE 40 MILLIGRAM(S): 10 INJECTION INTRAMUSCULAR; INTRAVENOUS at 00:16

## 2025-07-21 RX ADMIN — CLOPIDOGREL BISULFATE 75 MILLIGRAM(S): 75 TABLET, FILM COATED ORAL at 11:49

## 2025-07-21 RX ADMIN — Medication 650 MILLIGRAM(S): at 19:30

## 2025-07-21 RX ADMIN — Medication 650 MILLIGRAM(S): at 20:00

## 2025-07-21 RX ADMIN — Medication 20 MILLIGRAM(S): at 11:49

## 2025-07-21 RX ADMIN — NYSTATIN 1 APPLICATION(S): 100000 CREAM TOPICAL at 13:20

## 2025-07-21 RX ADMIN — Medication 1300 MILLIGRAM(S): at 17:45

## 2025-07-21 RX ADMIN — Medication 650 MILLIGRAM(S): at 06:40

## 2025-07-21 RX ADMIN — ATORVASTATIN CALCIUM 10 MILLIGRAM(S): 80 TABLET, FILM COATED ORAL at 21:24

## 2025-07-21 RX ADMIN — NYSTATIN 1 APPLICATION(S): 100000 CREAM TOPICAL at 06:08

## 2025-07-21 RX ADMIN — Medication 0.2 MILLIGRAM(S): at 23:55

## 2025-07-21 RX ADMIN — SACUBITRIL AND VALSARTAN 1 TABLET(S): 6; 6 PELLET ORAL at 00:16

## 2025-07-21 RX ADMIN — INSULIN LISPRO 1: 100 INJECTION, SOLUTION INTRAVENOUS; SUBCUTANEOUS at 17:46

## 2025-07-21 RX ADMIN — GABAPENTIN 100 MILLIGRAM(S): 400 CAPSULE ORAL at 06:28

## 2025-07-21 RX ADMIN — Medication 1 APPLICATION(S): at 10:39

## 2025-07-21 RX ADMIN — INSULIN LISPRO 1: 100 INJECTION, SOLUTION INTRAVENOUS; SUBCUTANEOUS at 12:46

## 2025-07-21 RX ADMIN — Medication 1300 MILLIGRAM(S): at 06:07

## 2025-07-21 RX ADMIN — METOPROLOL SUCCINATE 100 MILLIGRAM(S): 50 TABLET, EXTENDED RELEASE ORAL at 06:07

## 2025-07-22 ENCOUNTER — RESULT REVIEW (OUTPATIENT)
Age: 75
End: 2025-07-22

## 2025-07-22 LAB
ANION GAP SERPL CALC-SCNC: 13 MMOL/L — SIGNIFICANT CHANGE UP (ref 5–17)
BUN SERPL-MCNC: 31 MG/DL — HIGH (ref 7–23)
CALCIUM SERPL-MCNC: 7.7 MG/DL — LOW (ref 8.4–10.5)
CHLORIDE SERPL-SCNC: 103 MMOL/L — SIGNIFICANT CHANGE UP (ref 96–108)
CO2 SERPL-SCNC: 24 MMOL/L — SIGNIFICANT CHANGE UP (ref 22–31)
CREAT SERPL-MCNC: 1.97 MG/DL — HIGH (ref 0.5–1.3)
EGFR: 26 ML/MIN/1.73M2 — LOW
EGFR: 26 ML/MIN/1.73M2 — LOW
GLUCOSE BLDC GLUCOMTR-MCNC: 146 MG/DL — HIGH (ref 70–99)
GLUCOSE BLDC GLUCOMTR-MCNC: 174 MG/DL — HIGH (ref 70–99)
GLUCOSE BLDC GLUCOMTR-MCNC: 181 MG/DL — HIGH (ref 70–99)
GLUCOSE BLDC GLUCOMTR-MCNC: 275 MG/DL — HIGH (ref 70–99)
GLUCOSE SERPL-MCNC: 130 MG/DL — HIGH (ref 70–99)
HCT VFR BLD CALC: 29.2 % — LOW (ref 34.5–45)
HGB BLD-MCNC: 8.6 G/DL — LOW (ref 11.5–15.5)
MAGNESIUM SERPL-MCNC: 1.9 MG/DL — SIGNIFICANT CHANGE UP (ref 1.6–2.6)
MCHC RBC-ENTMCNC: 28.4 PG — SIGNIFICANT CHANGE UP (ref 27–34)
MCHC RBC-ENTMCNC: 29.5 G/DL — LOW (ref 32–36)
MCV RBC AUTO: 96.4 FL — SIGNIFICANT CHANGE UP (ref 80–100)
NRBC # BLD AUTO: 0 K/UL — SIGNIFICANT CHANGE UP (ref 0–0)
NRBC # FLD: 0 K/UL — SIGNIFICANT CHANGE UP (ref 0–0)
NRBC BLD AUTO-RTO: 0 /100 WBCS — SIGNIFICANT CHANGE UP (ref 0–0)
PLATELET # BLD AUTO: 337 K/UL — SIGNIFICANT CHANGE UP (ref 150–400)
PMV BLD: 9.9 FL — SIGNIFICANT CHANGE UP (ref 7–13)
POTASSIUM SERPL-MCNC: 4.1 MMOL/L — SIGNIFICANT CHANGE UP (ref 3.5–5.3)
POTASSIUM SERPL-SCNC: 4.1 MMOL/L — SIGNIFICANT CHANGE UP (ref 3.5–5.3)
RBC # BLD: 3.03 M/UL — LOW (ref 3.8–5.2)
RBC # FLD: 18.5 % — HIGH (ref 10.3–14.5)
SODIUM SERPL-SCNC: 140 MMOL/L — SIGNIFICANT CHANGE UP (ref 135–145)
WBC # BLD: 8.21 K/UL — SIGNIFICANT CHANGE UP (ref 3.8–10.5)
WBC # FLD AUTO: 8.21 K/UL — SIGNIFICANT CHANGE UP (ref 3.8–10.5)

## 2025-07-22 PROCEDURE — 93306 TTE W/DOPPLER COMPLETE: CPT | Mod: 26

## 2025-07-22 RX ORDER — FUROSEMIDE 10 MG/ML
40 INJECTION INTRAMUSCULAR; INTRAVENOUS DAILY
Refills: 0 | Status: DISCONTINUED | OUTPATIENT
Start: 2025-07-22 | End: 2025-07-23

## 2025-07-22 RX ORDER — ACETAMINOPHEN 500 MG/5ML
1000 LIQUID (ML) ORAL ONCE
Refills: 0 | Status: COMPLETED | OUTPATIENT
Start: 2025-07-22 | End: 2025-07-22

## 2025-07-22 RX ORDER — HYDROMORPHONE/SOD CHLOR,ISO/PF 2 MG/10 ML
0.2 SYRINGE (ML) INJECTION ONCE
Refills: 0 | Status: DISCONTINUED | OUTPATIENT
Start: 2025-07-22 | End: 2025-07-23

## 2025-07-22 RX ADMIN — INSULIN LISPRO 1: 100 INJECTION, SOLUTION INTRAVENOUS; SUBCUTANEOUS at 12:49

## 2025-07-22 RX ADMIN — METOPROLOL SUCCINATE 100 MILLIGRAM(S): 50 TABLET, EXTENDED RELEASE ORAL at 05:22

## 2025-07-22 RX ADMIN — Medication 20 MILLIGRAM(S): at 12:19

## 2025-07-22 RX ADMIN — NYSTATIN 1 APPLICATION(S): 100000 CREAM TOPICAL at 05:23

## 2025-07-22 RX ADMIN — SACUBITRIL AND VALSARTAN 1 TABLET(S): 6; 6 PELLET ORAL at 17:17

## 2025-07-22 RX ADMIN — Medication 1 TABLET(S): at 12:20

## 2025-07-22 RX ADMIN — FUROSEMIDE 40 MILLIGRAM(S): 10 INJECTION INTRAMUSCULAR; INTRAVENOUS at 05:23

## 2025-07-22 RX ADMIN — INSULIN LISPRO 1: 100 INJECTION, SOLUTION INTRAVENOUS; SUBCUTANEOUS at 17:18

## 2025-07-22 RX ADMIN — Medication 1300 MILLIGRAM(S): at 17:18

## 2025-07-22 RX ADMIN — Medication 1000 MILLIGRAM(S): at 04:01

## 2025-07-22 RX ADMIN — Medication 1300 MILLIGRAM(S): at 05:22

## 2025-07-22 RX ADMIN — NYSTATIN 1 APPLICATION(S): 100000 CREAM TOPICAL at 13:20

## 2025-07-22 RX ADMIN — GABAPENTIN 100 MILLIGRAM(S): 400 CAPSULE ORAL at 13:20

## 2025-07-22 RX ADMIN — AMLODIPINE BESYLATE 10 MILLIGRAM(S): 10 TABLET ORAL at 05:22

## 2025-07-22 RX ADMIN — Medication 0.2 MILLIGRAM(S): at 00:00

## 2025-07-22 RX ADMIN — CLOPIDOGREL BISULFATE 75 MILLIGRAM(S): 75 TABLET, FILM COATED ORAL at 12:19

## 2025-07-22 RX ADMIN — NYSTATIN 1 APPLICATION(S): 100000 CREAM TOPICAL at 21:49

## 2025-07-22 RX ADMIN — GABAPENTIN 100 MILLIGRAM(S): 400 CAPSULE ORAL at 21:48

## 2025-07-22 RX ADMIN — Medication 400 MILLIGRAM(S): at 03:30

## 2025-07-22 RX ADMIN — SACUBITRIL AND VALSARTAN 1 TABLET(S): 6; 6 PELLET ORAL at 05:22

## 2025-07-22 RX ADMIN — APIXABAN 5 MILLIGRAM(S): 5 TABLET, FILM COATED ORAL at 05:22

## 2025-07-22 RX ADMIN — APIXABAN 5 MILLIGRAM(S): 5 TABLET, FILM COATED ORAL at 17:18

## 2025-07-22 RX ADMIN — INSULIN GLARGINE-YFGN 10 UNIT(S): 100 INJECTION, SOLUTION SUBCUTANEOUS at 21:48

## 2025-07-22 RX ADMIN — Medication 650 MILLIGRAM(S): at 19:05

## 2025-07-22 RX ADMIN — GABAPENTIN 100 MILLIGRAM(S): 400 CAPSULE ORAL at 05:22

## 2025-07-22 RX ADMIN — ATORVASTATIN CALCIUM 10 MILLIGRAM(S): 80 TABLET, FILM COATED ORAL at 21:48

## 2025-07-22 NOTE — PHYSICAL THERAPY INITIAL EVALUATION ADULT - REHAB POTENTIAL, PT EVAL
- Use knee brace with activity as needed for comfort  -Use topical diclofenac (Voltaren gel) on the knee up to 4 times daily as needed for pain  -Continue with regular physical activity and home exercise regimen  -It is okay to repeat injections as often as every 3 months if needed based on discomfort   good, to achieve stated therapy goals

## 2025-07-23 DIAGNOSIS — Z51.5 ENCOUNTER FOR PALLIATIVE CARE: ICD-10-CM

## 2025-07-23 DIAGNOSIS — F03.90 UNSPECIFIED DEMENTIA, UNSPECIFIED SEVERITY, WITHOUT BEHAVIORAL DISTURBANCE, PSYCHOTIC DISTURBANCE, MOOD DISTURBANCE, AND ANXIETY: ICD-10-CM

## 2025-07-23 DIAGNOSIS — G93.41 METABOLIC ENCEPHALOPATHY: ICD-10-CM

## 2025-07-23 DIAGNOSIS — I50.9 HEART FAILURE, UNSPECIFIED: ICD-10-CM

## 2025-07-23 LAB
ANION GAP SERPL CALC-SCNC: 11 MMOL/L — SIGNIFICANT CHANGE UP (ref 5–17)
BUN SERPL-MCNC: 36 MG/DL — HIGH (ref 7–23)
CALCIUM SERPL-MCNC: 7.8 MG/DL — LOW (ref 8.4–10.5)
CHLORIDE SERPL-SCNC: 106 MMOL/L — SIGNIFICANT CHANGE UP (ref 96–108)
CO2 SERPL-SCNC: 25 MMOL/L — SIGNIFICANT CHANGE UP (ref 22–31)
CREAT SERPL-MCNC: 1.81 MG/DL — HIGH (ref 0.5–1.3)
EGFR: 29 ML/MIN/1.73M2 — LOW
EGFR: 29 ML/MIN/1.73M2 — LOW
GLUCOSE BLDC GLUCOMTR-MCNC: 167 MG/DL — HIGH (ref 70–99)
GLUCOSE BLDC GLUCOMTR-MCNC: 199 MG/DL — HIGH (ref 70–99)
GLUCOSE BLDC GLUCOMTR-MCNC: 213 MG/DL — HIGH (ref 70–99)
GLUCOSE BLDC GLUCOMTR-MCNC: 274 MG/DL — HIGH (ref 70–99)
GLUCOSE SERPL-MCNC: 140 MG/DL — HIGH (ref 70–99)
POTASSIUM SERPL-MCNC: 4.6 MMOL/L — SIGNIFICANT CHANGE UP (ref 3.5–5.3)
POTASSIUM SERPL-SCNC: 4.6 MMOL/L — SIGNIFICANT CHANGE UP (ref 3.5–5.3)
SODIUM SERPL-SCNC: 142 MMOL/L — SIGNIFICANT CHANGE UP (ref 135–145)

## 2025-07-23 PROCEDURE — 73590 X-RAY EXAM OF LOWER LEG: CPT

## 2025-07-23 PROCEDURE — 85027 COMPLETE CBC AUTOMATED: CPT

## 2025-07-23 PROCEDURE — 70450 CT HEAD/BRAIN W/O DYE: CPT

## 2025-07-23 PROCEDURE — 73070 X-RAY EXAM OF ELBOW: CPT

## 2025-07-23 PROCEDURE — 73610 X-RAY EXAM OF ANKLE: CPT

## 2025-07-23 PROCEDURE — 85610 PROTHROMBIN TIME: CPT

## 2025-07-23 PROCEDURE — 71045 X-RAY EXAM CHEST 1 VIEW: CPT

## 2025-07-23 PROCEDURE — 76377 3D RENDER W/INTRP POSTPROCES: CPT

## 2025-07-23 PROCEDURE — 86900 BLOOD TYPING SEROLOGIC ABO: CPT

## 2025-07-23 PROCEDURE — 93923 UPR/LXTR ART STDY 3+ LVLS: CPT

## 2025-07-23 PROCEDURE — 85652 RBC SED RATE AUTOMATED: CPT

## 2025-07-23 PROCEDURE — 36415 COLL VENOUS BLD VENIPUNCTURE: CPT

## 2025-07-23 PROCEDURE — 70486 CT MAXILLOFACIAL W/O DYE: CPT

## 2025-07-23 PROCEDURE — 85018 HEMOGLOBIN: CPT

## 2025-07-23 PROCEDURE — 86140 C-REACTIVE PROTEIN: CPT

## 2025-07-23 PROCEDURE — 85014 HEMATOCRIT: CPT

## 2025-07-23 PROCEDURE — 84295 ASSAY OF SERUM SODIUM: CPT

## 2025-07-23 PROCEDURE — 85025 COMPLETE CBC W/AUTO DIFF WBC: CPT

## 2025-07-23 PROCEDURE — 73630 X-RAY EXAM OF FOOT: CPT

## 2025-07-23 PROCEDURE — 86870 RBC ANTIBODY IDENTIFICATION: CPT

## 2025-07-23 PROCEDURE — 84100 ASSAY OF PHOSPHORUS: CPT

## 2025-07-23 PROCEDURE — 83605 ASSAY OF LACTIC ACID: CPT

## 2025-07-23 PROCEDURE — 99222 1ST HOSP IP/OBS MODERATE 55: CPT | Mod: GC

## 2025-07-23 PROCEDURE — 73060 X-RAY EXAM OF HUMERUS: CPT

## 2025-07-23 PROCEDURE — 82435 ASSAY OF BLOOD CHLORIDE: CPT

## 2025-07-23 PROCEDURE — 86850 RBC ANTIBODY SCREEN: CPT

## 2025-07-23 PROCEDURE — 82947 ASSAY GLUCOSE BLOOD QUANT: CPT

## 2025-07-23 PROCEDURE — 97162 PT EVAL MOD COMPLEX 30 MIN: CPT

## 2025-07-23 PROCEDURE — 73030 X-RAY EXAM OF SHOULDER: CPT

## 2025-07-23 PROCEDURE — 84132 ASSAY OF SERUM POTASSIUM: CPT

## 2025-07-23 PROCEDURE — 93970 EXTREMITY STUDY: CPT

## 2025-07-23 PROCEDURE — 82330 ASSAY OF CALCIUM: CPT

## 2025-07-23 PROCEDURE — 86901 BLOOD TYPING SEROLOGIC RH(D): CPT

## 2025-07-23 PROCEDURE — 93005 ELECTROCARDIOGRAM TRACING: CPT

## 2025-07-23 PROCEDURE — 83880 ASSAY OF NATRIURETIC PEPTIDE: CPT

## 2025-07-23 PROCEDURE — 86922 COMPATIBILITY TEST ANTIGLOB: CPT

## 2025-07-23 PROCEDURE — 87040 BLOOD CULTURE FOR BACTERIA: CPT

## 2025-07-23 PROCEDURE — 80048 BASIC METABOLIC PNL TOTAL CA: CPT

## 2025-07-23 PROCEDURE — 92610 EVALUATE SWALLOWING FUNCTION: CPT

## 2025-07-23 PROCEDURE — 84484 ASSAY OF TROPONIN QUANT: CPT

## 2025-07-23 PROCEDURE — 82962 GLUCOSE BLOOD TEST: CPT

## 2025-07-23 PROCEDURE — 82803 BLOOD GASES ANY COMBINATION: CPT

## 2025-07-23 PROCEDURE — 80053 COMPREHEN METABOLIC PANEL: CPT

## 2025-07-23 PROCEDURE — 86905 BLOOD TYPING RBC ANTIGENS: CPT

## 2025-07-23 PROCEDURE — 83735 ASSAY OF MAGNESIUM: CPT

## 2025-07-23 PROCEDURE — 93923 UPR/LXTR ART STDY 3+ LVLS: CPT | Mod: 26

## 2025-07-23 PROCEDURE — 99232 SBSQ HOSP IP/OBS MODERATE 35: CPT

## 2025-07-23 PROCEDURE — C8929: CPT

## 2025-07-23 PROCEDURE — 85730 THROMBOPLASTIN TIME PARTIAL: CPT

## 2025-07-23 PROCEDURE — 72125 CT NECK SPINE W/O DYE: CPT

## 2025-07-23 PROCEDURE — 86880 COOMBS TEST DIRECT: CPT

## 2025-07-23 RX ORDER — FUROSEMIDE 10 MG/ML
40 INJECTION INTRAMUSCULAR; INTRAVENOUS DAILY
Refills: 0 | Status: DISCONTINUED | OUTPATIENT
Start: 2025-07-24 | End: 2025-07-25

## 2025-07-23 RX ORDER — OXYCODONE HYDROCHLORIDE 30 MG/1
2.5 TABLET ORAL
Refills: 0 | Status: DISCONTINUED | OUTPATIENT
Start: 2025-07-23 | End: 2025-07-30

## 2025-07-23 RX ADMIN — GABAPENTIN 100 MILLIGRAM(S): 400 CAPSULE ORAL at 05:42

## 2025-07-23 RX ADMIN — Medication 650 MILLIGRAM(S): at 06:12

## 2025-07-23 RX ADMIN — Medication 650 MILLIGRAM(S): at 05:42

## 2025-07-23 RX ADMIN — Medication 0.2 MILLIGRAM(S): at 03:34

## 2025-07-23 RX ADMIN — Medication 1300 MILLIGRAM(S): at 05:42

## 2025-07-23 RX ADMIN — Medication 1 APPLICATION(S): at 08:48

## 2025-07-23 RX ADMIN — INSULIN LISPRO 1: 100 INJECTION, SOLUTION INTRAVENOUS; SUBCUTANEOUS at 12:45

## 2025-07-23 RX ADMIN — OXYCODONE HYDROCHLORIDE 2.5 MILLIGRAM(S): 30 TABLET ORAL at 23:13

## 2025-07-23 RX ADMIN — GABAPENTIN 100 MILLIGRAM(S): 400 CAPSULE ORAL at 21:10

## 2025-07-23 RX ADMIN — Medication 0.2 MILLIGRAM(S): at 03:04

## 2025-07-23 RX ADMIN — CLOPIDOGREL BISULFATE 75 MILLIGRAM(S): 75 TABLET, FILM COATED ORAL at 12:46

## 2025-07-23 RX ADMIN — Medication 1 TABLET(S): at 12:46

## 2025-07-23 RX ADMIN — GABAPENTIN 100 MILLIGRAM(S): 400 CAPSULE ORAL at 13:06

## 2025-07-23 RX ADMIN — Medication 20 MILLIGRAM(S): at 12:46

## 2025-07-23 RX ADMIN — SACUBITRIL AND VALSARTAN 1 TABLET(S): 6; 6 PELLET ORAL at 05:43

## 2025-07-23 RX ADMIN — INSULIN LISPRO 3: 100 INJECTION, SOLUTION INTRAVENOUS; SUBCUTANEOUS at 17:19

## 2025-07-23 RX ADMIN — ATORVASTATIN CALCIUM 10 MILLIGRAM(S): 80 TABLET, FILM COATED ORAL at 21:10

## 2025-07-23 RX ADMIN — SACUBITRIL AND VALSARTAN 1 TABLET(S): 6; 6 PELLET ORAL at 17:19

## 2025-07-23 RX ADMIN — APIXABAN 5 MILLIGRAM(S): 5 TABLET, FILM COATED ORAL at 05:41

## 2025-07-23 RX ADMIN — METOPROLOL SUCCINATE 100 MILLIGRAM(S): 50 TABLET, EXTENDED RELEASE ORAL at 05:42

## 2025-07-23 RX ADMIN — AMLODIPINE BESYLATE 10 MILLIGRAM(S): 10 TABLET ORAL at 05:42

## 2025-07-23 RX ADMIN — APIXABAN 5 MILLIGRAM(S): 5 TABLET, FILM COATED ORAL at 17:19

## 2025-07-23 RX ADMIN — FUROSEMIDE 40 MILLIGRAM(S): 10 INJECTION INTRAMUSCULAR; INTRAVENOUS at 05:43

## 2025-07-23 RX ADMIN — INSULIN GLARGINE-YFGN 10 UNIT(S): 100 INJECTION, SOLUTION SUBCUTANEOUS at 21:11

## 2025-07-23 RX ADMIN — Medication 1300 MILLIGRAM(S): at 17:19

## 2025-07-23 RX ADMIN — NYSTATIN 1 APPLICATION(S): 100000 CREAM TOPICAL at 21:11

## 2025-07-23 RX ADMIN — NYSTATIN 1 APPLICATION(S): 100000 CREAM TOPICAL at 13:06

## 2025-07-23 RX ADMIN — NYSTATIN 1 APPLICATION(S): 100000 CREAM TOPICAL at 05:43

## 2025-07-23 RX ADMIN — INSULIN LISPRO 1: 100 INJECTION, SOLUTION INTRAVENOUS; SUBCUTANEOUS at 08:49

## 2025-07-24 LAB
CULTURE RESULTS: SIGNIFICANT CHANGE UP
CULTURE RESULTS: SIGNIFICANT CHANGE UP
GLUCOSE BLDC GLUCOMTR-MCNC: 156 MG/DL — HIGH (ref 70–99)
GLUCOSE BLDC GLUCOMTR-MCNC: 212 MG/DL — HIGH (ref 70–99)
GLUCOSE BLDC GLUCOMTR-MCNC: 228 MG/DL — HIGH (ref 70–99)
GLUCOSE BLDC GLUCOMTR-MCNC: 236 MG/DL — HIGH (ref 70–99)
SPECIMEN SOURCE: SIGNIFICANT CHANGE UP
SPECIMEN SOURCE: SIGNIFICANT CHANGE UP

## 2025-07-24 PROCEDURE — 99232 SBSQ HOSP IP/OBS MODERATE 35: CPT

## 2025-07-24 RX ADMIN — INSULIN LISPRO 2: 100 INJECTION, SOLUTION INTRAVENOUS; SUBCUTANEOUS at 17:34

## 2025-07-24 RX ADMIN — CLOPIDOGREL BISULFATE 75 MILLIGRAM(S): 75 TABLET, FILM COATED ORAL at 13:14

## 2025-07-24 RX ADMIN — Medication 1 TABLET(S): at 11:32

## 2025-07-24 RX ADMIN — Medication 1300 MILLIGRAM(S): at 05:48

## 2025-07-24 RX ADMIN — METOPROLOL SUCCINATE 100 MILLIGRAM(S): 50 TABLET, EXTENDED RELEASE ORAL at 05:47

## 2025-07-24 RX ADMIN — INSULIN LISPRO 1: 100 INJECTION, SOLUTION INTRAVENOUS; SUBCUTANEOUS at 09:31

## 2025-07-24 RX ADMIN — OXYCODONE HYDROCHLORIDE 2.5 MILLIGRAM(S): 30 TABLET ORAL at 09:31

## 2025-07-24 RX ADMIN — Medication 1 APPLICATION(S): at 09:30

## 2025-07-24 RX ADMIN — NYSTATIN 1 APPLICATION(S): 100000 CREAM TOPICAL at 13:37

## 2025-07-24 RX ADMIN — GABAPENTIN 100 MILLIGRAM(S): 400 CAPSULE ORAL at 21:48

## 2025-07-24 RX ADMIN — APIXABAN 5 MILLIGRAM(S): 5 TABLET, FILM COATED ORAL at 17:02

## 2025-07-24 RX ADMIN — ATORVASTATIN CALCIUM 10 MILLIGRAM(S): 80 TABLET, FILM COATED ORAL at 21:50

## 2025-07-24 RX ADMIN — NYSTATIN 1 APPLICATION(S): 100000 CREAM TOPICAL at 22:00

## 2025-07-24 RX ADMIN — OXYCODONE HYDROCHLORIDE 2.5 MILLIGRAM(S): 30 TABLET ORAL at 08:27

## 2025-07-24 RX ADMIN — GABAPENTIN 100 MILLIGRAM(S): 400 CAPSULE ORAL at 14:37

## 2025-07-24 RX ADMIN — Medication 1300 MILLIGRAM(S): at 17:03

## 2025-07-24 RX ADMIN — OXYCODONE HYDROCHLORIDE 2.5 MILLIGRAM(S): 30 TABLET ORAL at 21:47

## 2025-07-24 RX ADMIN — INSULIN GLARGINE-YFGN 10 UNIT(S): 100 INJECTION, SOLUTION SUBCUTANEOUS at 21:48

## 2025-07-24 RX ADMIN — NYSTATIN 1 APPLICATION(S): 100000 CREAM TOPICAL at 05:49

## 2025-07-24 RX ADMIN — APIXABAN 5 MILLIGRAM(S): 5 TABLET, FILM COATED ORAL at 05:48

## 2025-07-24 RX ADMIN — AMLODIPINE BESYLATE 10 MILLIGRAM(S): 10 TABLET ORAL at 05:47

## 2025-07-24 RX ADMIN — SACUBITRIL AND VALSARTAN 1 TABLET(S): 6; 6 PELLET ORAL at 17:02

## 2025-07-24 RX ADMIN — Medication 20 MILLIGRAM(S): at 11:31

## 2025-07-24 RX ADMIN — INSULIN LISPRO 2: 100 INJECTION, SOLUTION INTRAVENOUS; SUBCUTANEOUS at 12:17

## 2025-07-24 RX ADMIN — GABAPENTIN 100 MILLIGRAM(S): 400 CAPSULE ORAL at 05:49

## 2025-07-24 RX ADMIN — SACUBITRIL AND VALSARTAN 1 TABLET(S): 6; 6 PELLET ORAL at 05:47

## 2025-07-24 RX ADMIN — FUROSEMIDE 40 MILLIGRAM(S): 10 INJECTION INTRAMUSCULAR; INTRAVENOUS at 05:47

## 2025-07-24 RX ADMIN — OXYCODONE HYDROCHLORIDE 2.5 MILLIGRAM(S): 30 TABLET ORAL at 00:15

## 2025-07-24 RX ADMIN — OXYCODONE HYDROCHLORIDE 2.5 MILLIGRAM(S): 30 TABLET ORAL at 22:47

## 2025-07-25 LAB
ANION GAP SERPL CALC-SCNC: 11 MMOL/L — SIGNIFICANT CHANGE UP (ref 5–17)
BUN SERPL-MCNC: 39 MG/DL — HIGH (ref 7–23)
CALCIUM SERPL-MCNC: 8.4 MG/DL — SIGNIFICANT CHANGE UP (ref 8.4–10.5)
CHLORIDE SERPL-SCNC: 103 MMOL/L — SIGNIFICANT CHANGE UP (ref 96–108)
CO2 SERPL-SCNC: 26 MMOL/L — SIGNIFICANT CHANGE UP (ref 22–31)
CREAT SERPL-MCNC: 1.68 MG/DL — HIGH (ref 0.5–1.3)
EGFR: 31 ML/MIN/1.73M2 — LOW
EGFR: 31 ML/MIN/1.73M2 — LOW
GLUCOSE BLDC GLUCOMTR-MCNC: 147 MG/DL — HIGH (ref 70–99)
GLUCOSE BLDC GLUCOMTR-MCNC: 182 MG/DL — HIGH (ref 70–99)
GLUCOSE BLDC GLUCOMTR-MCNC: 185 MG/DL — HIGH (ref 70–99)
GLUCOSE BLDC GLUCOMTR-MCNC: 209 MG/DL — HIGH (ref 70–99)
GLUCOSE SERPL-MCNC: 140 MG/DL — HIGH (ref 70–99)
POTASSIUM SERPL-MCNC: 4.7 MMOL/L — SIGNIFICANT CHANGE UP (ref 3.5–5.3)
POTASSIUM SERPL-SCNC: 4.7 MMOL/L — SIGNIFICANT CHANGE UP (ref 3.5–5.3)
SODIUM SERPL-SCNC: 140 MMOL/L — SIGNIFICANT CHANGE UP (ref 135–145)

## 2025-07-25 PROCEDURE — 71045 X-RAY EXAM CHEST 1 VIEW: CPT | Mod: 26

## 2025-07-25 RX ORDER — INSULIN LISPRO 100 U/ML
INJECTION, SOLUTION INTRAVENOUS; SUBCUTANEOUS EVERY 6 HOURS
Refills: 0 | Status: DISCONTINUED | OUTPATIENT
Start: 2025-07-25 | End: 2025-07-26

## 2025-07-25 RX ORDER — FUROSEMIDE 10 MG/ML
40 INJECTION INTRAMUSCULAR; INTRAVENOUS DAILY
Refills: 0 | Status: DISCONTINUED | OUTPATIENT
Start: 2025-07-26 | End: 2025-08-01

## 2025-07-25 RX ADMIN — Medication 1300 MILLIGRAM(S): at 17:48

## 2025-07-25 RX ADMIN — APIXABAN 5 MILLIGRAM(S): 5 TABLET, FILM COATED ORAL at 17:48

## 2025-07-25 RX ADMIN — SACUBITRIL AND VALSARTAN 1 TABLET(S): 6; 6 PELLET ORAL at 05:47

## 2025-07-25 RX ADMIN — NYSTATIN 1 APPLICATION(S): 100000 CREAM TOPICAL at 05:44

## 2025-07-25 RX ADMIN — METOPROLOL SUCCINATE 100 MILLIGRAM(S): 50 TABLET, EXTENDED RELEASE ORAL at 05:47

## 2025-07-25 RX ADMIN — Medication 1 APPLICATION(S): at 13:31

## 2025-07-25 RX ADMIN — INSULIN LISPRO 2: 100 INJECTION, SOLUTION INTRAVENOUS; SUBCUTANEOUS at 17:49

## 2025-07-25 RX ADMIN — AMLODIPINE BESYLATE 10 MILLIGRAM(S): 10 TABLET ORAL at 05:46

## 2025-07-25 RX ADMIN — INSULIN LISPRO 1: 100 INJECTION, SOLUTION INTRAVENOUS; SUBCUTANEOUS at 09:18

## 2025-07-25 RX ADMIN — GABAPENTIN 100 MILLIGRAM(S): 400 CAPSULE ORAL at 21:30

## 2025-07-25 RX ADMIN — OXYCODONE HYDROCHLORIDE 2.5 MILLIGRAM(S): 30 TABLET ORAL at 04:05

## 2025-07-25 RX ADMIN — Medication 1 TABLET(S): at 13:32

## 2025-07-25 RX ADMIN — CLOPIDOGREL BISULFATE 75 MILLIGRAM(S): 75 TABLET, FILM COATED ORAL at 13:32

## 2025-07-25 RX ADMIN — GABAPENTIN 100 MILLIGRAM(S): 400 CAPSULE ORAL at 13:31

## 2025-07-25 RX ADMIN — Medication 20 MILLIGRAM(S): at 13:31

## 2025-07-25 RX ADMIN — OXYCODONE HYDROCHLORIDE 2.5 MILLIGRAM(S): 30 TABLET ORAL at 05:00

## 2025-07-25 RX ADMIN — APIXABAN 5 MILLIGRAM(S): 5 TABLET, FILM COATED ORAL at 05:45

## 2025-07-25 RX ADMIN — GABAPENTIN 100 MILLIGRAM(S): 400 CAPSULE ORAL at 05:46

## 2025-07-25 RX ADMIN — FUROSEMIDE 40 MILLIGRAM(S): 10 INJECTION INTRAMUSCULAR; INTRAVENOUS at 05:44

## 2025-07-25 RX ADMIN — OXYCODONE HYDROCHLORIDE 2.5 MILLIGRAM(S): 30 TABLET ORAL at 21:29

## 2025-07-25 RX ADMIN — SACUBITRIL AND VALSARTAN 1 TABLET(S): 6; 6 PELLET ORAL at 17:48

## 2025-07-25 RX ADMIN — NYSTATIN 1 APPLICATION(S): 100000 CREAM TOPICAL at 13:31

## 2025-07-25 RX ADMIN — Medication 1300 MILLIGRAM(S): at 05:46

## 2025-07-25 RX ADMIN — ATORVASTATIN CALCIUM 10 MILLIGRAM(S): 80 TABLET, FILM COATED ORAL at 21:25

## 2025-07-25 RX ADMIN — Medication 650 MILLIGRAM(S): at 13:36

## 2025-07-26 LAB
ANION GAP SERPL CALC-SCNC: 8 MMOL/L — SIGNIFICANT CHANGE UP (ref 5–17)
BUN SERPL-MCNC: 41 MG/DL — HIGH (ref 7–23)
CALCIUM SERPL-MCNC: 8.5 MG/DL — SIGNIFICANT CHANGE UP (ref 8.4–10.5)
CHLORIDE SERPL-SCNC: 105 MMOL/L — SIGNIFICANT CHANGE UP (ref 96–108)
CO2 SERPL-SCNC: 29 MMOL/L — SIGNIFICANT CHANGE UP (ref 22–31)
CREAT SERPL-MCNC: 1.65 MG/DL — HIGH (ref 0.5–1.3)
EGFR: 32 ML/MIN/1.73M2 — LOW
EGFR: 32 ML/MIN/1.73M2 — LOW
GLUCOSE BLDC GLUCOMTR-MCNC: 135 MG/DL — HIGH (ref 70–99)
GLUCOSE BLDC GLUCOMTR-MCNC: 196 MG/DL — HIGH (ref 70–99)
GLUCOSE BLDC GLUCOMTR-MCNC: 247 MG/DL — HIGH (ref 70–99)
GLUCOSE BLDC GLUCOMTR-MCNC: 258 MG/DL — HIGH (ref 70–99)
GLUCOSE SERPL-MCNC: 133 MG/DL — HIGH (ref 70–99)
HCT VFR BLD CALC: 28.5 % — LOW (ref 34.5–45)
HGB BLD-MCNC: 8.2 G/DL — LOW (ref 11.5–15.5)
MAGNESIUM SERPL-MCNC: 1.9 MG/DL — SIGNIFICANT CHANGE UP (ref 1.6–2.6)
MCHC RBC-ENTMCNC: 27.9 PG — SIGNIFICANT CHANGE UP (ref 27–34)
MCHC RBC-ENTMCNC: 28.8 G/DL — LOW (ref 32–36)
MCV RBC AUTO: 96.9 FL — SIGNIFICANT CHANGE UP (ref 80–100)
NRBC # BLD AUTO: 0 K/UL — SIGNIFICANT CHANGE UP (ref 0–0)
NRBC # FLD: 0 K/UL — SIGNIFICANT CHANGE UP (ref 0–0)
NRBC BLD AUTO-RTO: 0 /100 WBCS — SIGNIFICANT CHANGE UP (ref 0–0)
PLATELET # BLD AUTO: 237 K/UL — SIGNIFICANT CHANGE UP (ref 150–400)
PMV BLD: 10 FL — SIGNIFICANT CHANGE UP (ref 7–13)
POTASSIUM SERPL-MCNC: 5.2 MMOL/L — SIGNIFICANT CHANGE UP (ref 3.5–5.3)
POTASSIUM SERPL-SCNC: 5.2 MMOL/L — SIGNIFICANT CHANGE UP (ref 3.5–5.3)
RBC # BLD: 2.94 M/UL — LOW (ref 3.8–5.2)
RBC # FLD: 18.1 % — HIGH (ref 10.3–14.5)
SODIUM SERPL-SCNC: 142 MMOL/L — SIGNIFICANT CHANGE UP (ref 135–145)
WBC # BLD: 5.87 K/UL — SIGNIFICANT CHANGE UP (ref 3.8–10.5)
WBC # FLD AUTO: 5.87 K/UL — SIGNIFICANT CHANGE UP (ref 3.8–10.5)

## 2025-07-26 RX ORDER — INSULIN LISPRO 100 U/ML
INJECTION, SOLUTION INTRAVENOUS; SUBCUTANEOUS AT BEDTIME
Refills: 0 | Status: DISCONTINUED | OUTPATIENT
Start: 2025-07-26 | End: 2025-08-01

## 2025-07-26 RX ORDER — INSULIN LISPRO 100 U/ML
INJECTION, SOLUTION INTRAVENOUS; SUBCUTANEOUS
Refills: 0 | Status: DISCONTINUED | OUTPATIENT
Start: 2025-07-26 | End: 2025-08-01

## 2025-07-26 RX ADMIN — Medication 1 APPLICATION(S): at 09:40

## 2025-07-26 RX ADMIN — OXYCODONE HYDROCHLORIDE 2.5 MILLIGRAM(S): 30 TABLET ORAL at 09:36

## 2025-07-26 RX ADMIN — APIXABAN 5 MILLIGRAM(S): 5 TABLET, FILM COATED ORAL at 05:38

## 2025-07-26 RX ADMIN — SACUBITRIL AND VALSARTAN 1 TABLET(S): 6; 6 PELLET ORAL at 05:38

## 2025-07-26 RX ADMIN — Medication 1 TABLET(S): at 12:02

## 2025-07-26 RX ADMIN — OXYCODONE HYDROCHLORIDE 2.5 MILLIGRAM(S): 30 TABLET ORAL at 09:40

## 2025-07-26 RX ADMIN — GABAPENTIN 100 MILLIGRAM(S): 400 CAPSULE ORAL at 13:00

## 2025-07-26 RX ADMIN — ATORVASTATIN CALCIUM 10 MILLIGRAM(S): 80 TABLET, FILM COATED ORAL at 21:29

## 2025-07-26 RX ADMIN — CLOPIDOGREL BISULFATE 75 MILLIGRAM(S): 75 TABLET, FILM COATED ORAL at 12:02

## 2025-07-26 RX ADMIN — SACUBITRIL AND VALSARTAN 1 TABLET(S): 6; 6 PELLET ORAL at 17:26

## 2025-07-26 RX ADMIN — GABAPENTIN 100 MILLIGRAM(S): 400 CAPSULE ORAL at 21:37

## 2025-07-26 RX ADMIN — INSULIN LISPRO 3: 100 INJECTION, SOLUTION INTRAVENOUS; SUBCUTANEOUS at 12:57

## 2025-07-26 RX ADMIN — INSULIN GLARGINE-YFGN 10 UNIT(S): 100 INJECTION, SOLUTION SUBCUTANEOUS at 21:59

## 2025-07-26 RX ADMIN — AMLODIPINE BESYLATE 10 MILLIGRAM(S): 10 TABLET ORAL at 05:38

## 2025-07-26 RX ADMIN — OXYCODONE HYDROCHLORIDE 2.5 MILLIGRAM(S): 30 TABLET ORAL at 21:36

## 2025-07-26 RX ADMIN — Medication 1300 MILLIGRAM(S): at 17:27

## 2025-07-26 RX ADMIN — Medication 1300 MILLIGRAM(S): at 05:39

## 2025-07-26 RX ADMIN — GABAPENTIN 100 MILLIGRAM(S): 400 CAPSULE ORAL at 05:43

## 2025-07-26 RX ADMIN — Medication 20 MILLIGRAM(S): at 12:02

## 2025-07-26 RX ADMIN — INSULIN GLARGINE-YFGN 10 UNIT(S): 100 INJECTION, SOLUTION SUBCUTANEOUS at 00:02

## 2025-07-26 RX ADMIN — OXYCODONE HYDROCHLORIDE 2.5 MILLIGRAM(S): 30 TABLET ORAL at 22:10

## 2025-07-26 RX ADMIN — APIXABAN 5 MILLIGRAM(S): 5 TABLET, FILM COATED ORAL at 17:26

## 2025-07-26 RX ADMIN — INSULIN LISPRO 1: 100 INJECTION, SOLUTION INTRAVENOUS; SUBCUTANEOUS at 17:30

## 2025-07-26 RX ADMIN — OXYCODONE HYDROCHLORIDE 2.5 MILLIGRAM(S): 30 TABLET ORAL at 09:44

## 2025-07-26 RX ADMIN — FUROSEMIDE 40 MILLIGRAM(S): 10 INJECTION INTRAMUSCULAR; INTRAVENOUS at 05:43

## 2025-07-26 RX ADMIN — METOPROLOL SUCCINATE 100 MILLIGRAM(S): 50 TABLET, EXTENDED RELEASE ORAL at 05:38

## 2025-07-27 LAB
GLUCOSE BLDC GLUCOMTR-MCNC: 138 MG/DL — HIGH (ref 70–99)
GLUCOSE BLDC GLUCOMTR-MCNC: 166 MG/DL — HIGH (ref 70–99)
GLUCOSE BLDC GLUCOMTR-MCNC: 175 MG/DL — HIGH (ref 70–99)
GLUCOSE BLDC GLUCOMTR-MCNC: 255 MG/DL — HIGH (ref 70–99)

## 2025-07-27 PROCEDURE — 71045 X-RAY EXAM CHEST 1 VIEW: CPT | Mod: 26

## 2025-07-27 RX ADMIN — Medication 1300 MILLIGRAM(S): at 06:20

## 2025-07-27 RX ADMIN — INSULIN LISPRO 3: 100 INJECTION, SOLUTION INTRAVENOUS; SUBCUTANEOUS at 17:22

## 2025-07-27 RX ADMIN — Medication 1 TABLET(S): at 12:18

## 2025-07-27 RX ADMIN — METOPROLOL SUCCINATE 100 MILLIGRAM(S): 50 TABLET, EXTENDED RELEASE ORAL at 06:20

## 2025-07-27 RX ADMIN — INSULIN GLARGINE-YFGN 10 UNIT(S): 100 INJECTION, SOLUTION SUBCUTANEOUS at 21:52

## 2025-07-27 RX ADMIN — FUROSEMIDE 40 MILLIGRAM(S): 10 INJECTION INTRAMUSCULAR; INTRAVENOUS at 06:19

## 2025-07-27 RX ADMIN — AMLODIPINE BESYLATE 10 MILLIGRAM(S): 10 TABLET ORAL at 06:20

## 2025-07-27 RX ADMIN — SACUBITRIL AND VALSARTAN 1 TABLET(S): 6; 6 PELLET ORAL at 17:20

## 2025-07-27 RX ADMIN — INSULIN LISPRO 1: 100 INJECTION, SOLUTION INTRAVENOUS; SUBCUTANEOUS at 13:01

## 2025-07-27 RX ADMIN — SACUBITRIL AND VALSARTAN 1 TABLET(S): 6; 6 PELLET ORAL at 06:19

## 2025-07-27 RX ADMIN — Medication 1300 MILLIGRAM(S): at 17:20

## 2025-07-27 RX ADMIN — ATORVASTATIN CALCIUM 10 MILLIGRAM(S): 80 TABLET, FILM COATED ORAL at 21:27

## 2025-07-27 RX ADMIN — GABAPENTIN 100 MILLIGRAM(S): 400 CAPSULE ORAL at 13:28

## 2025-07-27 RX ADMIN — OXYCODONE HYDROCHLORIDE 2.5 MILLIGRAM(S): 30 TABLET ORAL at 13:23

## 2025-07-27 RX ADMIN — APIXABAN 5 MILLIGRAM(S): 5 TABLET, FILM COATED ORAL at 17:20

## 2025-07-27 RX ADMIN — GABAPENTIN 100 MILLIGRAM(S): 400 CAPSULE ORAL at 21:27

## 2025-07-27 RX ADMIN — CLOPIDOGREL BISULFATE 75 MILLIGRAM(S): 75 TABLET, FILM COATED ORAL at 12:18

## 2025-07-27 RX ADMIN — Medication 1 APPLICATION(S): at 08:57

## 2025-07-27 RX ADMIN — OXYCODONE HYDROCHLORIDE 2.5 MILLIGRAM(S): 30 TABLET ORAL at 12:17

## 2025-07-27 RX ADMIN — OXYCODONE HYDROCHLORIDE 2.5 MILLIGRAM(S): 30 TABLET ORAL at 23:24

## 2025-07-27 RX ADMIN — INSULIN LISPRO 1: 100 INJECTION, SOLUTION INTRAVENOUS; SUBCUTANEOUS at 08:56

## 2025-07-27 RX ADMIN — APIXABAN 5 MILLIGRAM(S): 5 TABLET, FILM COATED ORAL at 06:19

## 2025-07-27 RX ADMIN — Medication 20 MILLIGRAM(S): at 12:18

## 2025-07-27 RX ADMIN — GABAPENTIN 100 MILLIGRAM(S): 400 CAPSULE ORAL at 06:32

## 2025-07-28 LAB
ANION GAP SERPL CALC-SCNC: 13 MMOL/L — SIGNIFICANT CHANGE UP (ref 5–17)
BUN SERPL-MCNC: 46 MG/DL — HIGH (ref 7–23)
CALCIUM SERPL-MCNC: 8.3 MG/DL — LOW (ref 8.4–10.5)
CHLORIDE SERPL-SCNC: 101 MMOL/L — SIGNIFICANT CHANGE UP (ref 96–108)
CO2 SERPL-SCNC: 26 MMOL/L — SIGNIFICANT CHANGE UP (ref 22–31)
CREAT SERPL-MCNC: 1.71 MG/DL — HIGH (ref 0.5–1.3)
EGFR: 31 ML/MIN/1.73M2 — LOW
EGFR: 31 ML/MIN/1.73M2 — LOW
GLUCOSE BLDC GLUCOMTR-MCNC: 132 MG/DL — HIGH (ref 70–99)
GLUCOSE BLDC GLUCOMTR-MCNC: 213 MG/DL — HIGH (ref 70–99)
GLUCOSE BLDC GLUCOMTR-MCNC: 223 MG/DL — HIGH (ref 70–99)
GLUCOSE SERPL-MCNC: 125 MG/DL — HIGH (ref 70–99)
MAGNESIUM SERPL-MCNC: 1.9 MG/DL — SIGNIFICANT CHANGE UP (ref 1.6–2.6)
PHOSPHATE SERPL-MCNC: 4 MG/DL — SIGNIFICANT CHANGE UP (ref 2.5–4.5)
POTASSIUM SERPL-MCNC: 5 MMOL/L — SIGNIFICANT CHANGE UP (ref 3.5–5.3)
POTASSIUM SERPL-SCNC: 5 MMOL/L — SIGNIFICANT CHANGE UP (ref 3.5–5.3)
SODIUM SERPL-SCNC: 140 MMOL/L — SIGNIFICANT CHANGE UP (ref 135–145)

## 2025-07-28 RX ORDER — WHITE PETROLATUM 1 G/G
1 OINTMENT TOPICAL
Refills: 0 | Status: DISCONTINUED | OUTPATIENT
Start: 2025-07-28 | End: 2025-08-01

## 2025-07-28 RX ORDER — FUROSEMIDE 10 MG/ML
40 INJECTION INTRAMUSCULAR; INTRAVENOUS ONCE
Refills: 0 | Status: COMPLETED | OUTPATIENT
Start: 2025-07-28 | End: 2025-07-28

## 2025-07-28 RX ORDER — NYSTATIN 100000 [USP'U]/G
1 CREAM TOPICAL
Refills: 0 | Status: DISCONTINUED | OUTPATIENT
Start: 2025-07-28 | End: 2025-08-01

## 2025-07-28 RX ORDER — MELATONIN 5 MG
3 TABLET ORAL ONCE
Refills: 0 | Status: COMPLETED | OUTPATIENT
Start: 2025-07-28 | End: 2025-07-28

## 2025-07-28 RX ORDER — MAGNESIUM SULFATE 500 MG/ML
1 SYRINGE (ML) INJECTION ONCE
Refills: 0 | Status: COMPLETED | OUTPATIENT
Start: 2025-07-28 | End: 2025-07-28

## 2025-07-28 RX ADMIN — SACUBITRIL AND VALSARTAN 1 TABLET(S): 6; 6 PELLET ORAL at 05:13

## 2025-07-28 RX ADMIN — CLOPIDOGREL BISULFATE 75 MILLIGRAM(S): 75 TABLET, FILM COATED ORAL at 12:47

## 2025-07-28 RX ADMIN — APIXABAN 5 MILLIGRAM(S): 5 TABLET, FILM COATED ORAL at 18:06

## 2025-07-28 RX ADMIN — GABAPENTIN 100 MILLIGRAM(S): 400 CAPSULE ORAL at 05:13

## 2025-07-28 RX ADMIN — Medication 100 GRAM(S): at 10:26

## 2025-07-28 RX ADMIN — Medication 1 TABLET(S): at 12:47

## 2025-07-28 RX ADMIN — Medication 1300 MILLIGRAM(S): at 05:14

## 2025-07-28 RX ADMIN — ATORVASTATIN CALCIUM 10 MILLIGRAM(S): 80 TABLET, FILM COATED ORAL at 21:13

## 2025-07-28 RX ADMIN — METOPROLOL SUCCINATE 100 MILLIGRAM(S): 50 TABLET, EXTENDED RELEASE ORAL at 05:14

## 2025-07-28 RX ADMIN — APIXABAN 5 MILLIGRAM(S): 5 TABLET, FILM COATED ORAL at 05:13

## 2025-07-28 RX ADMIN — WHITE PETROLATUM 1 APPLICATION(S): 1 OINTMENT TOPICAL at 18:07

## 2025-07-28 RX ADMIN — SACUBITRIL AND VALSARTAN 1 TABLET(S): 6; 6 PELLET ORAL at 18:06

## 2025-07-28 RX ADMIN — GABAPENTIN 100 MILLIGRAM(S): 400 CAPSULE ORAL at 21:13

## 2025-07-28 RX ADMIN — Medication 20 MILLIGRAM(S): at 12:47

## 2025-07-28 RX ADMIN — Medication 1 APPLICATION(S): at 10:25

## 2025-07-28 RX ADMIN — FUROSEMIDE 40 MILLIGRAM(S): 10 INJECTION INTRAMUSCULAR; INTRAVENOUS at 16:12

## 2025-07-28 RX ADMIN — INSULIN GLARGINE-YFGN 10 UNIT(S): 100 INJECTION, SOLUTION SUBCUTANEOUS at 21:13

## 2025-07-28 RX ADMIN — NYSTATIN 1 APPLICATION(S): 100000 CREAM TOPICAL at 18:10

## 2025-07-28 RX ADMIN — AMLODIPINE BESYLATE 10 MILLIGRAM(S): 10 TABLET ORAL at 05:13

## 2025-07-28 RX ADMIN — Medication 1300 MILLIGRAM(S): at 18:07

## 2025-07-28 RX ADMIN — INSULIN LISPRO 2: 100 INJECTION, SOLUTION INTRAVENOUS; SUBCUTANEOUS at 18:06

## 2025-07-28 RX ADMIN — FUROSEMIDE 40 MILLIGRAM(S): 10 INJECTION INTRAMUSCULAR; INTRAVENOUS at 05:14

## 2025-07-28 RX ADMIN — Medication 3 MILLIGRAM(S): at 21:13

## 2025-07-29 LAB
ANION GAP SERPL CALC-SCNC: 12 MMOL/L — SIGNIFICANT CHANGE UP (ref 5–17)
BUN SERPL-MCNC: 47 MG/DL — HIGH (ref 7–23)
CALCIUM SERPL-MCNC: 8.6 MG/DL — SIGNIFICANT CHANGE UP (ref 8.4–10.5)
CHLORIDE SERPL-SCNC: 101 MMOL/L — SIGNIFICANT CHANGE UP (ref 96–108)
CO2 SERPL-SCNC: 26 MMOL/L — SIGNIFICANT CHANGE UP (ref 22–31)
CREAT SERPL-MCNC: 1.88 MG/DL — HIGH (ref 0.5–1.3)
EGFR: 27 ML/MIN/1.73M2 — LOW
EGFR: 27 ML/MIN/1.73M2 — LOW
GLUCOSE BLDC GLUCOMTR-MCNC: 154 MG/DL — HIGH (ref 70–99)
GLUCOSE BLDC GLUCOMTR-MCNC: 183 MG/DL — HIGH (ref 70–99)
GLUCOSE BLDC GLUCOMTR-MCNC: 215 MG/DL — HIGH (ref 70–99)
GLUCOSE BLDC GLUCOMTR-MCNC: 216 MG/DL — HIGH (ref 70–99)
GLUCOSE SERPL-MCNC: 130 MG/DL — HIGH (ref 70–99)
MAGNESIUM SERPL-MCNC: 2 MG/DL — SIGNIFICANT CHANGE UP (ref 1.6–2.6)
POTASSIUM SERPL-MCNC: 4.9 MMOL/L — SIGNIFICANT CHANGE UP (ref 3.5–5.3)
POTASSIUM SERPL-SCNC: 4.9 MMOL/L — SIGNIFICANT CHANGE UP (ref 3.5–5.3)
SODIUM SERPL-SCNC: 139 MMOL/L — SIGNIFICANT CHANGE UP (ref 135–145)

## 2025-07-29 RX ORDER — FLUCONAZOLE 150 MG
150 TABLET ORAL ONCE
Refills: 0 | Status: COMPLETED | OUTPATIENT
Start: 2025-07-29 | End: 2025-07-29

## 2025-07-29 RX ADMIN — NYSTATIN 1 APPLICATION(S): 100000 CREAM TOPICAL at 18:27

## 2025-07-29 RX ADMIN — SACUBITRIL AND VALSARTAN 1 TABLET(S): 6; 6 PELLET ORAL at 18:30

## 2025-07-29 RX ADMIN — OXYCODONE HYDROCHLORIDE 2.5 MILLIGRAM(S): 30 TABLET ORAL at 21:48

## 2025-07-29 RX ADMIN — ATORVASTATIN CALCIUM 10 MILLIGRAM(S): 80 TABLET, FILM COATED ORAL at 21:07

## 2025-07-29 RX ADMIN — GABAPENTIN 100 MILLIGRAM(S): 400 CAPSULE ORAL at 13:21

## 2025-07-29 RX ADMIN — Medication 1 APPLICATION(S): at 10:29

## 2025-07-29 RX ADMIN — FUROSEMIDE 40 MILLIGRAM(S): 10 INJECTION INTRAMUSCULAR; INTRAVENOUS at 06:06

## 2025-07-29 RX ADMIN — APIXABAN 5 MILLIGRAM(S): 5 TABLET, FILM COATED ORAL at 18:25

## 2025-07-29 RX ADMIN — METOPROLOL SUCCINATE 100 MILLIGRAM(S): 50 TABLET, EXTENDED RELEASE ORAL at 06:05

## 2025-07-29 RX ADMIN — Medication 1300 MILLIGRAM(S): at 06:05

## 2025-07-29 RX ADMIN — INSULIN LISPRO 1: 100 INJECTION, SOLUTION INTRAVENOUS; SUBCUTANEOUS at 18:26

## 2025-07-29 RX ADMIN — GABAPENTIN 100 MILLIGRAM(S): 400 CAPSULE ORAL at 06:06

## 2025-07-29 RX ADMIN — WHITE PETROLATUM 1 APPLICATION(S): 1 OINTMENT TOPICAL at 18:30

## 2025-07-29 RX ADMIN — OXYCODONE HYDROCHLORIDE 2.5 MILLIGRAM(S): 30 TABLET ORAL at 00:26

## 2025-07-29 RX ADMIN — NYSTATIN 1 APPLICATION(S): 100000 CREAM TOPICAL at 07:08

## 2025-07-29 RX ADMIN — Medication 150 MILLIGRAM(S): at 18:25

## 2025-07-29 RX ADMIN — SACUBITRIL AND VALSARTAN 1 TABLET(S): 6; 6 PELLET ORAL at 06:05

## 2025-07-29 RX ADMIN — AMLODIPINE BESYLATE 10 MILLIGRAM(S): 10 TABLET ORAL at 06:06

## 2025-07-29 RX ADMIN — Medication 20 MILLIGRAM(S): at 13:14

## 2025-07-29 RX ADMIN — APIXABAN 5 MILLIGRAM(S): 5 TABLET, FILM COATED ORAL at 06:06

## 2025-07-29 RX ADMIN — OXYCODONE HYDROCHLORIDE 2.5 MILLIGRAM(S): 30 TABLET ORAL at 01:14

## 2025-07-29 RX ADMIN — Medication 1300 MILLIGRAM(S): at 18:26

## 2025-07-29 RX ADMIN — Medication 1 TABLET(S): at 13:14

## 2025-07-29 RX ADMIN — INSULIN GLARGINE-YFGN 10 UNIT(S): 100 INJECTION, SOLUTION SUBCUTANEOUS at 21:31

## 2025-07-29 RX ADMIN — INSULIN LISPRO 1: 100 INJECTION, SOLUTION INTRAVENOUS; SUBCUTANEOUS at 13:16

## 2025-07-29 RX ADMIN — OXYCODONE HYDROCHLORIDE 2.5 MILLIGRAM(S): 30 TABLET ORAL at 21:09

## 2025-07-29 RX ADMIN — CLOPIDOGREL BISULFATE 75 MILLIGRAM(S): 75 TABLET, FILM COATED ORAL at 13:15

## 2025-07-29 RX ADMIN — GABAPENTIN 100 MILLIGRAM(S): 400 CAPSULE ORAL at 21:07

## 2025-07-29 RX ADMIN — WHITE PETROLATUM 1 APPLICATION(S): 1 OINTMENT TOPICAL at 07:07

## 2025-07-30 LAB
ANION GAP SERPL CALC-SCNC: 13 MMOL/L — SIGNIFICANT CHANGE UP (ref 5–17)
BUN SERPL-MCNC: 49 MG/DL — HIGH (ref 7–23)
CALCIUM SERPL-MCNC: 8.6 MG/DL — SIGNIFICANT CHANGE UP (ref 8.4–10.5)
CHLORIDE SERPL-SCNC: 101 MMOL/L — SIGNIFICANT CHANGE UP (ref 96–108)
CO2 SERPL-SCNC: 26 MMOL/L — SIGNIFICANT CHANGE UP (ref 22–31)
CREAT SERPL-MCNC: 1.99 MG/DL — HIGH (ref 0.5–1.3)
EGFR: 26 ML/MIN/1.73M2 — LOW
EGFR: 26 ML/MIN/1.73M2 — LOW
GLUCOSE BLDC GLUCOMTR-MCNC: 155 MG/DL — HIGH (ref 70–99)
GLUCOSE BLDC GLUCOMTR-MCNC: 164 MG/DL — HIGH (ref 70–99)
GLUCOSE BLDC GLUCOMTR-MCNC: 197 MG/DL — HIGH (ref 70–99)
GLUCOSE BLDC GLUCOMTR-MCNC: 233 MG/DL — HIGH (ref 70–99)
GLUCOSE SERPL-MCNC: 144 MG/DL — HIGH (ref 70–99)
HCT VFR BLD CALC: 27.6 % — LOW (ref 34.5–45)
HGB BLD-MCNC: 8.1 G/DL — LOW (ref 11.5–15.5)
MCHC RBC-ENTMCNC: 27.9 PG — SIGNIFICANT CHANGE UP (ref 27–34)
MCHC RBC-ENTMCNC: 29.3 G/DL — LOW (ref 32–36)
MCV RBC AUTO: 95.2 FL — SIGNIFICANT CHANGE UP (ref 80–100)
NRBC # BLD AUTO: 0 K/UL — SIGNIFICANT CHANGE UP (ref 0–0)
NRBC # FLD: 0 K/UL — SIGNIFICANT CHANGE UP (ref 0–0)
NRBC BLD AUTO-RTO: 0 /100 WBCS — SIGNIFICANT CHANGE UP (ref 0–0)
PLATELET # BLD AUTO: 232 K/UL — SIGNIFICANT CHANGE UP (ref 150–400)
PMV BLD: 10.6 FL — SIGNIFICANT CHANGE UP (ref 7–13)
POTASSIUM SERPL-MCNC: 4.6 MMOL/L — SIGNIFICANT CHANGE UP (ref 3.5–5.3)
POTASSIUM SERPL-SCNC: 4.6 MMOL/L — SIGNIFICANT CHANGE UP (ref 3.5–5.3)
RBC # BLD: 2.9 M/UL — LOW (ref 3.8–5.2)
RBC # FLD: 18.2 % — HIGH (ref 10.3–14.5)
SODIUM SERPL-SCNC: 140 MMOL/L — SIGNIFICANT CHANGE UP (ref 135–145)
WBC # BLD: 6.23 K/UL — SIGNIFICANT CHANGE UP (ref 3.8–10.5)
WBC # FLD AUTO: 6.23 K/UL — SIGNIFICANT CHANGE UP (ref 3.8–10.5)

## 2025-07-30 PROCEDURE — 76700 US EXAM ABDOM COMPLETE: CPT | Mod: 26

## 2025-07-30 RX ORDER — TRAMADOL HYDROCHLORIDE 50 MG/1
1 TABLET, FILM COATED ORAL
Refills: 0 | DISCHARGE

## 2025-07-30 RX ORDER — POLYETHYLENE GLYCOL 3350 17 G/17G
17 POWDER, FOR SOLUTION ORAL
Qty: 0 | Refills: 0 | DISCHARGE
Start: 2025-07-30

## 2025-07-30 RX ORDER — FUROSEMIDE 10 MG/ML
1 INJECTION INTRAMUSCULAR; INTRAVENOUS
Refills: 0 | DISCHARGE

## 2025-07-30 RX ORDER — FUROSEMIDE 10 MG/ML
1 INJECTION INTRAMUSCULAR; INTRAVENOUS
Qty: 0 | Refills: 0 | DISCHARGE
Start: 2025-07-30

## 2025-07-30 RX ORDER — B1/B2/B3/B5/B6/B12/VIT C/FOLIC 500-0.5 MG
1 TABLET ORAL
Qty: 30 | Refills: 0
Start: 2025-07-30 | End: 2025-08-28

## 2025-07-30 RX ORDER — SACUBITRIL AND VALSARTAN 6; 6 MG/1; MG/1
1 PELLET ORAL
Qty: 0 | Refills: 0 | DISCHARGE
Start: 2025-07-30

## 2025-07-30 RX ORDER — CLOPIDOGREL BISULFATE 75 MG/1
1 TABLET, FILM COATED ORAL
Qty: 0 | Refills: 0 | DISCHARGE
Start: 2025-07-30

## 2025-07-30 RX ORDER — OXYCODONE HYDROCHLORIDE 30 MG/1
2.5 TABLET ORAL
Qty: 0 | Refills: 0 | DISCHARGE
Start: 2025-07-30

## 2025-07-30 RX ORDER — CADEXOMER IODINE 0.9 %
1 PADS, MEDICATED (EA) TOPICAL
Qty: 0 | Refills: 0 | DISCHARGE
Start: 2025-07-30

## 2025-07-30 RX ORDER — NALOXONE HYDROCHLORIDE 0.4 MG/ML
1 INJECTION, SOLUTION INTRAMUSCULAR; INTRAVENOUS; SUBCUTANEOUS
Qty: 1 | Refills: 0
Start: 2025-07-30 | End: 2025-07-30

## 2025-07-30 RX ORDER — ATORVASTATIN CALCIUM 80 MG/1
1 TABLET, FILM COATED ORAL
Qty: 0 | Refills: 0 | DISCHARGE
Start: 2025-07-30

## 2025-07-30 RX ORDER — ACETAMINOPHEN 500 MG/5ML
2 LIQUID (ML) ORAL
Refills: 0 | DISCHARGE

## 2025-07-30 RX ORDER — WHITE PETROLATUM 1 G/G
0 OINTMENT TOPICAL
Qty: 0 | Refills: 0 | DISCHARGE
Start: 2025-07-30

## 2025-07-30 RX ADMIN — APIXABAN 5 MILLIGRAM(S): 5 TABLET, FILM COATED ORAL at 05:45

## 2025-07-30 RX ADMIN — WHITE PETROLATUM 1 APPLICATION(S): 1 OINTMENT TOPICAL at 05:46

## 2025-07-30 RX ADMIN — Medication 1300 MILLIGRAM(S): at 17:47

## 2025-07-30 RX ADMIN — ATORVASTATIN CALCIUM 10 MILLIGRAM(S): 80 TABLET, FILM COATED ORAL at 21:15

## 2025-07-30 RX ADMIN — NYSTATIN 1 APPLICATION(S): 100000 CREAM TOPICAL at 05:45

## 2025-07-30 RX ADMIN — INSULIN LISPRO 1: 100 INJECTION, SOLUTION INTRAVENOUS; SUBCUTANEOUS at 08:57

## 2025-07-30 RX ADMIN — GABAPENTIN 100 MILLIGRAM(S): 400 CAPSULE ORAL at 15:02

## 2025-07-30 RX ADMIN — SACUBITRIL AND VALSARTAN 1 TABLET(S): 6; 6 PELLET ORAL at 05:45

## 2025-07-30 RX ADMIN — Medication 1 TABLET(S): at 11:41

## 2025-07-30 RX ADMIN — APIXABAN 5 MILLIGRAM(S): 5 TABLET, FILM COATED ORAL at 17:46

## 2025-07-30 RX ADMIN — WHITE PETROLATUM 1 APPLICATION(S): 1 OINTMENT TOPICAL at 17:47

## 2025-07-30 RX ADMIN — Medication 1300 MILLIGRAM(S): at 05:44

## 2025-07-30 RX ADMIN — NYSTATIN 1 APPLICATION(S): 100000 CREAM TOPICAL at 17:49

## 2025-07-30 RX ADMIN — OXYCODONE HYDROCHLORIDE 2.5 MILLIGRAM(S): 30 TABLET ORAL at 22:42

## 2025-07-30 RX ADMIN — METOPROLOL SUCCINATE 100 MILLIGRAM(S): 50 TABLET, EXTENDED RELEASE ORAL at 05:45

## 2025-07-30 RX ADMIN — FUROSEMIDE 40 MILLIGRAM(S): 10 INJECTION INTRAMUSCULAR; INTRAVENOUS at 05:45

## 2025-07-30 RX ADMIN — INSULIN GLARGINE-YFGN 10 UNIT(S): 100 INJECTION, SOLUTION SUBCUTANEOUS at 21:53

## 2025-07-30 RX ADMIN — AMLODIPINE BESYLATE 10 MILLIGRAM(S): 10 TABLET ORAL at 05:45

## 2025-07-30 RX ADMIN — OXYCODONE HYDROCHLORIDE 2.5 MILLIGRAM(S): 30 TABLET ORAL at 22:12

## 2025-07-30 RX ADMIN — Medication 20 MILLIGRAM(S): at 11:41

## 2025-07-30 RX ADMIN — GABAPENTIN 100 MILLIGRAM(S): 400 CAPSULE ORAL at 05:45

## 2025-07-30 RX ADMIN — GABAPENTIN 100 MILLIGRAM(S): 400 CAPSULE ORAL at 21:14

## 2025-07-30 RX ADMIN — SACUBITRIL AND VALSARTAN 1 TABLET(S): 6; 6 PELLET ORAL at 17:47

## 2025-07-30 RX ADMIN — INSULIN LISPRO 1: 100 INJECTION, SOLUTION INTRAVENOUS; SUBCUTANEOUS at 12:42

## 2025-07-30 RX ADMIN — Medication 1 APPLICATION(S): at 08:57

## 2025-07-30 RX ADMIN — CLOPIDOGREL BISULFATE 75 MILLIGRAM(S): 75 TABLET, FILM COATED ORAL at 11:41

## 2025-07-30 RX ADMIN — INSULIN LISPRO 1: 100 INJECTION, SOLUTION INTRAVENOUS; SUBCUTANEOUS at 17:45

## 2025-07-31 LAB
ANION GAP SERPL CALC-SCNC: 10 MMOL/L — SIGNIFICANT CHANGE UP (ref 5–17)
BUN SERPL-MCNC: 50 MG/DL — HIGH (ref 7–23)
CALCIUM SERPL-MCNC: 8.3 MG/DL — LOW (ref 8.4–10.5)
CHLORIDE SERPL-SCNC: 103 MMOL/L — SIGNIFICANT CHANGE UP (ref 96–108)
CO2 SERPL-SCNC: 29 MMOL/L — SIGNIFICANT CHANGE UP (ref 22–31)
CREAT SERPL-MCNC: 1.98 MG/DL — HIGH (ref 0.5–1.3)
EGFR: 26 ML/MIN/1.73M2 — LOW
EGFR: 26 ML/MIN/1.73M2 — LOW
GLUCOSE BLDC GLUCOMTR-MCNC: 167 MG/DL — HIGH (ref 70–99)
GLUCOSE BLDC GLUCOMTR-MCNC: 205 MG/DL — HIGH (ref 70–99)
GLUCOSE BLDC GLUCOMTR-MCNC: 253 MG/DL — HIGH (ref 70–99)
GLUCOSE BLDC GLUCOMTR-MCNC: 311 MG/DL — HIGH (ref 70–99)
GLUCOSE SERPL-MCNC: 132 MG/DL — HIGH (ref 70–99)
HCT VFR BLD CALC: 26.3 % — LOW (ref 34.5–45)
HGB BLD-MCNC: 7.8 G/DL — LOW (ref 11.5–15.5)
MCHC RBC-ENTMCNC: 28.2 PG — SIGNIFICANT CHANGE UP (ref 27–34)
MCHC RBC-ENTMCNC: 29.7 G/DL — LOW (ref 32–36)
MCV RBC AUTO: 94.9 FL — SIGNIFICANT CHANGE UP (ref 80–100)
NRBC # BLD AUTO: 0 K/UL — SIGNIFICANT CHANGE UP (ref 0–0)
NRBC # FLD: 0 K/UL — SIGNIFICANT CHANGE UP (ref 0–0)
NRBC BLD AUTO-RTO: 0 /100 WBCS — SIGNIFICANT CHANGE UP (ref 0–0)
PLATELET # BLD AUTO: 280 K/UL — SIGNIFICANT CHANGE UP (ref 150–400)
PMV BLD: 11.1 FL — SIGNIFICANT CHANGE UP (ref 7–13)
POTASSIUM SERPL-MCNC: 4.7 MMOL/L — SIGNIFICANT CHANGE UP (ref 3.5–5.3)
POTASSIUM SERPL-SCNC: 4.7 MMOL/L — SIGNIFICANT CHANGE UP (ref 3.5–5.3)
RBC # BLD: 2.77 M/UL — LOW (ref 3.8–5.2)
RBC # FLD: 18.3 % — HIGH (ref 10.3–14.5)
SODIUM SERPL-SCNC: 142 MMOL/L — SIGNIFICANT CHANGE UP (ref 135–145)
WBC # BLD: 6.77 K/UL — SIGNIFICANT CHANGE UP (ref 3.8–10.5)
WBC # FLD AUTO: 6.77 K/UL — SIGNIFICANT CHANGE UP (ref 3.8–10.5)

## 2025-07-31 RX ORDER — DAPAGLIFLOZIN 5 MG/1
1 TABLET, FILM COATED ORAL
Refills: 0 | DISCHARGE

## 2025-07-31 RX ORDER — OXYCODONE HYDROCHLORIDE 30 MG/1
2.5 TABLET ORAL
Refills: 0 | Status: DISCONTINUED | OUTPATIENT
Start: 2025-07-31 | End: 2025-08-01

## 2025-07-31 RX ORDER — FUROSEMIDE 10 MG/ML
40 INJECTION INTRAMUSCULAR; INTRAVENOUS ONCE
Refills: 0 | Status: COMPLETED | OUTPATIENT
Start: 2025-07-31 | End: 2025-07-31

## 2025-07-31 RX ADMIN — GABAPENTIN 100 MILLIGRAM(S): 400 CAPSULE ORAL at 21:39

## 2025-07-31 RX ADMIN — Medication 20 MILLIGRAM(S): at 11:37

## 2025-07-31 RX ADMIN — GABAPENTIN 100 MILLIGRAM(S): 400 CAPSULE ORAL at 13:04

## 2025-07-31 RX ADMIN — Medication 1300 MILLIGRAM(S): at 17:26

## 2025-07-31 RX ADMIN — METOPROLOL SUCCINATE 100 MILLIGRAM(S): 50 TABLET, EXTENDED RELEASE ORAL at 05:15

## 2025-07-31 RX ADMIN — NYSTATIN 1 APPLICATION(S): 100000 CREAM TOPICAL at 17:27

## 2025-07-31 RX ADMIN — INSULIN GLARGINE-YFGN 10 UNIT(S): 100 INJECTION, SOLUTION SUBCUTANEOUS at 21:39

## 2025-07-31 RX ADMIN — Medication 1300 MILLIGRAM(S): at 05:15

## 2025-07-31 RX ADMIN — SACUBITRIL AND VALSARTAN 1 TABLET(S): 6; 6 PELLET ORAL at 17:26

## 2025-07-31 RX ADMIN — INSULIN LISPRO 1: 100 INJECTION, SOLUTION INTRAVENOUS; SUBCUTANEOUS at 08:38

## 2025-07-31 RX ADMIN — FUROSEMIDE 40 MILLIGRAM(S): 10 INJECTION INTRAMUSCULAR; INTRAVENOUS at 05:15

## 2025-07-31 RX ADMIN — INSULIN LISPRO 3: 100 INJECTION, SOLUTION INTRAVENOUS; SUBCUTANEOUS at 12:43

## 2025-07-31 RX ADMIN — APIXABAN 5 MILLIGRAM(S): 5 TABLET, FILM COATED ORAL at 17:26

## 2025-07-31 RX ADMIN — APIXABAN 5 MILLIGRAM(S): 5 TABLET, FILM COATED ORAL at 05:15

## 2025-07-31 RX ADMIN — Medication 1 APPLICATION(S): at 08:38

## 2025-07-31 RX ADMIN — WHITE PETROLATUM 1 APPLICATION(S): 1 OINTMENT TOPICAL at 17:27

## 2025-07-31 RX ADMIN — CLOPIDOGREL BISULFATE 75 MILLIGRAM(S): 75 TABLET, FILM COATED ORAL at 11:37

## 2025-07-31 RX ADMIN — SACUBITRIL AND VALSARTAN 1 TABLET(S): 6; 6 PELLET ORAL at 05:14

## 2025-07-31 RX ADMIN — GABAPENTIN 100 MILLIGRAM(S): 400 CAPSULE ORAL at 05:15

## 2025-07-31 RX ADMIN — WHITE PETROLATUM 1 APPLICATION(S): 1 OINTMENT TOPICAL at 05:15

## 2025-07-31 RX ADMIN — NYSTATIN 1 APPLICATION(S): 100000 CREAM TOPICAL at 05:15

## 2025-07-31 RX ADMIN — ATORVASTATIN CALCIUM 10 MILLIGRAM(S): 80 TABLET, FILM COATED ORAL at 21:39

## 2025-07-31 RX ADMIN — Medication 1 TABLET(S): at 11:37

## 2025-07-31 RX ADMIN — AMLODIPINE BESYLATE 10 MILLIGRAM(S): 10 TABLET ORAL at 05:15

## 2025-07-31 RX ADMIN — FUROSEMIDE 40 MILLIGRAM(S): 10 INJECTION INTRAMUSCULAR; INTRAVENOUS at 13:04

## 2025-07-31 RX ADMIN — INSULIN LISPRO 2: 100 INJECTION, SOLUTION INTRAVENOUS; SUBCUTANEOUS at 21:40

## 2025-07-31 RX ADMIN — INSULIN LISPRO 2: 100 INJECTION, SOLUTION INTRAVENOUS; SUBCUTANEOUS at 17:41

## 2025-08-01 ENCOUNTER — TRANSCRIPTION ENCOUNTER (OUTPATIENT)
Age: 75
End: 2025-08-01

## 2025-08-01 VITALS
RESPIRATION RATE: 18 BRPM | SYSTOLIC BLOOD PRESSURE: 134 MMHG | OXYGEN SATURATION: 96 % | HEART RATE: 60 BPM | TEMPERATURE: 98 F | DIASTOLIC BLOOD PRESSURE: 97 MMHG

## 2025-08-01 LAB
ANION GAP SERPL CALC-SCNC: 15 MMOL/L — SIGNIFICANT CHANGE UP (ref 5–17)
BUN SERPL-MCNC: 48 MG/DL — HIGH (ref 7–23)
CALCIUM SERPL-MCNC: 8.8 MG/DL — SIGNIFICANT CHANGE UP (ref 8.4–10.5)
CHLORIDE SERPL-SCNC: 99 MMOL/L — SIGNIFICANT CHANGE UP (ref 96–108)
CO2 SERPL-SCNC: 27 MMOL/L — SIGNIFICANT CHANGE UP (ref 22–31)
CREAT SERPL-MCNC: 1.98 MG/DL — HIGH (ref 0.5–1.3)
EGFR: 26 ML/MIN/1.73M2 — LOW
EGFR: 26 ML/MIN/1.73M2 — LOW
GLUCOSE BLDC GLUCOMTR-MCNC: 159 MG/DL — HIGH (ref 70–99)
GLUCOSE BLDC GLUCOMTR-MCNC: 193 MG/DL — HIGH (ref 70–99)
GLUCOSE BLDC GLUCOMTR-MCNC: 210 MG/DL — HIGH (ref 70–99)
GLUCOSE SERPL-MCNC: 123 MG/DL — HIGH (ref 70–99)
HCT VFR BLD CALC: 29.6 % — LOW (ref 34.5–45)
HGB BLD-MCNC: 8.7 G/DL — LOW (ref 11.5–15.5)
MCHC RBC-ENTMCNC: 27.6 PG — SIGNIFICANT CHANGE UP (ref 27–34)
MCHC RBC-ENTMCNC: 29.4 G/DL — LOW (ref 32–36)
MCV RBC AUTO: 94 FL — SIGNIFICANT CHANGE UP (ref 80–100)
NRBC # BLD AUTO: 0 K/UL — SIGNIFICANT CHANGE UP (ref 0–0)
NRBC # FLD: 0 K/UL — SIGNIFICANT CHANGE UP (ref 0–0)
NRBC BLD AUTO-RTO: 0 /100 WBCS — SIGNIFICANT CHANGE UP (ref 0–0)
PLATELET # BLD AUTO: 307 K/UL — SIGNIFICANT CHANGE UP (ref 150–400)
PMV BLD: 11.2 FL — SIGNIFICANT CHANGE UP (ref 7–13)
POTASSIUM SERPL-MCNC: 4.7 MMOL/L — SIGNIFICANT CHANGE UP (ref 3.5–5.3)
POTASSIUM SERPL-SCNC: 4.7 MMOL/L — SIGNIFICANT CHANGE UP (ref 3.5–5.3)
RBC # BLD: 3.15 M/UL — LOW (ref 3.8–5.2)
RBC # FLD: 18.4 % — HIGH (ref 10.3–14.5)
SODIUM SERPL-SCNC: 141 MMOL/L — SIGNIFICANT CHANGE UP (ref 135–145)
WBC # BLD: 7.8 K/UL — SIGNIFICANT CHANGE UP (ref 3.8–10.5)
WBC # FLD AUTO: 7.8 K/UL — SIGNIFICANT CHANGE UP (ref 3.8–10.5)

## 2025-08-01 PROCEDURE — 93970 EXTREMITY STUDY: CPT

## 2025-08-01 PROCEDURE — 73610 X-RAY EXAM OF ANKLE: CPT

## 2025-08-01 PROCEDURE — 84484 ASSAY OF TROPONIN QUANT: CPT

## 2025-08-01 PROCEDURE — 84295 ASSAY OF SERUM SODIUM: CPT

## 2025-08-01 PROCEDURE — 73590 X-RAY EXAM OF LOWER LEG: CPT

## 2025-08-01 PROCEDURE — 85652 RBC SED RATE AUTOMATED: CPT

## 2025-08-01 PROCEDURE — 70450 CT HEAD/BRAIN W/O DYE: CPT

## 2025-08-01 PROCEDURE — 96372 THER/PROPH/DIAG INJ SC/IM: CPT

## 2025-08-01 PROCEDURE — 72125 CT NECK SPINE W/O DYE: CPT

## 2025-08-01 PROCEDURE — 73070 X-RAY EXAM OF ELBOW: CPT

## 2025-08-01 PROCEDURE — 80048 BASIC METABOLIC PNL TOTAL CA: CPT

## 2025-08-01 PROCEDURE — 96374 THER/PROPH/DIAG INJ IV PUSH: CPT

## 2025-08-01 PROCEDURE — 86880 COOMBS TEST DIRECT: CPT

## 2025-08-01 PROCEDURE — 80053 COMPREHEN METABOLIC PANEL: CPT

## 2025-08-01 PROCEDURE — 83880 ASSAY OF NATRIURETIC PEPTIDE: CPT

## 2025-08-01 PROCEDURE — 83735 ASSAY OF MAGNESIUM: CPT

## 2025-08-01 PROCEDURE — 73060 X-RAY EXAM OF HUMERUS: CPT

## 2025-08-01 PROCEDURE — 76377 3D RENDER W/INTRP POSTPROCES: CPT

## 2025-08-01 PROCEDURE — 71045 X-RAY EXAM CHEST 1 VIEW: CPT

## 2025-08-01 PROCEDURE — 82330 ASSAY OF CALCIUM: CPT

## 2025-08-01 PROCEDURE — 92610 EVALUATE SWALLOWING FUNCTION: CPT

## 2025-08-01 PROCEDURE — 86850 RBC ANTIBODY SCREEN: CPT

## 2025-08-01 PROCEDURE — 93005 ELECTROCARDIOGRAM TRACING: CPT

## 2025-08-01 PROCEDURE — 86901 BLOOD TYPING SEROLOGIC RH(D): CPT

## 2025-08-01 PROCEDURE — 83605 ASSAY OF LACTIC ACID: CPT

## 2025-08-01 PROCEDURE — 85025 COMPLETE CBC W/AUTO DIFF WBC: CPT

## 2025-08-01 PROCEDURE — 84100 ASSAY OF PHOSPHORUS: CPT

## 2025-08-01 PROCEDURE — 86922 COMPATIBILITY TEST ANTIGLOB: CPT

## 2025-08-01 PROCEDURE — 92526 ORAL FUNCTION THERAPY: CPT

## 2025-08-01 PROCEDURE — 86870 RBC ANTIBODY IDENTIFICATION: CPT

## 2025-08-01 PROCEDURE — 70486 CT MAXILLOFACIAL W/O DYE: CPT

## 2025-08-01 PROCEDURE — 36415 COLL VENOUS BLD VENIPUNCTURE: CPT

## 2025-08-01 PROCEDURE — C8929: CPT

## 2025-08-01 PROCEDURE — 86140 C-REACTIVE PROTEIN: CPT

## 2025-08-01 PROCEDURE — 85018 HEMOGLOBIN: CPT

## 2025-08-01 PROCEDURE — 82803 BLOOD GASES ANY COMBINATION: CPT

## 2025-08-01 PROCEDURE — 73030 X-RAY EXAM OF SHOULDER: CPT

## 2025-08-01 PROCEDURE — 82435 ASSAY OF BLOOD CHLORIDE: CPT

## 2025-08-01 PROCEDURE — 85027 COMPLETE CBC AUTOMATED: CPT

## 2025-08-01 PROCEDURE — 97162 PT EVAL MOD COMPLEX 30 MIN: CPT

## 2025-08-01 PROCEDURE — 85730 THROMBOPLASTIN TIME PARTIAL: CPT

## 2025-08-01 PROCEDURE — 96376 TX/PRO/DX INJ SAME DRUG ADON: CPT

## 2025-08-01 PROCEDURE — 93923 UPR/LXTR ART STDY 3+ LVLS: CPT

## 2025-08-01 PROCEDURE — 99285 EMERGENCY DEPT VISIT HI MDM: CPT

## 2025-08-01 PROCEDURE — 96375 TX/PRO/DX INJ NEW DRUG ADDON: CPT

## 2025-08-01 PROCEDURE — 86905 BLOOD TYPING RBC ANTIGENS: CPT

## 2025-08-01 PROCEDURE — 84132 ASSAY OF SERUM POTASSIUM: CPT

## 2025-08-01 PROCEDURE — 87040 BLOOD CULTURE FOR BACTERIA: CPT

## 2025-08-01 PROCEDURE — 82947 ASSAY GLUCOSE BLOOD QUANT: CPT

## 2025-08-01 PROCEDURE — 82962 GLUCOSE BLOOD TEST: CPT

## 2025-08-01 PROCEDURE — 85014 HEMATOCRIT: CPT

## 2025-08-01 PROCEDURE — 86900 BLOOD TYPING SEROLOGIC ABO: CPT

## 2025-08-01 PROCEDURE — 73630 X-RAY EXAM OF FOOT: CPT

## 2025-08-01 PROCEDURE — 76700 US EXAM ABDOM COMPLETE: CPT

## 2025-08-01 PROCEDURE — 85610 PROTHROMBIN TIME: CPT

## 2025-08-01 RX ORDER — TORSEMIDE 10 MG
1 TABLET ORAL
Qty: 30 | Refills: 0
Start: 2025-08-01 | End: 2025-08-30

## 2025-08-01 RX ADMIN — AMLODIPINE BESYLATE 10 MILLIGRAM(S): 10 TABLET ORAL at 09:38

## 2025-08-01 RX ADMIN — WHITE PETROLATUM 1 APPLICATION(S): 1 OINTMENT TOPICAL at 18:34

## 2025-08-01 RX ADMIN — INSULIN LISPRO 1: 100 INJECTION, SOLUTION INTRAVENOUS; SUBCUTANEOUS at 13:43

## 2025-08-01 RX ADMIN — Medication 1300 MILLIGRAM(S): at 18:35

## 2025-08-01 RX ADMIN — METOPROLOL SUCCINATE 100 MILLIGRAM(S): 50 TABLET, EXTENDED RELEASE ORAL at 09:39

## 2025-08-01 RX ADMIN — Medication 1300 MILLIGRAM(S): at 09:38

## 2025-08-01 RX ADMIN — APIXABAN 5 MILLIGRAM(S): 5 TABLET, FILM COATED ORAL at 18:34

## 2025-08-01 RX ADMIN — Medication 1 APPLICATION(S): at 09:40

## 2025-08-01 RX ADMIN — NYSTATIN 1 APPLICATION(S): 100000 CREAM TOPICAL at 18:34

## 2025-08-01 RX ADMIN — Medication 20 MILLIGRAM(S): at 13:45

## 2025-08-01 RX ADMIN — SACUBITRIL AND VALSARTAN 1 TABLET(S): 6; 6 PELLET ORAL at 09:37

## 2025-08-01 RX ADMIN — CLOPIDOGREL BISULFATE 75 MILLIGRAM(S): 75 TABLET, FILM COATED ORAL at 13:45

## 2025-08-01 RX ADMIN — NYSTATIN 1 APPLICATION(S): 100000 CREAM TOPICAL at 09:39

## 2025-08-01 RX ADMIN — FUROSEMIDE 40 MILLIGRAM(S): 10 INJECTION INTRAMUSCULAR; INTRAVENOUS at 09:39

## 2025-08-01 RX ADMIN — Medication 1 TABLET(S): at 13:45

## 2025-08-01 RX ADMIN — GABAPENTIN 100 MILLIGRAM(S): 400 CAPSULE ORAL at 13:47

## 2025-08-01 RX ADMIN — APIXABAN 5 MILLIGRAM(S): 5 TABLET, FILM COATED ORAL at 09:38

## 2025-08-01 RX ADMIN — SACUBITRIL AND VALSARTAN 1 TABLET(S): 6; 6 PELLET ORAL at 18:34

## 2025-08-01 RX ADMIN — GABAPENTIN 100 MILLIGRAM(S): 400 CAPSULE ORAL at 09:41

## 2025-08-01 RX ADMIN — WHITE PETROLATUM 1 APPLICATION(S): 1 OINTMENT TOPICAL at 09:39

## 2025-08-01 RX ADMIN — OXYCODONE HYDROCHLORIDE 2.5 MILLIGRAM(S): 30 TABLET ORAL at 04:10

## 2025-08-01 RX ADMIN — INSULIN LISPRO 1: 100 INJECTION, SOLUTION INTRAVENOUS; SUBCUTANEOUS at 09:37

## 2025-08-28 ENCOUNTER — TRANSCRIPTION ENCOUNTER (OUTPATIENT)
Age: 75
End: 2025-08-28